# Patient Record
Sex: FEMALE | Race: WHITE | NOT HISPANIC OR LATINO | Employment: UNEMPLOYED | URBAN - NONMETROPOLITAN AREA
[De-identification: names, ages, dates, MRNs, and addresses within clinical notes are randomized per-mention and may not be internally consistent; named-entity substitution may affect disease eponyms.]

---

## 2017-01-01 PROBLEM — J34.1 MUCOUS RETENTION CYST OF MAXILLARY SINUS: Status: ACTIVE | Noted: 2017-01-01

## 2017-01-01 PROBLEM — G62.9 NEUROPATHY: Status: ACTIVE | Noted: 2017-01-01

## 2017-01-06 ENCOUNTER — OFFICE VISIT (OUTPATIENT)
Dept: OBSTETRICS AND GYNECOLOGY | Facility: CLINIC | Age: 27
End: 2017-01-06

## 2017-01-06 VITALS
HEART RATE: 78 BPM | DIASTOLIC BLOOD PRESSURE: 72 MMHG | HEIGHT: 67 IN | WEIGHT: 231.8 LBS | SYSTOLIC BLOOD PRESSURE: 114 MMHG | RESPIRATION RATE: 18 BRPM | BODY MASS INDEX: 36.38 KG/M2

## 2017-01-06 DIAGNOSIS — L68.0 FEMALE HIRSUTISM: Primary | ICD-10-CM

## 2017-01-06 DIAGNOSIS — N64.4 MASTALGIA: ICD-10-CM

## 2017-01-06 PROCEDURE — 99214 OFFICE O/P EST MOD 30 MIN: CPT | Performed by: NURSE PRACTITIONER

## 2017-01-06 RX ORDER — ERGOCALCIFEROL 1.25 MG/1
CAPSULE ORAL
Refills: 3 | COMMUNITY
Start: 2016-12-20 | End: 2018-04-25

## 2017-01-06 RX ORDER — DESOGESTREL AND ETHINYL ESTRADIOL 0.15-0.03
1 KIT ORAL DAILY
Qty: 28 TABLET | Refills: 12 | Status: SHIPPED | OUTPATIENT
Start: 2017-01-06 | End: 2017-06-30 | Stop reason: ALTCHOICE

## 2017-01-06 RX ORDER — SPIRONOLACTONE 100 MG/1
100 TABLET, FILM COATED ORAL DAILY
Qty: 30 TABLET | Refills: 5 | Status: SHIPPED | OUTPATIENT
Start: 2017-01-06 | End: 2017-08-24 | Stop reason: SDUPTHER

## 2017-01-06 RX ORDER — AMITRIPTYLINE HYDROCHLORIDE 10 MG/1
TABLET, FILM COATED ORAL
Refills: 1 | COMMUNITY
Start: 2016-12-20 | End: 2018-04-25

## 2017-01-06 RX ORDER — ETODOLAC 400 MG/1
TABLET, FILM COATED ORAL
Refills: 1 | COMMUNITY
Start: 2016-12-16 | End: 2018-04-25

## 2017-01-06 RX ORDER — TIZANIDINE 4 MG/1
TABLET ORAL
Refills: 1 | COMMUNITY
Start: 2016-12-16 | End: 2018-04-25

## 2017-01-06 RX ORDER — MULTIVIT WITH MINERALS/LUTEIN
1000 TABLET ORAL DAILY
Qty: 30 CAPSULE | Refills: 11 | Status: SHIPPED | OUTPATIENT
Start: 2017-01-06 | End: 2018-04-25

## 2017-01-06 NOTE — MR AVS SNAPSHOT
Costa Watson   1/6/2017 2:00 PM   Office Visit    Dept Phone:  [phone]   Encounter #:  [id number]    Provider:  RAHUL Boothe   Department:  [facility]                Your Full Care Plan              Today's Medication Changes          These changes are accurate as of: 1/6/17  3:09 PM.  If you have any questions, ask your nurse or doctor.               New Medication(s)Ordered:     desogestrel-ethinyl estradiol 0.15-30 MG-MCG per tablet   Commonly known as:  APRI   Take 1 tablet by mouth Daily.   Started by:  RAHUL Boothe       vitamin E 1000 UNIT capsule   Take 1 capsule by mouth Daily.   Started by:  RAHUL Boothe         Medication(s)that have changed:     spironolactone 100 MG tablet   Commonly known as:  ALDACTONE   Take 1 tablet by mouth Daily.   What changed:    - medication strength  - how much to take   Changed by:  RAHUL Boothe         Stop taking medication(s)listed here:     norgestimate-ethinyl estradiol 0.25-35 MG-MCG per tablet   Commonly known as:  SPRINTEC 28   Stopped by:  RAHUL Boothe                Where to Get Your Medications      These medications were sent to [facility] - [facility]  - [phone]  - [phone] [address] [place] [address]     Phone:  [phone]     desogestrel-ethinyl estradiol 0.15-30 MG-MCG per tablet    spironolactone 100 MG tablet    vitamin E 1000 UNIT capsule                  Your Updated Medication List          This list is accurate as of: 1/6/17  3:09 PM.  Always use your most recent med list.                amitriptyline 10 MG tablet   Commonly known as:  ELAVIL       citalopram 40 MG tablet   Commonly known as:  CELEXA   Take 1 tablet by mouth daily.       desogestrel-ethinyl estradiol 0.15-30 MG-MCG per tablet   Commonly known as:  APRI      Take 1 tablet by mouth Daily.       etodolac 400 MG tablet   Commonly known as:  LODINE       simvastatin 20 MG tablet   Commonly known as:  ZOCOR   Take 1 tablet by mouth every night.       spironolactone 100 MG tablet   Commonly known as:  ALDACTONE   Take 1 tablet by mouth Daily.       tiZANidine 4 MG tablet   Commonly known as:  ZANAFLEX       vitamin D 43517 UNITS capsule capsule   Commonly known as:  ERGOCALCIFEROL       vitamin E 1000 UNIT capsule   Take 1 capsule by mouth Daily.               Instructions     None    Patient Instructions History      Upcoming Appointments     Visit Type Date Time Department    OFFICE VISIT 1/6/2017  2:00 PM [facility]    FOLLOW UP 4/19/2017  2:30 PM [facility]      [facility] Sign[facility]     Our records indicate that you have an active [facility] [employer] account.    You can view your After Visit Summary by going to [employer] and logging in with your [employer] username and password.  If you don't have a [employer] username and password but a parent or guardian has access to your record, the parent or guardian should login with their own [employer] username and password and access your record to view the After Visit Summary.    If you have questions, you can email [email or web address] or call [phone] to talk to our [employer] staff.  Remember, [employer] is NOT to be used for urgent needs.  For medical emergencies, dial 911.               Other Info from Your Visit           Your Appointments     Apr 19, 2017  2:30 PM CDT   Follow Up with RAHUL Boothe   [facility] (--)    [address] [place] [address] Dr Ambar CHICAS [id number] [phone]           Arrive 15 minutes prior to appointment.              Allergies     No Known Allergies      Reason for Visit     Breast Problem painful lumps in both breast    excessive hair growth           Vital Signs     Blood Pressure Pulse Respirations Height Weight  "Last Menstrual Period    114/72 (BP Location: Right arm, Patient Position: Sitting, Cuff Size: Adult) 78 18 67\" (170.2 cm) 231 lb 12.8 oz (105 kg) 12/20/2016 (Approximate)    Breastfeeding? Body Mass Index Smoking Status             No 36.31 kg/m2 Current Every Day Smoker           "

## 2017-01-06 NOTE — PROGRESS NOTES
Davis Watson is a 26 y.o. female.     History of Present Illness   Pt presents with complaints of continued excessive hair growth. Pt has PCOS and hirsutism. I started pt on OCP and spironolactone 50mg 2 months ago. Pt states since then, she has noticed increasing hair around the right nipple. We discussed that this process is not 100% resolution and can take 6 months to 1 yr.     Pt also complains of bilateral generalized breast tenderness and sharp pains since starting OCP. States her breasts are very dense. Her partner states she has felt a lump in the outer quadrant of the right breast a few days ago. Pt has hx of caffeine abuse but is down from a 24pk of mountain dew to 1 bottle daily. Pt also has fibromyalgia. Paternal aunt with breast cancer.     The following portions of the patient's history were reviewed and updated as appropriate: allergies, current medications, past family history, past medical history, past social history, past surgical history and problem list.    Review of Systems   Constitutional: Negative.    Respiratory: Negative.    Cardiovascular: Negative.    Genitourinary: Positive for pelvic pain (increased cramps since OCP use). Negative for menstrual problem.   Skin:        Hirsutism, see hpi   Hematological: Negative for adenopathy.       Objective   Physical Exam   Constitutional: She is oriented to person, place, and time. She appears well-developed and well-nourished.   Cardiovascular: Normal rate, regular rhythm and normal heart sounds.    Pulmonary/Chest: Effort normal and breath sounds normal. Right breast exhibits tenderness. Right breast exhibits no inverted nipple, no mass, no nipple discharge and no skin change. Left breast exhibits tenderness. Left breast exhibits no inverted nipple, no mass, no nipple discharge and no skin change. Breast asymmetry: left breast mildly smaller than right.   Generalized very dense, mild fibrocystic changes bilaterally. Area of concern  in right outer quadrant is only noted as deep dense tissue, no palpable mass. Patch of course dark hair above right areola, fewer hairs on left areola.    Neurological: She is alert and oriented to person, place, and time.   Psychiatric: She has a normal mood and affect. Her behavior is normal.   Vitals reviewed.      Assessment/Plan   Costa was seen today for breast problem and excessive hair growth.    Diagnoses and all orders for this visit:    Female hirsutism    Mastalgia    Other orders  -     desogestrel-ethinyl estradiol (APRI) 0.15-30 MG-MCG per tablet; Take 1 tablet by mouth Daily.  -     spironolactone (ALDACTONE) 100 MG tablet; Take 1 tablet by mouth Daily.  -     vitamin E 1000 UNIT capsule; Take 1 capsule by mouth Daily.     Change progesterone and decrease estrogen content to. Increase Spironolactone to 100mg daily. Encouraged cutting out all caffeine, stop smoking, wear good fitting bra (despite pt stating it hurts more in a bra), Vit E and warm compresses with NSAID use sparingly. If this continues and is bothersome enough, stop OCP. If masses present and linger, RTC for ultrasound. RTC 6 months for hirsutism follow up.

## 2017-02-06 RX ORDER — SIMVASTATIN 20 MG
TABLET ORAL
Qty: 30 TABLET | Refills: 3 | Status: SHIPPED | OUTPATIENT
Start: 2017-02-06 | End: 2018-04-25

## 2017-03-31 PROBLEM — F41.1 GENERALIZED ANXIETY DISORDER: Status: ACTIVE | Noted: 2017-03-31

## 2017-03-31 PROBLEM — F32.A DEPRESSION: Status: ACTIVE | Noted: 2017-03-31

## 2017-05-24 ENCOUNTER — OFFICE VISIT (OUTPATIENT)
Dept: OBSTETRICS AND GYNECOLOGY | Facility: CLINIC | Age: 27
End: 2017-05-24

## 2017-05-24 VITALS
BODY MASS INDEX: 36.32 KG/M2 | HEIGHT: 67 IN | SYSTOLIC BLOOD PRESSURE: 110 MMHG | WEIGHT: 231.4 LBS | HEART RATE: 88 BPM | DIASTOLIC BLOOD PRESSURE: 78 MMHG | RESPIRATION RATE: 18 BRPM

## 2017-05-24 DIAGNOSIS — N76.0 BV (BACTERIAL VAGINOSIS): Primary | ICD-10-CM

## 2017-05-24 DIAGNOSIS — N92.1 BREAKTHROUGH BLEEDING ON BIRTH CONTROL PILLS: ICD-10-CM

## 2017-05-24 DIAGNOSIS — B96.89 BV (BACTERIAL VAGINOSIS): Primary | ICD-10-CM

## 2017-05-24 DIAGNOSIS — B37.31 CANDIDA VAGINITIS: ICD-10-CM

## 2017-05-24 PROCEDURE — 87210 SMEAR WET MOUNT SALINE/INK: CPT | Performed by: NURSE PRACTITIONER

## 2017-05-24 PROCEDURE — 99213 OFFICE O/P EST LOW 20 MIN: CPT | Performed by: NURSE PRACTITIONER

## 2017-05-24 RX ORDER — METRONIDAZOLE 500 MG/1
500 TABLET ORAL 2 TIMES DAILY
Qty: 14 TABLET | Refills: 0 | Status: SHIPPED | OUTPATIENT
Start: 2017-05-24 | End: 2017-05-31

## 2017-05-24 RX ORDER — FLUCONAZOLE 150 MG/1
150 TABLET ORAL ONCE
Qty: 3 TABLET | Refills: 0 | Status: SHIPPED | OUTPATIENT
Start: 2017-05-24 | End: 2017-05-24

## 2017-06-30 ENCOUNTER — OFFICE VISIT (OUTPATIENT)
Dept: OBSTETRICS AND GYNECOLOGY | Facility: CLINIC | Age: 27
End: 2017-06-30

## 2017-06-30 VITALS
DIASTOLIC BLOOD PRESSURE: 76 MMHG | HEART RATE: 80 BPM | BODY MASS INDEX: 36.32 KG/M2 | RESPIRATION RATE: 16 BRPM | SYSTOLIC BLOOD PRESSURE: 112 MMHG | WEIGHT: 231.4 LBS | HEIGHT: 67 IN

## 2017-06-30 DIAGNOSIS — Z30.017 NEXPLANON INSERTION: Primary | ICD-10-CM

## 2017-06-30 PROCEDURE — 11981 INSERTION DRUG DLVR IMPLANT: CPT | Performed by: NURSE PRACTITIONER

## 2017-06-30 RX ORDER — CELECOXIB 200 MG/1
CAPSULE ORAL
Refills: 1 | COMMUNITY
Start: 2017-06-13 | End: 2018-04-25

## 2017-06-30 NOTE — PROGRESS NOTES
Nexplanon Insertion    Patient's last menstrual period was 06/29/2017 (exact date).    Date of procedure:  6/30/2017    Risks and benefits discussed? yes  All questions answered? yes  Consents given by the patient  Written consent obtained? yes    Local anesthesia used:  yes - 2 cc's of  Meds; anesthesia local: 2% lidocaine    Procedure documentation:    The upper left arm (non-dominant) was marked at the intended site of insertion.  Betadine was used to cleanse the skin.  Local anesthesia was injected.  The Nexplanon was placed subdermally without difficulty.  The device was able to be palpated in the arm by both myself and Costa.  Steri-strips were then placed across the site of insertion and the arm was wrapped.    She tolerated the procedure well.  There were no complications.  EBL was minimal.    Post procedure instructions: Remove the wrapping in 24 hours and the steri-strips in 5 days.    Follow up needed: PRN    Diagnosis:  Nexplanon Insertion    This note was electronically signed.    Melissa Sousa, APRN  6/30/2017

## 2017-08-24 RX ORDER — SPIRONOLACTONE 100 MG/1
100 TABLET, FILM COATED ORAL DAILY
Qty: 30 TABLET | Refills: 5 | Status: SHIPPED | OUTPATIENT
Start: 2017-08-24 | End: 2018-04-25

## 2017-08-24 RX ORDER — SPIRONOLACTONE 100 MG/1
TABLET, FILM COATED ORAL
Qty: 30 TABLET | Refills: 3 | OUTPATIENT
Start: 2017-08-24

## 2017-10-23 ENCOUNTER — OFFICE VISIT (OUTPATIENT)
Dept: OBSTETRICS AND GYNECOLOGY | Facility: CLINIC | Age: 27
End: 2017-10-23

## 2017-10-23 VITALS
HEIGHT: 67 IN | WEIGHT: 226.6 LBS | SYSTOLIC BLOOD PRESSURE: 114 MMHG | BODY MASS INDEX: 35.56 KG/M2 | DIASTOLIC BLOOD PRESSURE: 86 MMHG

## 2017-10-23 DIAGNOSIS — Z87.42 HISTORY OF PCOS: ICD-10-CM

## 2017-10-23 DIAGNOSIS — N92.6 IRREGULAR MENSES: ICD-10-CM

## 2017-10-23 DIAGNOSIS — Z97.5 NEXPLANON IN PLACE: Primary | ICD-10-CM

## 2017-10-23 PROCEDURE — 99406 BEHAV CHNG SMOKING 3-10 MIN: CPT | Performed by: NURSE PRACTITIONER

## 2017-10-23 PROCEDURE — 99203 OFFICE O/P NEW LOW 30 MIN: CPT | Performed by: NURSE PRACTITIONER

## 2017-10-23 RX ORDER — HYDROCORTISONE ACETATE 25 MG/1
SUPPOSITORY RECTAL
Refills: 1 | COMMUNITY
Start: 2017-09-22 | End: 2018-04-25

## 2017-10-23 RX ORDER — NITROFURANTOIN 25; 75 MG/1; MG/1
CAPSULE ORAL
Refills: 0 | COMMUNITY
Start: 2017-09-21 | End: 2018-04-25

## 2017-10-23 RX ORDER — NICOTINE 21 MG/24HR
PATCH, TRANSDERMAL 24 HOURS TRANSDERMAL
Refills: 0 | COMMUNITY
Start: 2017-09-27 | End: 2018-04-25

## 2017-10-23 RX ORDER — ONDANSETRON 4 MG/1
TABLET, ORALLY DISINTEGRATING ORAL
Refills: 1 | COMMUNITY
Start: 2017-09-22 | End: 2018-04-25

## 2017-10-23 RX ORDER — LOPERAMIDE HYDROCHLORIDE 2 MG/1
CAPSULE ORAL
Refills: 1 | COMMUNITY
Start: 2017-09-21 | End: 2018-04-25

## 2017-10-23 RX ORDER — MULTIVIT-MIN/FOLIC/VIT K/LYCOP 400-20-370
TABLET ORAL
Refills: 0 | COMMUNITY
Start: 2017-09-27 | End: 2018-04-25

## 2017-10-23 RX ORDER — METRONIDAZOLE 500 MG/1
TABLET ORAL
Refills: 0 | COMMUNITY
Start: 2017-09-22 | End: 2018-04-25

## 2017-10-23 NOTE — PROGRESS NOTES
"Subjective     Chief Complaint   Patient presents with   • Menstrual Problem       Costa Watson is a 27 y.o.  whose LMP is Patient's last menstrual period was 10/13/2017 (approximate).. She is new to the practice. She presents with complaints of abnormal bleeding with the Nexplanon. She got the Nexplanon placed on 17. LNMP 10/1/17 which last for approx 2 weeks. Reports she bled almost the entire month of September.  Reports her menses have been very irregular with the Nexplanon. She got the Nexplanon for menses control and she states her cycles are very irregular since getting the Nexplanon.  She is concerned because the Nexplanon is not regulating her cycles. State she was told by her previous OB/GYN that she has PCOS. She is questioning the diagnosis because she never had a pelvic US, she just completed labs. Recently had labs collected in early 10/17.  She was prescribed spironolactone to help with hair growth but is not taking. Her last pap was approx 2 year ago. She also complains of very painful menses. Has never had a pelvic US. She recently moved her from [facility] to take care of her mother.      HPI    HPI    The following portions of the patient's history were reviewed and updated as appropriate:vital signs, allergies, current medications, past medical history, past social history, past surgical history and problem list      Review of Systems     Review of Systems   Constitutional: Negative.    Respiratory: Negative.    Cardiovascular: Negative.    Gastrointestinal: Negative.    Endocrine: Negative.    Genitourinary: Positive for menstrual problem (irregular menses ) and pelvic pain (with cycles ).   Musculoskeletal: Negative.    Skin: Negative.    Neurological: Negative.    Psychiatric/Behavioral: Negative.        Objective      /86  Ht 67\" (170.2 cm)  Wt 226 lb 9.6 oz (103 kg)  LMP 10/13/2017 (Approximate)  Breastfeeding? No  BMI 35.49 kg/m2    Physical Exam    Physical " Exam   Constitutional: She is oriented to person, place, and time. She appears well-nourished.   Pulmonary/Chest: Effort normal.   Abdominal: Soft.   Musculoskeletal: Normal range of motion.   Neurological: She is alert and oriented to person, place, and time.   Skin: Skin is warm and dry.   Psychiatric: She has a normal mood and affect. Her behavior is normal.   Vitals reviewed.      Lab Review   Labs: PCOS panel form previous OB. Elevated Testosterone, otherwise normal labs. Insulin is near the high end of normal. No urine sample today d/t pt is unable to void.      Imaging   No data reviewed    Assessment  There are no diagnoses linked to this encounter.    Additional Assessment:   1) Irregular menses with Nexplanon  2) PCOS     Plan       1. Irregular menses- Reassured that menses will be abnormal with Nexplanon. Pt desires to keep Nexplanon at this time.     2. PCOS- Labs rev'd. Plan to check TVUS.     3. Scheduled for: STD Labs - N/A , Ultrasound of the -  PELVIC , Mammography - N/A , Bone Density Test - NO , Additional Labs - None     4. Pap:  approx 2 years     5. Contraception: Nexplanon     6. STD:  Enc condom use.     7. Smoking status: smoker. Enc smoking cessation. Pt states she is under a lot of stress.     8. Annual Exam scheduled for: Needs to schedule     I advised the patient of the risks in continuing to use tobacco, and I provided this patient with smoking cessation educational materials.  I also discussed how to quit smoking and the patient has expressed the willingness to quit.      During this visit, I spent 3-10 mintues counseling the patient regarding smoking cessation.         RTO for AE and TVUS for eval of PCOS.     Payton Mueller, APRN  10/23/2017

## 2018-04-25 ENCOUNTER — PROCEDURE VISIT (OUTPATIENT)
Dept: OBSTETRICS AND GYNECOLOGY | Facility: CLINIC | Age: 28
End: 2018-04-25

## 2018-04-25 VITALS
SYSTOLIC BLOOD PRESSURE: 124 MMHG | BODY MASS INDEX: 37.98 KG/M2 | HEIGHT: 67 IN | DIASTOLIC BLOOD PRESSURE: 80 MMHG | WEIGHT: 242 LBS

## 2018-04-25 DIAGNOSIS — Z30.46 NEXPLANON REMOVAL: Primary | ICD-10-CM

## 2018-04-25 PROCEDURE — 11982 REMOVE DRUG IMPLANT DEVICE: CPT | Performed by: NURSE PRACTITIONER

## 2018-04-25 RX ORDER — DICLOFENAC SODIUM 75 MG/1
TABLET, DELAYED RELEASE ORAL
Refills: 0 | COMMUNITY
Start: 2018-04-06 | End: 2018-11-17

## 2018-04-25 NOTE — PROGRESS NOTES
Procedure: Nexplanon removal    Pre Dx: 1) Nexplanon removal, seeking pregnancy   Post Dx: 1) Nexplanon removal     The risks, benefits, and alternatives to remove the device have been discussed with the patient at length. All of her questions are answered. She is aware of the need for alternative means of contraception if desired. Verbal informed consent is obtained.    Able to easily palpate the device in the nondominant Left arm. Additional imaging was not required. The device feels freely mobile and easily accessible. She was placed in the examining table in a supine position with her arm elevated at her side. The skin over this site is prepped with Betadine. 2-3 mL of 1% lidocaine with epinephrine was injected.  Downward pressure was applied on the end of the implant nearest the axilla, and a 2-3 mm incision was made in a longitudinal direction of the arm at the tip of the implant closest to the elbow. The implant was then pushed gently toward the incision until the tip was visible. The fibrous capsule was opened with blunt dissection using a hemostat. The implant was grasp with a hemostat and was easily removed intact. It measured a  full 4 cm in length.    Tolerated well  No apparent complications    The skin was cleansed and dried. A Steri-Strip was applied. The arm was gently wrapped with Kerlex gauze. The patient had normal strength and sensation in her left extremity. There was no significant bleeding. There were no complications. The patient was advised about proper care of the dressings. She was advised to call or return for complications such as fever, signs of infection, increased pain, or any other concerns.    Warning signs, limitations and expectations reviewed. ERX sent for prenatal vitamins.   Pt is a vapor, enc smoking cessation.     Payton Mueller, RAHUL  4/25/2018  11:34 AM

## 2019-01-10 ENCOUNTER — OFFICE VISIT (OUTPATIENT)
Dept: OBSTETRICS AND GYNECOLOGY | Facility: CLINIC | Age: 29
End: 2019-01-10

## 2019-01-10 VITALS
HEIGHT: 67 IN | DIASTOLIC BLOOD PRESSURE: 80 MMHG | WEIGHT: 227 LBS | SYSTOLIC BLOOD PRESSURE: 110 MMHG | BODY MASS INDEX: 35.63 KG/M2

## 2019-01-10 DIAGNOSIS — N64.4 MASTODYNIA: ICD-10-CM

## 2019-01-10 DIAGNOSIS — F17.200 TOBACCO DEPENDENCE: ICD-10-CM

## 2019-01-10 DIAGNOSIS — Z87.42 HISTORY OF PCOS: ICD-10-CM

## 2019-01-10 DIAGNOSIS — Z20.2 POSSIBLE EXPOSURE TO STD: ICD-10-CM

## 2019-01-10 DIAGNOSIS — F32.89 OTHER DEPRESSION: ICD-10-CM

## 2019-01-10 DIAGNOSIS — Z01.419 WELL WOMAN EXAM WITH ROUTINE GYNECOLOGICAL EXAM: Primary | ICD-10-CM

## 2019-01-10 PROCEDURE — 99406 BEHAV CHNG SMOKING 3-10 MIN: CPT | Performed by: OBSTETRICS & GYNECOLOGY

## 2019-01-10 PROCEDURE — 99385 PREV VISIT NEW AGE 18-39: CPT | Performed by: OBSTETRICS & GYNECOLOGY

## 2019-01-10 PROCEDURE — 99213 OFFICE O/P EST LOW 20 MIN: CPT | Performed by: OBSTETRICS & GYNECOLOGY

## 2019-01-10 NOTE — PROGRESS NOTES
GYN Annual Exam     CC- Here for annual exam.     Costa Watson is a 28 y.o. female established pt of practice who is new to me who presents for annual well woman exam. Periods are irregular, lasting 4 days. She tends to skip months. She was diagnosed with PCOS at age 26 but has had so many health problems that she has not kept up with her PCOS. She has a H/o histoplasmosis and says she had an MI in September related to this diagnosis. She has not had any cardiology followup recently and says she plans on moving soon so does not want a referral.  She also has a h/o sepsis related to C diff. She had a Nexplanon that was removed this spring.     OB History      Para Term  AB Living    0 0 0 0 0 0    SAB TAB Ectopic Molar Multiple Live Births    0 0 0   0          Obstetric Comments    No plans          Menarche: 13  Current contraception: none  History of abnormal Pap smear: no  History of abnormal mammogram: no  Family history of uterine, colon or ovarian cancer: no  Family history of breast cancer: yes - p aunt ? age at dx  H/o STDs: none  Gardasil: 2/3  H/O PCOS    Health Maintenance   Topic Date Due   • ANNUAL PHYSICAL  1995   • HPV VACCINES (1 - Female 3-dose series) 2003   • HEPATITIS A VACCINE ADULT (1 of 2) 2010   • PNEUMOCOCCAL VACCINE (19-64 MEDIUM RISK) (1 of 1 - PPSV23) 2011   • TDAP/TD VACCINES (1 - Tdap) 2011   • PAP SMEAR  2016   • INFLUENZA VACCINE  2018       Past Medical History:   Diagnosis Date   • Acid reflux    • Anxiety    • C. difficile colitis    • Colitis    • Cyst, sinus nasal    • Depression    • Fibromyalgia    • Histoplasmosis    • Myocardial infarction (CMS/HCC) 2018   • PCOS (polycystic ovarian syndrome)    • Vaginitis        Past Surgical History:   Procedure Laterality Date   • CARDIAC CATHETERIZATION     • MEDIASTINOSCOPY           Current Outpatient Medications:   •  itraconazole (SPORANOX) 100 MG capsule, Take 200 mg by  "mouth 2 (Two) Times a Day., Disp: , Rfl: 2  •  sertraline (ZOLOFT) 100 MG tablet, Take 100 mg by mouth Daily., Disp: , Rfl:   •  vitamin E (vitamin E) 400 UNIT capsule, Take 1 capsule by mouth Daily., Disp: 30 capsule, Rfl: 3    Allergies   Allergen Reactions   • Latex Hives       Social History     Tobacco Use   • Smoking status: Current Every Day Smoker     Packs/day: 0.50     Years: 9.00     Pack years: 4.50     Types: Cigarettes   • Smokeless tobacco: Never Used   Substance Use Topics   • Alcohol use: No   • Drug use: No       Family History   Problem Relation Age of Onset   • Osteoporosis Maternal Grandmother    • Diabetes Father    • Arthritis Father    • Alzheimer's disease Mother    • Breast cancer Paternal Aunt    • Ovarian cancer Neg Hx    • Colon cancer Neg Hx        Review of Systems   Constitutional: Negative for appetite change, fatigue, fever and unexpected weight change.   Eyes: Negative for photophobia and visual disturbance.   Respiratory: Negative for cough, chest tightness and shortness of breath.    Cardiovascular: Negative for chest pain and palpitations.   Gastrointestinal: Negative for abdominal distention, abdominal pain, constipation, diarrhea and nausea.   Endocrine: Negative for cold intolerance and heat intolerance.   Genitourinary: Negative for dyspareunia, dysuria, pelvic pain and vaginal discharge.   Musculoskeletal: Positive for myalgias (Breast tenderness).   Skin: Negative for color change and rash.        + abnormal hair growth   Allergic/Immunologic: Negative for environmental allergies and food allergies.   Neurological: Negative for headaches.   Hematological: Negative for adenopathy. Does not bruise/bleed easily.   Psychiatric/Behavioral: Negative for dysphoric mood. The patient is not nervous/anxious.    All other systems reviewed and are negative.      /80   Ht 170.2 cm (67\")   Wt 103 kg (227 lb)   LMP 12/20/2018 (Exact Date)   Breastfeeding? No   BMI 35.55 kg/m² "     Physical Exam   Constitutional: She is oriented to person, place, and time. She appears well-developed and well-nourished.   HENT:   Head: Normocephalic and atraumatic.   Eyes: Conjunctivae are normal. No scleral icterus.   Neck: Neck supple. No thyromegaly present.   Cardiovascular: Normal rate, regular rhythm and normal heart sounds.   Pulmonary/Chest: Effort normal and breath sounds normal. Right breast exhibits tenderness. Right breast exhibits no inverted nipple, no mass, no nipple discharge and no skin change. Left breast exhibits tenderness. Left breast exhibits no inverted nipple, no mass, no nipple discharge and no skin change.   Fibrocystic breast changes, no dominant masses   Abdominal: Soft. Bowel sounds are normal. She exhibits no distension and no mass. There is no tenderness. There is no rebound and no guarding. No hernia.   Genitourinary: Uterus normal. Pelvic exam was performed with patient supine. There is no rash, tenderness or lesion on the right labia. There is no rash, tenderness or lesion on the left labia. Cervix exhibits no motion tenderness, no discharge and no friability. Right adnexum displays no mass, no tenderness and no fullness. Left adnexum displays no mass, no tenderness and no fullness. No erythema, tenderness or bleeding in the vagina. No foreign body in the vagina. No signs of injury around the vagina. No vaginal discharge found.       Musculoskeletal: Normal range of motion.   Neurological: She is alert and oriented to person, place, and time.   Skin: Skin is warm and dry.   Psychiatric: She has a normal mood and affect. Her behavior is normal. Judgment and thought content normal.   Nursing note and vitals reviewed.         Assessment/Plan    1) GYN HM: check pap/C/G/T   SBE demonstrated and encouraged.  2) STD screening: accepts2 Condoms encouraged.  3) Contraception: none at present. Pt needs to avoid E2 containing methods due to her h/o MI. Enc her to consider an IUD.  Discussed with patient at length risk, benefits and alternatives to all contraceptive options, including oral contraceptive pills (both combination and progesterone only), vaginal rings, patches, Dep Provera, condoms, diaphragm, cervical caps, as well as long active but reversible forms such as Nexplanon and all IUD’s.  Differences in birth control and cycle control between methods were outlined along with correct usage for each method.  After discussion, the patient is not ready to make a decision yet.   4) Family Planning: no plans at present, encourage folic acid daily  5) Diet and Exercise discussed  6) Smoking Status: I advised Costa of the risks of continuing to use tobacco, and I provided her with tobacco cessation educational materials . During this visit, I spent 4 minutes counseling the patient regarding tobacco cessation.  7) Depression- refill Zoloft 100 mg QD  8) H/O PCOS- schedule TVUS, fasting insulin/glucose,free testosterone and HgBA1c. F/u 2 week afterwards to discuss results and discuss treatment.   9) Mastodynia- enc Vit E 400 units daily and decrease caffeiene by 75%.  Follow up prn or 1 year       Costa was seen today for gynecologic exam.    Diagnoses and all orders for this visit:    Well woman exam with routine gynecological exam  -     POC Urinalysis Dipstick  -     POC Pregnancy, Urine  -     Pap IG, Ct-Ng TV Rfx HPV ASCU  -     Hepatitis B Surface Antigen  -     Hepatitis C Antibody  -     HIV-1 / O / 2 Ag / Antibody 4th Generation  -     HSV 1 & 2 - Specific Antibody, IgG  -     RPR    Possible exposure to STD  -     Hepatitis B Surface Antigen  -     Hepatitis C Antibody  -     HIV-1 / O / 2 Ag / Antibody 4th Generation  -     HSV 1 & 2 - Specific Antibody, IgG  -     RPR    History of PCOS    Other depression    Tobacco dependence    Mastodynia    Other orders  -     vitamin E (vitamin E) 400 UNIT capsule; Take 1 capsule by mouth Daily.          Kristin Powell,  MD  1/10/19  11:40 AM

## 2019-01-11 LAB
HBV SURFACE AG SERPL QL IA: NEGATIVE
HCV AB S/CO SERPL IA: 0.2 S/CO RATIO (ref 0–0.9)
HIV 1+2 AB+HIV1 P24 AG SERPL QL IA: NON REACTIVE
HSV1 IGG SER IA-ACNC: <0.91 INDEX (ref 0–0.9)
HSV2 IGG SER IA-ACNC: <0.91 INDEX (ref 0–0.9)
RPR SER QL: NORMAL

## 2019-01-13 PROBLEM — Z97.5 NEXPLANON IN PLACE: Status: RESOLVED | Noted: 2017-10-23 | Resolved: 2019-01-13

## 2019-01-13 PROBLEM — Z30.46 NEXPLANON REMOVAL: Status: RESOLVED | Noted: 2018-04-25 | Resolved: 2019-01-13

## 2019-01-13 RX ORDER — VITAMIN E 268 MG
400 CAPSULE ORAL DAILY
Qty: 30 CAPSULE | Refills: 3 | Status: SHIPPED | OUTPATIENT
Start: 2019-01-13 | End: 2019-07-16

## 2019-01-14 LAB
C TRACH RRNA CVX QL NAA+PROBE: NEGATIVE
CONV .: NORMAL
CYTOLOGIST CVX/VAG CYTO: NORMAL
CYTOLOGY CVX/VAG DOC THIN PREP: NORMAL
DX ICD CODE: NORMAL
HIV 1 & 2 AB SER-IMP: NORMAL
N GONORRHOEA RRNA CVX QL NAA+PROBE: NEGATIVE
OTHER STN SPEC: NORMAL
PATH REPORT.FINAL DX SPEC: NORMAL
STAT OF ADQ CVX/VAG CYTO-IMP: NORMAL
T VAGINALIS RRNA SPEC QL NAA+PROBE: NEGATIVE

## 2019-01-15 ENCOUNTER — TELEPHONE (OUTPATIENT)
Dept: OBSTETRICS AND GYNECOLOGY | Facility: CLINIC | Age: 29
End: 2019-01-15

## 2019-01-15 RX ORDER — SERTRALINE HYDROCHLORIDE 100 MG/1
100 TABLET, FILM COATED ORAL DAILY
Qty: 30 TABLET | Refills: 11 | Status: SHIPPED | OUTPATIENT
Start: 2019-01-15 | End: 2019-07-16

## 2019-01-15 RX ORDER — SERTRALINE HYDROCHLORIDE 100 MG/1
100 TABLET, FILM COATED ORAL DAILY
Qty: 30 TABLET | Refills: 11 | Status: SHIPPED | OUTPATIENT
Start: 2019-01-15

## 2019-01-15 RX ORDER — METRONIDAZOLE 500 MG/1
500 TABLET ORAL 2 TIMES DAILY
Qty: 14 TABLET | Refills: 0 | Status: SHIPPED | OUTPATIENT
Start: 2019-01-15 | End: 2019-01-22

## 2019-06-05 ENCOUNTER — HOSPITAL ENCOUNTER (EMERGENCY)
Facility: HOSPITAL | Age: 29
Discharge: HOME | End: 2019-06-05
Attending: EMERGENCY MEDICINE | Admitting: EMERGENCY MEDICINE

## 2019-06-05 ENCOUNTER — APPOINTMENT (OUTPATIENT)
Dept: GENERAL RADIOLOGY | Facility: HOSPITAL | Age: 29
End: 2019-06-05

## 2019-06-05 VITALS
WEIGHT: 225 LBS | TEMPERATURE: 98 F | SYSTOLIC BLOOD PRESSURE: 113 MMHG | HEART RATE: 73 BPM | BODY MASS INDEX: 33.33 KG/M2 | HEIGHT: 69 IN | OXYGEN SATURATION: 98 % | RESPIRATION RATE: 14 BRPM | DIASTOLIC BLOOD PRESSURE: 83 MMHG

## 2019-06-05 DIAGNOSIS — F17.200 SMOKING: ICD-10-CM

## 2019-06-05 DIAGNOSIS — R07.89 ATYPICAL CHEST PAIN: Primary | ICD-10-CM

## 2019-06-05 LAB
ALBUMIN SERPL-MCNC: 3.5 G/DL (ref 3.5–5.2)
ALBUMIN/GLOB SERPL: 1.5 G/DL
ALP SERPL-CCNC: 70 U/L (ref 39–117)
ALT SERPL W P-5'-P-CCNC: 16 U/L (ref 1–33)
ANION GAP SERPL CALCULATED.3IONS-SCNC: 13.1 MMOL/L
AST SERPL-CCNC: 13 U/L (ref 1–32)
BASOPHILS # BLD AUTO: 0.05 10*3/MM3 (ref 0–0.2)
BASOPHILS NFR BLD AUTO: 0.7 % (ref 0–1.5)
BILIRUB SERPL-MCNC: 0.2 MG/DL (ref 0.2–1.2)
BUN BLD-MCNC: 12 MG/DL (ref 6–20)
BUN/CREAT SERPL: 14 (ref 7–25)
CALCIUM SPEC-SCNC: 8.7 MG/DL (ref 8.6–10.5)
CHLORIDE SERPL-SCNC: 105 MMOL/L (ref 98–107)
CO2 SERPL-SCNC: 17.9 MMOL/L (ref 22–29)
CREAT BLD-MCNC: 0.86 MG/DL (ref 0.57–1)
DEPRECATED RDW RBC AUTO: 41.5 FL (ref 37–54)
EOSINOPHIL # BLD AUTO: 0.37 10*3/MM3 (ref 0–0.4)
EOSINOPHIL NFR BLD AUTO: 5 % (ref 0.3–6.2)
ERYTHROCYTE [DISTWIDTH] IN BLOOD BY AUTOMATED COUNT: 12.3 % (ref 12.3–15.4)
GFR SERPL CREATININE-BSD FRML MDRD: 80 ML/MIN/1.73
GLOBULIN UR ELPH-MCNC: 2.4 GM/DL
GLUCOSE BLD-MCNC: 101 MG/DL (ref 65–99)
HCT VFR BLD AUTO: 43.7 % (ref 34–46.6)
HGB BLD-MCNC: 14.8 G/DL (ref 12–15.9)
IMM GRANULOCYTES # BLD AUTO: 0.02 10*3/MM3 (ref 0–0.05)
IMM GRANULOCYTES NFR BLD AUTO: 0.3 % (ref 0–0.5)
LYMPHOCYTES # BLD AUTO: 2.05 10*3/MM3 (ref 0.7–3.1)
LYMPHOCYTES NFR BLD AUTO: 27.9 % (ref 19.6–45.3)
MCH RBC QN AUTO: 31 PG (ref 26.6–33)
MCHC RBC AUTO-ENTMCNC: 33.9 G/DL (ref 31.5–35.7)
MCV RBC AUTO: 91.6 FL (ref 79–97)
MONOCYTES # BLD AUTO: 0.51 10*3/MM3 (ref 0.1–0.9)
MONOCYTES NFR BLD AUTO: 6.9 % (ref 5–12)
NEUTROPHILS # BLD AUTO: 4.34 10*3/MM3 (ref 1.7–7)
NEUTROPHILS NFR BLD AUTO: 59.2 % (ref 42.7–76)
NRBC BLD AUTO-RTO: 0 /100 WBC (ref 0–0.2)
PLATELET # BLD AUTO: 192 10*3/MM3 (ref 140–450)
PMV BLD AUTO: 11.4 FL (ref 6–12)
POTASSIUM BLD-SCNC: 4.1 MMOL/L (ref 3.5–5.2)
PROT SERPL-MCNC: 5.9 G/DL (ref 6–8.5)
RBC # BLD AUTO: 4.77 10*6/MM3 (ref 3.77–5.28)
SODIUM BLD-SCNC: 136 MMOL/L (ref 136–145)
TROPONIN T SERPL-MCNC: <0.01 NG/ML (ref 0–0.03)
WBC NRBC COR # BLD: 7.34 10*3/MM3 (ref 3.4–10.8)

## 2019-06-05 PROCEDURE — 93005 ELECTROCARDIOGRAM TRACING: CPT | Performed by: EMERGENCY MEDICINE

## 2019-06-05 PROCEDURE — 99284 EMERGENCY DEPT VISIT MOD MDM: CPT | Performed by: EMERGENCY MEDICINE

## 2019-06-05 PROCEDURE — 85025 COMPLETE CBC W/AUTO DIFF WBC: CPT | Performed by: EMERGENCY MEDICINE

## 2019-06-05 PROCEDURE — 84484 ASSAY OF TROPONIN QUANT: CPT | Performed by: EMERGENCY MEDICINE

## 2019-06-05 PROCEDURE — 71045 X-RAY EXAM CHEST 1 VIEW: CPT

## 2019-06-05 PROCEDURE — 93010 ELECTROCARDIOGRAM REPORT: CPT | Performed by: INTERNAL MEDICINE

## 2019-06-05 PROCEDURE — 80053 COMPREHEN METABOLIC PANEL: CPT | Performed by: EMERGENCY MEDICINE

## 2019-06-05 PROCEDURE — 99284 EMERGENCY DEPT VISIT MOD MDM: CPT

## 2019-06-05 RX ORDER — ASPIRIN 325 MG
325 TABLET ORAL ONCE
Status: COMPLETED | OUTPATIENT
Start: 2019-06-05 | End: 2019-06-05

## 2019-06-05 RX ADMIN — ASPIRIN 325 MG: 325 TABLET, COATED ORAL at 08:46

## 2019-06-05 NOTE — ED PROVIDER NOTES
Subjective     Chest Pain   Pain location:  L chest and R chest  Pain quality: dull    Pain radiates to:  Does not radiate  Pain severity:  Moderate  Onset quality:  Sudden  Timing:  Constant  Progression:  Waxing and waning  Chronicity:  New  Context comment:  Spont onset this am  Worsened by:  Nothing  Ineffective treatments:  None tried  Associated symptoms: shortness of breath    Associated symptoms: no abdominal pain, no cough, no diaphoresis and no fever        Review of Systems   Constitutional: Negative for diaphoresis and fever.   Respiratory: Positive for shortness of breath. Negative for cough.    Cardiovascular: Positive for chest pain.   Gastrointestinal: Negative for abdominal pain.   All other systems reviewed and are negative.      Past Medical History:   Diagnosis Date   • Acid reflux    • Anxiety    • C. difficile colitis    • Colitis    • Cyst, sinus nasal    • Depression    • Fibromyalgia    • Fibromyalgia, primary    • Histoplasmosis    • Hx of gastroesophageal reflux (GERD)    • Myocardial infarction (CMS/HCC) 09/09/2018   • Ovarian cyst    • PCOS (polycystic ovarian syndrome)    • Vaginitis        Allergies   Allergen Reactions   • Latex Hives       Past Surgical History:   Procedure Laterality Date   • CARDIAC CATHETERIZATION     • MEDIASTINOSCOPY         Family History   Problem Relation Age of Onset   • Osteoporosis Maternal Grandmother    • Diabetes Father    • Arthritis Father    • Alzheimer's disease Mother    • Breast cancer Paternal Aunt    • Prostate cancer Paternal Uncle    • Ovarian cancer Neg Hx    • Colon cancer Neg Hx        Social History     Socioeconomic History   • Marital status: Single     Spouse name: Not on file   • Number of children: Not on file   • Years of education: Not on file   • Highest education level: Not on file   Tobacco Use   • Smoking status: Current Every Day Smoker     Packs/day: 0.50     Years: 10.00     Pack years: 5.00     Types: Cigarettes   •  Smokeless tobacco: Never Used   Substance and Sexual Activity   • Alcohol use: No   • Drug use: No   • Sexual activity: Yes     Partners: Female, Male     Birth control/protection: None           Objective   Physical Exam   Constitutional: She is oriented to person, place, and time. She appears well-developed and well-nourished.  Non-toxic appearance. She does not appear ill. No distress.   HENT:   Head: Normocephalic and atraumatic.   Eyes: EOM are normal. Pupils are equal, round, and reactive to light.   Neck: Normal range of motion. Neck supple.   Cardiovascular: Normal rate, regular rhythm, intact distal pulses and normal pulses. Exam reveals no gallop.   No murmur heard.  Pulmonary/Chest: Effort normal and breath sounds normal. No accessory muscle usage. No tachypnea. No respiratory distress.   Abdominal: Soft. Bowel sounds are normal. She exhibits no distension and no mass. There is no tenderness. There is no guarding.   Musculoskeletal:        Right lower leg: She exhibits no edema.        Left lower leg: She exhibits no edema.   Lymphadenopathy:     She has no cervical adenopathy.   Neurological: She is alert and oriented to person, place, and time.   Skin: Skin is warm and dry. Capillary refill takes less than 2 seconds. No rash noted. She is not diaphoretic.   Psychiatric: She has a normal mood and affect. Her behavior is normal.   Nursing note and vitals reviewed.      Procedures           ED Course  ED Course as of Jun 05 0947 Wed Jun 05, 2019   0831 Ekg by me nsr, qrs nml, no st elev  [BC]      ED Course User Index  [BC] David Hatch MD                  MDM  Reeval, appears well, vss, smiling and pleasant, asking for work note, ok with plan to f/u pmd, return for worse    Final diagnoses:   Atypical chest pain   Smoking            David Hatch MD  06/05/19 0924

## 2019-07-16 ENCOUNTER — HOSPITAL ENCOUNTER (EMERGENCY)
Facility: HOSPITAL | Age: 29
Discharge: HOME | End: 2019-07-16
Attending: EMERGENCY MEDICINE | Admitting: EMERGENCY MEDICINE

## 2019-07-16 ENCOUNTER — APPOINTMENT (OUTPATIENT)
Dept: CT IMAGING | Facility: HOSPITAL | Age: 29
End: 2019-07-16

## 2019-07-16 VITALS
HEART RATE: 83 BPM | DIASTOLIC BLOOD PRESSURE: 53 MMHG | WEIGHT: 231.38 LBS | HEIGHT: 69 IN | OXYGEN SATURATION: 97 % | TEMPERATURE: 98.6 F | BODY MASS INDEX: 34.27 KG/M2 | SYSTOLIC BLOOD PRESSURE: 114 MMHG | RESPIRATION RATE: 15 BRPM

## 2019-07-16 DIAGNOSIS — G43.809 MIGRAINE VARIANT: Primary | ICD-10-CM

## 2019-07-16 DIAGNOSIS — R44.8 PARESTHESIA OF TONGUE: ICD-10-CM

## 2019-07-16 LAB
ANION GAP SERPL CALCULATED.3IONS-SCNC: 11.2 MMOL/L (ref 5–15)
BACTERIA UR QL AUTO: ABNORMAL /HPF
BASOPHILS # BLD AUTO: 0.06 10*3/MM3 (ref 0–0.2)
BASOPHILS NFR BLD AUTO: 0.8 % (ref 0–1.5)
BILIRUB UR QL STRIP: NEGATIVE
BUN BLD-MCNC: 7 MG/DL (ref 6–20)
BUN/CREAT SERPL: 8 (ref 7–25)
CALCIUM SPEC-SCNC: 9 MG/DL (ref 8.6–10.5)
CHLORIDE SERPL-SCNC: 106 MMOL/L (ref 98–107)
CLARITY UR: CLEAR
CO2 SERPL-SCNC: 22.8 MMOL/L (ref 22–29)
COLOR UR: YELLOW
CREAT BLD-MCNC: 0.87 MG/DL (ref 0.57–1)
DEPRECATED RDW RBC AUTO: 41.7 FL (ref 37–54)
EOSINOPHIL # BLD AUTO: 0.39 10*3/MM3 (ref 0–0.4)
EOSINOPHIL NFR BLD AUTO: 4.9 % (ref 0.3–6.2)
ERYTHROCYTE [DISTWIDTH] IN BLOOD BY AUTOMATED COUNT: 12.1 % (ref 12.3–15.4)
GFR SERPL CREATININE-BSD FRML MDRD: 79 ML/MIN/1.73
GLUCOSE BLD-MCNC: 130 MG/DL (ref 65–99)
GLUCOSE UR STRIP-MCNC: NEGATIVE MG/DL
HCG SERPL QL: NEGATIVE
HCT VFR BLD AUTO: 44.9 % (ref 34–46.6)
HGB BLD-MCNC: 14.9 G/DL (ref 12–15.9)
HGB UR QL STRIP.AUTO: ABNORMAL
HYALINE CASTS UR QL AUTO: ABNORMAL /LPF
IMM GRANULOCYTES # BLD AUTO: 0.01 10*3/MM3 (ref 0–0.05)
IMM GRANULOCYTES NFR BLD AUTO: 0.1 % (ref 0–0.5)
KETONES UR QL STRIP: NEGATIVE
LEUKOCYTE ESTERASE UR QL STRIP.AUTO: NEGATIVE
LYMPHOCYTES # BLD AUTO: 2.84 10*3/MM3 (ref 0.7–3.1)
LYMPHOCYTES NFR BLD AUTO: 35.6 % (ref 19.6–45.3)
MCH RBC QN AUTO: 30.3 PG (ref 26.6–33)
MCHC RBC AUTO-ENTMCNC: 33.2 G/DL (ref 31.5–35.7)
MCV RBC AUTO: 91.3 FL (ref 79–97)
MONOCYTES # BLD AUTO: 0.52 10*3/MM3 (ref 0.1–0.9)
MONOCYTES NFR BLD AUTO: 6.5 % (ref 5–12)
NEUTROPHILS # BLD AUTO: 4.16 10*3/MM3 (ref 1.7–7)
NEUTROPHILS NFR BLD AUTO: 52.1 % (ref 42.7–76)
NITRITE UR QL STRIP: NEGATIVE
PH UR STRIP.AUTO: 5.5 [PH] (ref 4.5–8)
PLATELET # BLD AUTO: 246 10*3/MM3 (ref 140–450)
PMV BLD AUTO: 11.3 FL (ref 6–12)
POTASSIUM BLD-SCNC: 3.8 MMOL/L (ref 3.5–5.2)
PROT UR QL STRIP: NEGATIVE
RBC # BLD AUTO: 4.92 10*6/MM3 (ref 3.77–5.28)
RBC # UR: ABNORMAL /HPF
REF LAB TEST METHOD: ABNORMAL
SODIUM BLD-SCNC: 140 MMOL/L (ref 136–145)
SP GR UR STRIP: 1.02 (ref 1–1.03)
SQUAMOUS #/AREA URNS HPF: ABNORMAL /HPF
UROBILINOGEN UR QL STRIP: ABNORMAL
WBC NRBC COR # BLD: 7.98 10*3/MM3 (ref 3.4–10.8)
WBC UR QL AUTO: ABNORMAL /HPF

## 2019-07-16 PROCEDURE — 93010 ELECTROCARDIOGRAM REPORT: CPT | Performed by: INTERNAL MEDICINE

## 2019-07-16 PROCEDURE — 81001 URINALYSIS AUTO W/SCOPE: CPT | Performed by: EMERGENCY MEDICINE

## 2019-07-16 PROCEDURE — 99284 EMERGENCY DEPT VISIT MOD MDM: CPT

## 2019-07-16 PROCEDURE — 80048 BASIC METABOLIC PNL TOTAL CA: CPT | Performed by: EMERGENCY MEDICINE

## 2019-07-16 PROCEDURE — 99284 EMERGENCY DEPT VISIT MOD MDM: CPT | Performed by: EMERGENCY MEDICINE

## 2019-07-16 PROCEDURE — 70450 CT HEAD/BRAIN W/O DYE: CPT

## 2019-07-16 PROCEDURE — 93005 ELECTROCARDIOGRAM TRACING: CPT | Performed by: EMERGENCY MEDICINE

## 2019-07-16 PROCEDURE — 84703 CHORIONIC GONADOTROPIN ASSAY: CPT | Performed by: EMERGENCY MEDICINE

## 2019-07-16 PROCEDURE — 85025 COMPLETE CBC W/AUTO DIFF WBC: CPT | Performed by: EMERGENCY MEDICINE

## 2019-07-16 RX ORDER — HYDROXYZINE HYDROCHLORIDE 25 MG/1
25 TABLET, FILM COATED ORAL 3 TIMES DAILY PRN
COMMUNITY

## 2019-07-16 RX ORDER — ASPIRIN 81 MG/1
324 TABLET, CHEWABLE ORAL ONCE
Status: COMPLETED | OUTPATIENT
Start: 2019-07-16 | End: 2019-07-16

## 2019-07-16 RX ORDER — AMITRIPTYLINE HYDROCHLORIDE 25 MG/1
25 TABLET, FILM COATED ORAL NIGHTLY PRN
Qty: 6 TABLET | Refills: 0 | Status: SHIPPED | OUTPATIENT
Start: 2019-07-16

## 2019-07-16 RX ORDER — SODIUM CHLORIDE 0.9 % (FLUSH) 0.9 %
10 SYRINGE (ML) INJECTION AS NEEDED
Status: DISCONTINUED | OUTPATIENT
Start: 2019-07-16 | End: 2019-07-16 | Stop reason: HOSPADM

## 2019-07-16 RX ADMIN — SODIUM CHLORIDE 1000 ML: 9 INJECTION, SOLUTION INTRAVENOUS at 12:21

## 2019-07-16 RX ADMIN — ASPIRIN 81 MG 324 MG: 81 TABLET ORAL at 12:22

## 2019-08-27 ENCOUNTER — OFFICE VISIT (OUTPATIENT)
Dept: CARDIOLOGY | Facility: CLINIC | Age: 29
End: 2019-08-27

## 2019-08-27 VITALS
HEIGHT: 69 IN | WEIGHT: 233 LBS | DIASTOLIC BLOOD PRESSURE: 86 MMHG | BODY MASS INDEX: 34.51 KG/M2 | HEART RATE: 98 BPM | SYSTOLIC BLOOD PRESSURE: 124 MMHG

## 2019-08-27 DIAGNOSIS — R07.89 OTHER CHEST PAIN: Primary | ICD-10-CM

## 2019-08-27 PROCEDURE — 93000 ELECTROCARDIOGRAM COMPLETE: CPT | Performed by: INTERNAL MEDICINE

## 2019-08-27 PROCEDURE — 99204 OFFICE O/P NEW MOD 45 MIN: CPT | Performed by: INTERNAL MEDICINE

## 2019-08-27 NOTE — PROGRESS NOTES
Subjective:     Encounter Date:08/27/2019      Patient ID: Costa Watson is a 29 y.o. female.    Chief Complaint: cardiac clearance  History of Present Illness    This patient presents to the office today for initial evaluation consultation.  She comes in for assessment of cardiac risk prior to undergoing occupational therapy.    This is her first visit here at [facility], [place].  She was admitted to [facility] in October 2018.  She presented there for chest discomfort.  EKG was normal but troponin was elevated.  She was ultimately diagnosed with a type II myocardial injury.  She underwent cardiac catheterization on 9/19/2018 which showed normal coronary arteries and no plaque.  No narrowing or stenosis was seen.  She then underwent an echocardiogram that demonstrated normal biventricular size and function and normal valvular structure and function.  Chamber dimensions were normal.  She had a cardiac MRI to evaluate for possible pericarditis or myocarditis and the cardiac MRI was normal.  No late gadolinium enhancement was seen.  CT of her chest demonstrated right hilar mass and adenopathy.  She was seen by thoracic surgery and underwent biopsy.  She tells me that she was ultimately diagnosed with histo although I do not have those records.    Since being discharged she is had fatigue and dyspnea with activity but no other complaints.  Rare palpitations, no tachycardia.  No lower extremity edema.  No orthopnea or PND.  She has had generalized fatigue.    The following portions of the patient's history were reviewed and updated as appropriate: allergies, current medications, past family history, past medical history, past social history, past surgical history and problem list.    Past Medical History:   Diagnosis Date   • Acid reflux    • Anxiety    • C. difficile colitis    • Chest pain    • Colitis    • Cyst, sinus nasal    • Depression    • Fibromyalgia    • Fibromyalgia, primary    • Hilar mass    • Histoplasmosis    • Hx  of gastroesophageal reflux (GERD)    • Myocardial infarction (CMS/HCC) 09/09/2018   • Myocarditis (CMS/formerly [facility])    • NSTEMI (non-ST elevated myocardial infarction) (CMS/formerly [facility])    • Ovarian cyst    • Palpitations    • PCOS (polycystic ovarian syndrome)    • Vaginitis        Past Surgical History:   Procedure Laterality Date   • CARDIAC CATHETERIZATION     • MEDIASTINOSCOPY         Social History     Socioeconomic History   • Marital status: Single     Spouse name: Not on file   • Number of children: Not on file   • Years of education: Not on file   • Highest education level: Not on file   Tobacco Use   • Smoking status: Current Every Day Smoker     Packs/day: 0.50     Years: 10.00     Pack years: 5.00     Types: Cigarettes   • Smokeless tobacco: Never Used   Substance and Sexual Activity   • Alcohol use: No   • Drug use: No   • Sexual activity: Yes     Partners: Female, Male     Birth control/protection: None       Review of Systems   Constitution: Negative for chills, decreased appetite, fever and night sweats.   HENT: Negative for ear discharge, ear pain, hearing loss, nosebleeds and sore throat.    Eyes: Negative for blurred vision, double vision and pain.   Cardiovascular: Negative for cyanosis.   Respiratory: Negative for hemoptysis and sputum production.    Endocrine: Negative for cold intolerance and heat intolerance.   Hematologic/Lymphatic: Negative for adenopathy.   Skin: Negative for dry skin, itching, nail changes, rash and suspicious lesions.   Musculoskeletal: Negative for arthritis, gout, muscle cramps, muscle weakness, myalgias and neck pain.   Gastrointestinal: Negative for anorexia, bowel incontinence, constipation, diarrhea, dysphagia, hematemesis and jaundice.   Genitourinary: Negative for bladder incontinence, dysuria, flank pain, frequency, hematuria and nocturia.   Neurological: Negative for focal weakness, numbness, paresthesias and seizures.   Psychiatric/Behavioral: Negative for altered mental  "status, hallucinations, hypervigilance, suicidal ideas and thoughts of violence.   Allergic/Immunologic: Negative for persistent infections.         ECG 12 Lead  Date/Time: 8/27/2019 2:37 PM  Performed by: Bowen Chew III, MD  Authorized by: Bowen Chew III, MD   Comparison: compared with previous ECG   Similar to previous ECG  Rhythm: sinus rhythm  Rate: normal  Conduction: conduction normal  ST Segments: ST segments normal  T Waves: T waves normal  QRS axis: normal  Other: no other findings    Clinical impression: normal ECG               Objective:     Vitals:    08/27/19 1117   BP: 124/86   Pulse: 98   Weight: 106 kg (233 lb)   Height: 175.3 cm (69\")         Physical Exam   Constitutional: She is oriented to person, place, and time. She appears well-developed and well-nourished. No distress.   HENT:   Head: Normocephalic and atraumatic.   Nose: Nose normal.   Mouth/Throat: Oropharynx is clear and moist.   Eyes: Conjunctivae and EOM are normal. Pupils are equal, round, and reactive to light. Right eye exhibits no discharge. Left eye exhibits no discharge.   Neck: Normal range of motion. Neck supple. No tracheal deviation present. No thyromegaly present.   Cardiovascular: Normal rate, regular rhythm, S1 normal, S2 normal, normal heart sounds and normal pulses. Exam reveals no S3.   Pulmonary/Chest: Effort normal and breath sounds normal. No stridor. No respiratory distress. She exhibits no tenderness.   Abdominal: Soft. Bowel sounds are normal. She exhibits no distension and no mass. There is no tenderness. There is no rebound and no guarding.   Musculoskeletal: Normal range of motion. She exhibits no tenderness or deformity.   Lymphadenopathy:     She has no cervical adenopathy.   Neurological: She is alert and oriented to person, place, and time. She has normal reflexes.   Skin: Skin is warm and dry. No rash noted. She is not diaphoretic. No erythema.   Psychiatric: She has a normal mood and affect. " Thought content normal.       Lab Review:             Performed        Assessment:          Diagnosis Plan   1. Other chest pain  ECG 12 Lead          Plan:       1.  Patient is at low cardiac risk for occupational therapy  2.  Patient had a conference cardiac evaluation performed at [facility] in September 2018 and no cardiac pathology was seen.  Troponin elevation was felt to be type II cardiac injury secondary to the acute infection.  Thank you very much for allowing us to participate in the care of this pleasant patient.  Please don't hesitate to call if I can be of assistance in any way.      Current Outpatient Medications:   •  amitriptyline (ELAVIL) 25 MG tablet, Take 1 tablet by mouth At Night As Needed (headache)., Disp: 6 tablet, Rfl: 0  •  hydrOXYzine (ATARAX) 25 MG tablet, Take 25 mg by mouth 3 (Three) Times a Day As Needed for Itching., Disp: , Rfl:   •  omeprazole (priLOSEC) 20 MG capsule, Take 20 mg by mouth Daily., Disp: , Rfl:   •  sertraline (ZOLOFT) 100 MG tablet, Take 1 tablet by mouth Daily., Disp: 30 tablet, Rfl: 11

## 2019-08-29 ENCOUNTER — OFFICE VISIT (OUTPATIENT)
Dept: OBSTETRICS AND GYNECOLOGY | Facility: CLINIC | Age: 29
End: 2019-08-29

## 2019-08-29 ENCOUNTER — PROCEDURE VISIT (OUTPATIENT)
Dept: OBSTETRICS AND GYNECOLOGY | Facility: CLINIC | Age: 29
End: 2019-08-29

## 2019-08-29 VITALS
DIASTOLIC BLOOD PRESSURE: 70 MMHG | BODY MASS INDEX: 34.36 KG/M2 | HEIGHT: 69 IN | SYSTOLIC BLOOD PRESSURE: 112 MMHG | WEIGHT: 232 LBS

## 2019-08-29 DIAGNOSIS — Z87.42 HISTORY OF PCOS: Primary | ICD-10-CM

## 2019-08-29 DIAGNOSIS — N93.9 ABNORMAL UTERINE BLEEDING (AUB): ICD-10-CM

## 2019-08-29 DIAGNOSIS — R10.2 PELVIC PAIN: Primary | ICD-10-CM

## 2019-08-29 DIAGNOSIS — Z87.42 HISTORY OF PCOS: ICD-10-CM

## 2019-08-29 PROCEDURE — 76830 TRANSVAGINAL US NON-OB: CPT | Performed by: NURSE PRACTITIONER

## 2019-08-29 PROCEDURE — 99214 OFFICE O/P EST MOD 30 MIN: CPT | Performed by: NURSE PRACTITIONER

## 2019-08-29 RX ORDER — ACETAMINOPHEN AND CODEINE PHOSPHATE 120; 12 MG/5ML; MG/5ML
1 SOLUTION ORAL DAILY
Qty: 28 TABLET | Refills: 12 | Status: SHIPPED | OUTPATIENT
Start: 2019-08-29 | End: 2020-08-28

## 2019-08-29 RX ORDER — SPIRONOLACTONE 50 MG/1
50 TABLET, FILM COATED ORAL DAILY
Qty: 30 TABLET | Refills: 2 | Status: SHIPPED | OUTPATIENT
Start: 2019-08-29 | End: 2019-12-08 | Stop reason: SDUPTHER

## 2019-09-02 LAB
A VAGINAE DNA VAG QL NAA+PROBE: NORMAL SCORE
BVAB2 DNA VAG QL NAA+PROBE: NORMAL SCORE
C ALBICANS DNA VAG QL NAA+PROBE: NEGATIVE
C GLABRATA DNA VAG QL NAA+PROBE: NEGATIVE
C TRACH RRNA SPEC QL NAA+PROBE: NEGATIVE
LABORATORY COMMENT REPORT: ABNORMAL
M GENITALIUM DNA SPEC QL NAA+PROBE: NEGATIVE
M HOMINIS DNA SPEC QL NAA+PROBE: POSITIVE
MEGA1 DNA VAG QL NAA+PROBE: NORMAL SCORE
N GONORRHOEA RRNA SPEC QL NAA+PROBE: NEGATIVE
T VAGINALIS RRNA SPEC QL NAA+PROBE: NEGATIVE
UREAPLASMA DNA SPEC QL NAA+PROBE: POSITIVE

## 2019-09-02 RX ORDER — DOXYCYCLINE 100 MG/1
100 CAPSULE ORAL 2 TIMES DAILY
Qty: 14 CAPSULE | Refills: 0 | Status: SHIPPED | OUTPATIENT
Start: 2019-09-02 | End: 2019-09-09

## 2019-09-03 ENCOUNTER — TELEPHONE (OUTPATIENT)
Dept: CARDIOLOGY | Facility: CLINIC | Age: 29
End: 2019-09-03

## 2019-09-03 NOTE — TELEPHONE ENCOUNTER
Pt called to request that a letter to be sent to her  be emailed to [email or web address], stating that she has no restrictions for her physical.  Pt's last ov was on 08/27/19.       It pt states that she does not know how to get the number, but only contact through email.        I have sent a secure email to request a fax number to fax Dr. Chew office note which indicates pt is low risk.

## 2019-10-09 ENCOUNTER — TRANSCRIBE ORDERS (OUTPATIENT)
Dept: PAIN MEDICINE | Facility: HOSPITAL | Age: 29
End: 2019-10-09

## 2019-10-09 DIAGNOSIS — M79.7 FIBROMYALGIA MUSCLE PAIN: Primary | ICD-10-CM

## 2019-12-10 RX ORDER — SPIRONOLACTONE 50 MG/1
TABLET, FILM COATED ORAL
Qty: 30 TABLET | Refills: 1 | Status: SHIPPED | OUTPATIENT
Start: 2019-12-10

## 2023-10-18 ENCOUNTER — OFFICE VISIT (OUTPATIENT)
Dept: FAMILY MEDICINE CLINIC | Facility: CLINIC | Age: 33
End: 2023-10-18
Payer: MEDICAID

## 2023-10-18 VITALS
BODY MASS INDEX: 36.73 KG/M2 | OXYGEN SATURATION: 97 % | HEART RATE: 108 BPM | DIASTOLIC BLOOD PRESSURE: 90 MMHG | RESPIRATION RATE: 18 BRPM | SYSTOLIC BLOOD PRESSURE: 130 MMHG | HEIGHT: 69 IN | WEIGHT: 248 LBS

## 2023-10-18 DIAGNOSIS — E34.9 HORMONE IMBALANCE: ICD-10-CM

## 2023-10-18 DIAGNOSIS — F64.0 GENDER DYSPHORIA IN ADULT: Primary | ICD-10-CM

## 2023-10-18 DIAGNOSIS — R00.0 TACHYCARDIA: ICD-10-CM

## 2023-10-18 PROBLEM — J34.1 MUCOUS RETENTION CYST OF MAXILLARY SINUS: Status: RESOLVED | Noted: 2017-01-01 | Resolved: 2023-10-18

## 2023-10-18 PROCEDURE — 1159F MED LIST DOCD IN RCRD: CPT | Performed by: FAMILY MEDICINE

## 2023-10-18 PROCEDURE — 99203 OFFICE O/P NEW LOW 30 MIN: CPT | Performed by: FAMILY MEDICINE

## 2023-10-18 PROCEDURE — 1160F RVW MEDS BY RX/DR IN RCRD: CPT | Performed by: FAMILY MEDICINE

## 2023-10-18 RX ORDER — ARIPIPRAZOLE 20 MG/1
TABLET ORAL
COMMUNITY
Start: 2023-09-14

## 2023-10-18 RX ORDER — ATOMOXETINE 40 MG/1
40 CAPSULE ORAL EVERY MORNING
COMMUNITY
Start: 2023-07-10

## 2023-10-18 RX ORDER — TESTOSTERONE CYPIONATE 200 MG/ML
INJECTION, SOLUTION INTRAMUSCULAR
Qty: 10 ML | Refills: 1 | Status: SHIPPED | OUTPATIENT
Start: 2023-10-18

## 2023-10-18 RX ORDER — TESTOSTERONE CYPIONATE 100 MG/ML
INJECTION, SOLUTION INTRAMUSCULAR
COMMUNITY
Start: 2023-06-30 | End: 2023-10-18

## 2023-10-18 NOTE — PROGRESS NOTES
"Chief Complaint  Establish Care, Gender Dysphoria in Adult, and Rapid Heart Rate    Subjective    History of Present Illness {CC  Problem List  Visit  Diagnosis   Encounters  Notes  Medications  Labs  Result Review Imaging  Media :23}     Costa Watson presents to [facility] for Establish Care, Gender Dysphoria in Adult, and Rapid Heart Rate.  History of Present Illness     Here today to establish care and to discuss gender affirming hormone therapy.    Started testosterone in May of this year. Had to move from [place] to [place] rather abruptly within the past couple months. Wanted to establish care to ensure ongoing access to his testosterone. Has been doing subcutaneous injections once weekly. Reports no problems with self-administration, no injection site reactions. Is overall happy with the current state of his transition. Had labs done recently that were overall reassuring.    Has noticed some tachycardia since starting testosterone. Interestingly started atomoxetine around the same time. Continues to follow-up with a provider from [place] via video visit. Has an upcoming visit. Has not tried any other medications for ADHD. Has not tried any vacations from atomoxetine to see if it contributes to the tachycardia.    Objective     Vital Signs:   /90   Pulse 108   Resp 18   Ht 175.3 cm (69\")   Wt 112 kg (248 lb)   SpO2 97%   BMI 36.62 kg/m²   Physical Exam  Vitals and nursing note reviewed.   Constitutional:       General: He is not in acute distress.     Appearance: Normal appearance. He is not ill-appearing.   Cardiovascular:      Rate and Rhythm: Normal rate and regular rhythm.      Pulses: Normal pulses.      Heart sounds: Normal heart sounds. No murmur heard.  Pulmonary:      Effort: Pulmonary effort is normal. No respiratory distress.      Breath sounds: Normal breath sounds. No rales.   Neurological:      Mental Status: He is alert and " oriented to person, place, and time. Mental status is at baseline.   Psychiatric:         Mood and Affect: Mood normal.         Behavior: Behavior normal.          Result Review  Data Reviewed:{ Labs  Result Review  Imaging  Med Tab  Media :23}                   Assessment and Plan {CC Problem List  Visit Diagnosis  ROS  Review (Popup)  Health Maintenance  Quality  BestPractice  Medications  SmartSets  SnapShot Encounters  Media :23}   Diagnoses and all orders for this visit:    1. Gender dysphoria in adult (Primary)  -     Testosterone Cypionate (DEPOTESTOTERONE CYPIONATE) 200 MG/ML injection; Inject 0.3 ml (60 mg) subcutaneously once weekly.  Dispense: 10 mL; Refill: 1    2. Hormone imbalance  -     Testosterone Cypionate (DEPOTESTOTERONE CYPIONATE) 200 MG/ML injection; Inject 0.3 ml (60 mg) subcutaneously once weekly.  Dispense: 10 mL; Refill: 1    3. Tachycardia    Orders as above. Continue testosterone as prescribed. We will plan on labs in about 3 months.    Discussed potential etiologies for the tachycardia. For more likely to blame this on atomoxetine and testosterone. Suggested he try to go a few days without the atomoxetine by way of testing or therapy. He will keep me updated with his progress.    Plan on follow-up as below. Encouraged communication via [employer] in the meantime.    Patient was given instructions and counseling regarding his condition or for health maintenance advice. Please see specific information pulled into the AVS (placed there by myself) if appropriate.    Return in about 3 months (around 1/18/2024), or if symptoms worsen or fail to improve, for f/u gender-affirming hormone therapy.      ALEX Alvarez MD

## 2023-10-18 NOTE — PATIENT INSTRUCTIONS
"Check pulse, count for 6 seconds, multiply by 10.    Transmasc Resources    [facility]    [facility] Transgender Support Group - Facebook group and Discord     World Professional Association for Transgender Health, Standards of Care  https://www.wpath.org/publications/soc - pages 37 and 40 especially     [facility], Transgender Health - http://transhealth.[email or web address]/guidelines      [facility] [place] - [email or web address]-[email or web address]    [facility] - https://[email or web address]/care/medical/transgender-health/     Parents, Families, and Friends of Lesbians and [place] - https://www.pflag.org/ourtranslovedones  - good document for friends/family who need to learn the basics     [facility] for Transgender [place] - https://transequality.org/documents  - helps with name change, gender marker change, etc, is state specific     * * * * * *    Insurance will typically cover hormones, but if they don't, or if you need to pay out of pocket, use the following site.    www.Promodity     Can get injection supplies online - [employer]. Recommended supplies as below:     1 ml, Luer-Daryn Syringes      20G x 1” hypodermic needles for drawing up      27 G 1/2\" hypodermic needles for injecting       [facility] Trans Health Injection Guide -   https://[employer].org/wp-content/uploads/MG-6_TransHealth_InjectionGuide.pdf     Injection supplies:  Syringes   Needles   Alcohol swabs  Cotton balls/band-aids  Sharps container       "

## 2023-10-25 DIAGNOSIS — R56.9 SEIZURE-LIKE ACTIVITY: Primary | ICD-10-CM

## 2023-11-09 ENCOUNTER — HOSPITAL ENCOUNTER (OUTPATIENT)
Dept: SLEEP MEDICINE | Facility: HOSPITAL | Age: 33
Discharge: HOME | End: 2023-11-09
Payer: MEDICAID

## 2023-11-09 DIAGNOSIS — R56.9 SEIZURE-LIKE ACTIVITY: ICD-10-CM

## 2023-11-09 PROCEDURE — 95812 EEG 41-60 MINUTES: CPT

## 2024-01-18 ENCOUNTER — OFFICE VISIT (OUTPATIENT)
Dept: FAMILY MEDICINE CLINIC | Facility: CLINIC | Age: 34
End: 2024-01-18
Payer: COMMERCIAL

## 2024-01-18 VITALS
HEIGHT: 69 IN | WEIGHT: 255 LBS | DIASTOLIC BLOOD PRESSURE: 78 MMHG | BODY MASS INDEX: 37.77 KG/M2 | OXYGEN SATURATION: 95 % | SYSTOLIC BLOOD PRESSURE: 120 MMHG | HEART RATE: 97 BPM | RESPIRATION RATE: 18 BRPM

## 2024-01-18 DIAGNOSIS — E34.9 HORMONE IMBALANCE: ICD-10-CM

## 2024-01-18 DIAGNOSIS — F64.0 GENDER DYSPHORIA IN ADULT: ICD-10-CM

## 2024-01-18 DIAGNOSIS — Z51.81 THERAPEUTIC DRUG MONITORING: ICD-10-CM

## 2024-01-18 DIAGNOSIS — Z00.00 ROUTINE HEALTH MAINTENANCE: Primary | ICD-10-CM

## 2024-01-18 DIAGNOSIS — R53.82 CHRONIC FATIGUE: ICD-10-CM

## 2024-01-18 DIAGNOSIS — Z23 NEED FOR VACCINATION: ICD-10-CM

## 2024-01-18 PROBLEM — E66.812 CLASS 2 OBESITY DUE TO EXCESS CALORIES WITHOUT SERIOUS COMORBIDITY WITH BODY MASS INDEX (BMI) OF 37.0 TO 37.9 IN ADULT: Status: ACTIVE | Noted: 2024-01-18

## 2024-01-18 PROBLEM — E66.09 CLASS 2 OBESITY DUE TO EXCESS CALORIES WITHOUT SERIOUS COMORBIDITY WITH BODY MASS INDEX (BMI) OF 37.0 TO 37.9 IN ADULT: Status: ACTIVE | Noted: 2024-01-18

## 2024-01-18 RX ORDER — ATOMOXETINE 25 MG/1
25 CAPSULE ORAL DAILY
COMMUNITY

## 2024-01-18 NOTE — PROGRESS NOTES
"Chief Complaint  Annual Exam    Subjective    History of Present Illness    Costa Watson presents to [facility] for Annual Exam.  History of Present Illness     Here today for an annual exam and to discuss preventive health maintenance. Would also like to follow-up on gender affirming hormone therapy.    Doing fairly well overall. No urgent questions or concerns at this time. Continues on aripiprazole and atomoxetine for management of anxiety, depression, and attention deficit. Sees a psychiatric provider for these prescriptions. Was told to get some blood work checked to monitor tolerance.    Continues on testosterone as prescribed. No problems with self administration, no injection site reactions. Due for check of hormone levels today. Generally happy with the state of his transition.    Has some questions about fatigue and insomnia. Also has questions about risk of diabetes and obesity. Admits that his diet is suboptimal and he does not exercise regularly. Labs have been negative for diabetes in the past however weight is up since last visit. Plans on working on this in the near future but admits that he generally lacks motivation.    Due for pneumonia and tetanus vaccines today. Also needs flu and COVID vaccines. Believes that he may be overdue for a Pap smear.    Has decent family and social support.     Objective     Vital Signs:   /78   Pulse 97   Resp 18   Ht 175.3 cm (69\")   Wt 116 kg (255 lb)   SpO2 95%   BMI 37.66 kg/m²   Physical Exam  Vitals and nursing note reviewed.   Constitutional:       General: He is not in acute distress.     Appearance: Normal appearance. He is not ill-appearing.   Cardiovascular:      Rate and Rhythm: Normal rate and regular rhythm.      Pulses: Normal pulses.      Heart sounds: Normal heart sounds. No murmur heard.  Pulmonary:      Effort: Pulmonary effort is normal. No respiratory distress.      Breath sounds: Normal breath sounds. " Can refill meds before then, which ones does she need? No rales.   Neurological:      Mental Status: He is alert and oriented to person, place, and time. Mental status is at baseline.   Psychiatric:         Mood and Affect: Mood normal.         Behavior: Behavior normal.          Result Review  Data Reviewed:                  Assessment and Plan   Diagnoses and all orders for this visit:    1. Routine health maintenance (Primary)    2. Gender dysphoria in adult  -     Testosterone  -     CBC (No Diff)  -     Lipid Panel With LDL / HDL Ratio  -     Comprehensive Metabolic Panel    3. Hormone imbalance  -     Testosterone  -     CBC (No Diff)  -     Lipid Panel With LDL / HDL Ratio  -     Comprehensive Metabolic Panel    4. Chronic fatigue  -     Vitamin B12  -     Vitamin D,25-Hydroxy  -     TSH Rfx On Abnormal To Free T4    5. Therapeutic drug monitoring  -     Vitamin B12  -     Vitamin D,25-Hydroxy  -     Testosterone  -     CBC (No Diff)  -     Lipid Panel With LDL / HDL Ratio  -     Comprehensive Metabolic Panel    6. Need for vaccination  -     Pneumococcal Conjugate Vaccine 20-Valent (PCV20)  -     Tdap Vaccine Greater Than or Equal To 6yo IM    Orders as above. I will contact him with results as available. Discussed possible etiologies of fatigue. Will check a few vitamin levels as above. Suspect that fatigue would improve with diet and exercise. Discussed various ways to improve this going forward.    Vaccines as discussed.    Class 2 Severe Obesity (BMI >=35 and <=39.9). Obesity-related health conditions include the following: none. Obesity is worsening. BMI is is above average; BMI management plan is completed. We discussed portion control and increasing exercise.    Encouraged to continue working on healthy lifestyle habits. Recommended follow up as below. Encouraged communication via [employer] in the meantime.     Patient was given instructions and counseling regarding his condition or for health maintenance advice. Please see specific information pulled into  the AVS (placed there by myself) if appropriate.    Return in about 6 months (around 7/18/2024), or if symptoms worsen or fail to improve, for f/u gender-affirming hormone therapy.    ALEX Alvarez MD    Prevention counseling performed as below: Mindfulness for stress management.

## 2024-01-18 NOTE — PATIENT INSTRUCTIONS
Mindfulness apps: Headspace, Smiling Mind, [place]!    Mindfulness-Based Stress Reduction  Mindfulness-based stress reduction (MBSR) is a program that helps people learn to practice mindfulness. Mindfulness is the practice of intentionally paying attention to the present moment. It can be learned and practiced through techniques such as education, breathing exercises, meditation, and yoga. MBSR includes several mindfulness techniques in one program.  MBSR works best when you understand the treatment, are willing to try new things, and can commit to spending time practicing what you learn. MBSR training may include learning about:  How your emotions, thoughts, and reactions affect your body.  New ways to respond to things that cause negative thoughts to start (triggers).  How to notice your thoughts and let go of them.  Practicing awareness of everyday things that you normally do without thinking.  The techniques and goals of different types of meditation.  What are the benefits of MBSR?  MBSR can have many benefits, which include helping you to:  Develop self-awareness. This refers to knowing and understanding yourself.  Learn skills and attitudes that help you to participate in your own health care.  Learn new ways to care for yourself.  Be more accepting about how things are, and let things go.  Be less judgmental and approach things with an open mind.  Be patient with yourself and trust yourself more.  MBSR has also been shown to:  Reduce negative emotions, such as depression and anxiety.  Improve memory and focus.  Change how you sense and approach pain.  Boost your body's ability to fight infections.  Help you connect better with other people.  Improve your sense of well-being.  Follow these instructions at home:    Find a local in-person or online MBSR program.  Set aside some time regularly for mindfulness practice.  Find a mindfulness practice that works best for you. This may include one or more of the  following:  Meditation. Meditation involves focusing your mind on a certain thought or activity.  Breathing awareness exercises. These help you to stay present by focusing on your breath.  Body scan. For this practice, you lie down and pay attention to each part of your body from head to toe. You can identify tension and soreness and intentionally relax parts of your body.  Yoga. Yoga involves stretching and breathing, and it can improve your ability to move and be flexible. It can also provide an experience of testing your body's limits, which can help you release stress.  Mindful eating. This way of eating involves focusing on the taste, texture, color, and smell of each bite of food. Because this slows down eating and helps you feel full sooner, it can be an important part of a weight-loss plan.  Find a podcast or recording that provides guidance for breathing awareness, body scan, or meditation exercises. You can listen to these any time when you have a free moment to rest without distractions.  Follow your treatment plan as told by your health care provider. This may include taking regular medicines and making changes to your diet or lifestyle as recommended.  How to practice mindfulness  To do a basic awareness exercise:  Find a comfortable place to sit.  Pay attention to the present moment. Observe your thoughts, feelings, and surroundings just as they are.  Avoid placing judgment on yourself, your feelings, or your surroundings. Make note of any judgment that comes up, and let it go.  Your mind may wander, and that is okay. Make note of when your thoughts drift, and return your attention to the present moment.  To do basic mindfulness meditation:  Find a comfortable place to sit. This may include a stable chair or a firm floor cushion.  Sit upright with your back straight. Let your arms fall next to your side with your hands resting on your legs.  If sitting in a chair, rest your feet flat on the floor.  If  sitting on a cushion, cross your legs in front of you.  Keep your head in a neutral position with your chin dropped slightly. Relax your jaw and rest the tip of your tongue on the roof of your mouth. Drop your gaze to the floor. You can close your eyes if you like.  Breathe normally and pay attention to your breath. Feel the air moving in and out of your nose. Feel your belly expanding and relaxing with each breath.  Your mind may wander, and that is okay. Make note of when your thoughts drift, and return your attention to your breath.  Avoid placing judgment on yourself, your feelings, or your surroundings. Make note of any judgment or feelings that come up, let them go, and bring your attention back to your breath.  When you are ready, lift your gaze or open your eyes. Pay attention to how your body feels after the meditation.  Where to find more information  You can find more information about MBSR from:  Your health care provider.  Community-based meditation centers or programs.  Programs offered near you.  Summary  Mindfulness-based stress reduction (MBSR) is a program that teaches you how to intentionally pay attention to the present moment. It is used with other treatments to help you cope better with daily stress, emotions, and pain.  MBSR focuses on developing self-awareness, which allows you to respond to life stress without judgment or negative emotions.  MBSR programs may involve learning different mindfulness practices, such as breathing exercises, meditation, yoga, body scan, or mindful eating. Find a mindfulness practice that works best for you, and set aside time for it on a regular basis.  This information is not intended to replace advice given to you by your health care provider. Make sure you discuss any questions you have with your health care provider.  Document Released: 04/26/2018 Document Revised: 11/30/2018 Document Reviewed: 04/26/2018  Elsevier Patient Education © 2020 Elsevier Inc.

## 2024-01-19 LAB
25(OH)D3+25(OH)D2 SERPL-MCNC: 19 NG/ML (ref 30–100)
ALBUMIN SERPL-MCNC: 4.6 G/DL (ref 3.9–4.9)
ALBUMIN/GLOB SERPL: 1.7 {RATIO} (ref 1.2–2.2)
ALP SERPL-CCNC: 95 IU/L (ref 44–121)
ALT SERPL-CCNC: 32 IU/L (ref 0–32)
AST SERPL-CCNC: 19 IU/L (ref 0–40)
BILIRUB SERPL-MCNC: 0.3 MG/DL (ref 0–1.2)
BUN SERPL-MCNC: 11 MG/DL (ref 6–20)
BUN/CREAT SERPL: 10 (ref 9–23)
CALCIUM SERPL-MCNC: 9.9 MG/DL (ref 8.7–10.2)
CHLORIDE SERPL-SCNC: 99 MMOL/L (ref 96–106)
CHOLEST SERPL-MCNC: 251 MG/DL (ref 100–199)
CO2 SERPL-SCNC: 25 MMOL/L (ref 20–29)
CREAT SERPL-MCNC: 1.14 MG/DL (ref 0.57–1)
EGFRCR SERPLBLD CKD-EPI 2021: 66 ML/MIN/1.73
ERYTHROCYTE [DISTWIDTH] IN BLOOD BY AUTOMATED COUNT: 13.7 % (ref 11.7–15.4)
GLOBULIN SER CALC-MCNC: 2.7 G/DL (ref 1.5–4.5)
GLUCOSE SERPL-MCNC: 82 MG/DL (ref 70–99)
HCT VFR BLD AUTO: 53.6 % (ref 34–46.6)
HDLC SERPL-MCNC: 38 MG/DL
HGB BLD-MCNC: 17.9 G/DL (ref 11.1–15.9)
LDLC SERPL CALC-MCNC: 170 MG/DL (ref 0–99)
LDLC/HDLC SERPL: 4.5 RATIO (ref 0–3.2)
MCH RBC QN AUTO: 29.2 PG (ref 26.6–33)
MCHC RBC AUTO-ENTMCNC: 33.4 G/DL (ref 31.5–35.7)
MCV RBC AUTO: 87 FL (ref 79–97)
PLATELET # BLD AUTO: 257 X10E3/UL (ref 150–450)
POTASSIUM SERPL-SCNC: 4.6 MMOL/L (ref 3.5–5.2)
PROT SERPL-MCNC: 7.3 G/DL (ref 6–8.5)
RBC # BLD AUTO: 6.13 X10E6/UL (ref 3.77–5.28)
SODIUM SERPL-SCNC: 139 MMOL/L (ref 134–144)
TESTOST SERPL-MCNC: 364 NG/DL (ref 8–60)
TRIGL SERPL-MCNC: 231 MG/DL (ref 0–149)
TSH SERPL DL<=0.005 MIU/L-ACNC: 3.11 UIU/ML (ref 0.45–4.5)
VIT B12 SERPL-MCNC: 381 PG/ML (ref 232–1245)
VLDLC SERPL CALC-MCNC: 43 MG/DL (ref 5–40)
WBC # BLD AUTO: 9.5 X10E3/UL (ref 3.4–10.8)

## 2024-02-07 RX ORDER — AZELASTINE HCL 205.5 UG/1
2 SPRAY NASAL DAILY
Qty: 11 ML | Refills: 2 | Status: SHIPPED | OUTPATIENT
Start: 2024-02-07

## 2024-03-04 ENCOUNTER — TELEMEDICINE (OUTPATIENT)
Dept: FAMILY MEDICINE CLINIC | Facility: TELEHEALTH | Age: 34
End: 2024-03-04
Payer: COMMERCIAL

## 2024-03-04 DIAGNOSIS — J30.2 SEASONAL ALLERGIC RHINITIS, UNSPECIFIED TRIGGER: Primary | ICD-10-CM

## 2024-03-04 RX ORDER — SERTRALINE HYDROCHLORIDE 100 MG/1
200 TABLET, FILM COATED ORAL DAILY
COMMUNITY
Start: 2024-02-09

## 2024-03-04 RX ORDER — BROMPHENIRAMINE MALEATE, PSEUDOEPHEDRINE HYDROCHLORIDE, AND DEXTROMETHORPHAN HYDROBROMIDE 2; 30; 10 MG/5ML; MG/5ML; MG/5ML
5 SYRUP ORAL 3 TIMES DAILY PRN
Qty: 150 ML | Refills: 0 | Status: SHIPPED | OUTPATIENT
Start: 2024-03-04

## 2024-03-04 RX ORDER — ATOMOXETINE 40 MG/1
40 CAPSULE ORAL DAILY
COMMUNITY
Start: 2024-02-09

## 2024-03-04 RX ORDER — BUSPIRONE HYDROCHLORIDE 7.5 MG/1
7.5 TABLET ORAL 2 TIMES DAILY
COMMUNITY
Start: 2024-02-09

## 2024-03-04 NOTE — PROGRESS NOTES
CHIEF COMPLAINT  Chief Complaint   Patient presents with    Cough         HPI  Costa Watson is a 33 y.o. adult  presents with complaint of cough with white mucus that tastes bad and sneezing. The cough hurts his chest.   He thinks it maybe allergies.     Review of Systems   Constitutional:  Negative for chills, diaphoresis, fatigue and fever.   HENT:  Positive for postnasal drip, rhinorrhea and sneezing. Negative for congestion.    Respiratory:  Positive for cough and chest tightness. Negative for shortness of breath and wheezing.    Cardiovascular:  Positive for chest pain (burns with coughing).   Musculoskeletal:  Negative for myalgias.   Neurological:  Negative for headaches.       Past Medical History:   Diagnosis Date    Acid reflux     Anxiety     C. difficile colitis     Chest pain     Colitis     Cyst, sinus nasal     Depression     Fibromyalgia     Fibromyalgia, primary     Hilar mass     Histoplasmosis     Hx of gastroesophageal reflux (GERD)     Myocardial infarction 09/09/2018    Myocarditis     NSTEMI (non-ST elevated myocardial infarction)     Ovarian cyst     Palpitations     PCOS (polycystic ovarian syndrome)     Vaginitis        Family History   Problem Relation Age of Onset    Osteoporosis Maternal Grandmother     Heart attack Maternal Grandmother         unknown    Heart disease Maternal Grandmother         unknown    Diabetes Father     Arthritis Father     Alzheimer's disease Mother     Stroke Mother     Breast cancer Paternal Aunt     Prostate cancer Paternal Uncle     Heart attack Maternal Grandfather         unknown    Heart disease Maternal Grandfather         unknown    Ovarian cancer Neg Hx     Colon cancer Neg Hx        Social History     Socioeconomic History    Marital status: Single   Tobacco Use    Smoking status: Every Day     Current packs/day: 0.50     Average packs/day: 0.5 packs/day for 10.0 years (5.0 ttl pk-yrs)     Types: Cigarettes     Passive exposure: Never    Smokeless  tobacco: Never    Tobacco comments:     He is aware of consequences of smoking.    Vaping Use    Vaping status: Never Used   Substance and Sexual Activity    Alcohol use: No    Drug use: No    Sexual activity: Yes     Partners: Female, Male     Birth control/protection: None           PHYSICAL EXAM  Physical Exam   Constitutional: He is oriented to person, place, and time. He appears well-developed and well-nourished. He does not have a sickly appearance. He does not appear ill. No distress.   HENT:   Head: Normocephalic and atraumatic.   Nose: Congestion present.   Eyes: EOM are normal. Right eye exhibits edema.       Neck: Neck normal appearance.  Pulmonary/Chest: Effort normal.  No respiratory distress.  Neurological: He is alert and oriented to person, place, and time.   Skin: Skin is dry.   Psychiatric: He has a normal mood and affect.           Diagnoses and all orders for this visit:    1. Seasonal allergic rhinitis, unspecified trigger (Primary)    Other orders  -     brompheniramine-pseudoephedrine-DM 30-2-10 MG/5ML syrup; Take 5 mL by mouth 3 (Three) Times a Day As Needed for Allergies, Cough or Congestion.  Dispense: 150 mL; Refill: 0    Vs early cold symptoms.     The use of a video visit has been reviewed with the patient and verbal informed consent has been obtained. Myself and Costa Watson participated in this visit. The patient is located in [address] [place] [address]. I am located in [place], [place]. [employer] and [employer] were utilized.       Note Disclaimer: At [facility], we believe that sharing information builds trust and better   relationships. You are receiving this note because you recently visited [facility]. It is possible you   will see health information before a provider has talked with you about it. This kind of information can   be easy to misunderstand. To help you fully understand what it means for your health, we urge you to   discuss this note with your  provider.    Leeann Rivera, APRN  03/04/2024  10:46 EST

## 2024-03-04 NOTE — PATIENT INSTRUCTIONS
Drink plenty of water  Over the counter pain relievers okay   If symptoms do not improve in 3-5 days follow up with your primary care provider or urgent care  If symptoms worsen follow up with urgent care or the emergency room         Allergic Rhinitis, Adult  Allergic rhinitis is a reaction to allergens. Allergens are things that can cause an allergic reaction. This condition affects the lining inside the nose (mucous membrane).  There are two types of allergic rhinitis:  Seasonal. This type is also called hay fever. It happens only during some times of the year.  Perennial. This type can happen at any time of the year.  This condition cannot be spread from person to person (is not contagious). It can be mild, bad, or very bad. It can develop at any age and may be outgrown.  What are the causes?  Pollen from grasses, trees, and weeds. Other causes can be:  Dust mites.  Smoke.  Mold.  Car fumes.  The pee (urine), spit, or dander of pets. Dander is dead skin cells from a pet.  What increases the risk?  You are more likely to develop this condition if:  You have allergies in your family.  You have problems like allergies in your family. You may have:  Swelling of parts of your eyes and eyelids.  Asthma. This affects how you breathe.  Long-term redness and swelling on your skin.  Food allergies.  What are the signs or symptoms?  The main symptom of this condition is a runny or stuffy nose (nasal congestion). Other symptoms may include:  Sneezing or coughing.  Itching and tearing of your eyes.  Mucus that drips down the back of your throat (postnasal drip). This may cause a sore throat.  Trouble sleeping.  Feeling tired.  Headache.  How is this treated?  There is no cure for this condition. You should avoid things that you are allergic to. Treatment can help to relieve symptoms. This may include:  Medicines that block allergy symptoms, such as anti-inflammatories or antihistamines. These may be given as a shot, nasal  spray, or pill.  Avoiding things you are allergic to.  Medicines that give you some of what you are allergic to over time. This is called immunotherapy. It is done if other treatments do not help. You may get:  Shots.  Medicine under your tongue.  Stronger medicines, if other treatments do not help.  Follow these instructions at home:  Avoiding allergens  Find out what things you are allergic to and avoid them. To do this, try these things:  If you get allergies any time of year:  Replace carpet with wood, tile, or vinyl aimee. Carpet can trap pet dander and dust.  Do not smoke. Do not allow smoking in your home.  Change your heating and air conditioning filters at least once a month.  If you get allergies only some times of the year:  Keep windows closed when you can.  Plan things to do outside when pollen counts are lowest. Check pollen counts before you plan things to do outside.  When you come indoors, change your clothes and shower before you sit on furniture or bedding.  If you are allergic to a pet:  Keep the pet out of your bedroom.  Vacuum, sweep, and dust often.  General instructions  Take over-the-counter and prescription medicines only as told by your doctor.  Drink enough fluid to keep your pee pale yellow.  Where to find more information  American Academy of Allergy, Asthma & Immunology: aaaai.org  Contact a doctor if:  You have a fever.  You get a cough that does not go away.  You make high-pitched whistling sounds when you breathe, most often when you breathe out (wheeze).  Your symptoms slow you down.  Your symptoms stop you from doing your normal things each day.  Get help right away if:  You are short of breath.  This symptom may be an emergency. Get help right away. Call 911.  Do not wait to see if the symptom will go away.  Do not drive yourself to the hospital.  This information is not intended to replace advice given to you by your health care provider. Make sure you discuss any questions  you have with your health care provider.  Document Revised: 08/28/2023 Document Reviewed: 08/28/2023  Elsevier Patient Education © 2023 Elsevier Inc.

## 2024-03-07 ENCOUNTER — OFFICE VISIT (OUTPATIENT)
Dept: FAMILY MEDICINE CLINIC | Facility: CLINIC | Age: 34
End: 2024-03-07
Payer: COMMERCIAL

## 2024-03-07 VITALS
HEART RATE: 120 BPM | OXYGEN SATURATION: 96 % | DIASTOLIC BLOOD PRESSURE: 88 MMHG | BODY MASS INDEX: 37.77 KG/M2 | WEIGHT: 255 LBS | SYSTOLIC BLOOD PRESSURE: 124 MMHG | RESPIRATION RATE: 18 BRPM | HEIGHT: 69 IN

## 2024-03-07 DIAGNOSIS — R19.7 DIARRHEA OF PRESUMED INFECTIOUS ORIGIN: ICD-10-CM

## 2024-03-07 DIAGNOSIS — R05.1 ACUTE COUGH: Primary | ICD-10-CM

## 2024-03-07 DIAGNOSIS — R11.0 NAUSEA: ICD-10-CM

## 2024-03-07 LAB
EXPIRATION DATE: NORMAL
FLUAV AG UPPER RESP QL IA.RAPID: NOT DETECTED
FLUBV AG UPPER RESP QL IA.RAPID: NOT DETECTED
INTERNAL CONTROL: NORMAL
Lab: NORMAL
SARS-COV-2 AG UPPER RESP QL IA.RAPID: NOT DETECTED

## 2024-03-07 PROCEDURE — 1159F MED LIST DOCD IN RCRD: CPT | Performed by: FAMILY MEDICINE

## 2024-03-07 PROCEDURE — 87428 SARSCOV & INF VIR A&B AG IA: CPT | Performed by: FAMILY MEDICINE

## 2024-03-07 PROCEDURE — 99214 OFFICE O/P EST MOD 30 MIN: CPT | Performed by: FAMILY MEDICINE

## 2024-03-07 PROCEDURE — 1160F RVW MEDS BY RX/DR IN RCRD: CPT | Performed by: FAMILY MEDICINE

## 2024-03-07 RX ORDER — ONDANSETRON 4 MG/1
4 TABLET, ORALLY DISINTEGRATING ORAL EVERY 8 HOURS PRN
Qty: 30 TABLET | Refills: 0 | Status: SHIPPED | OUTPATIENT
Start: 2024-03-07

## 2024-03-07 NOTE — PROGRESS NOTES
"Chief Complaint  Cough (X4 days ), Shortness of Breath, Fatigue, and Diarrhea    Subjective    History of Present Illness    Costa Watson presents to [facility] for Cough (X4 days ), Shortness of Breath, Fatigue, and Diarrhea.  History of Present Illness     Here today with symptoms as above. Has been feeling ill for about 4 days. Everyone in the household has had similar symptoms. Costa seems to be the last 1 suffering. Main symptom at this point is ongoing diarrhea and fatigue. Trying to stay well-hydrated, not drinking much in the way of calorie containing fluids.    Also has some cough and mild shortness of breath. The symptoms have been slowly improving over the last few days. No further fevers or chills. Has some slight concern for flu or COVID.    Objective     Vital Signs:   /88   Pulse 120   Resp 18   Ht 175.3 cm (69\")   Wt 116 kg (255 lb)   SpO2 96%   BMI 37.66 kg/m²   Physical Exam  Vitals and nursing note reviewed.   Constitutional:       General: He is not in acute distress.     Appearance: Normal appearance. He is not ill-appearing.   HENT:      Nose: Mucosal edema present.      Mouth/Throat:      Mouth: Mucous membranes are moist.      Pharynx: Posterior oropharyngeal erythema present. No oropharyngeal exudate.   Cardiovascular:      Rate and Rhythm: Normal rate and regular rhythm.      Pulses: Normal pulses.      Heart sounds: Normal heart sounds. No murmur heard.  Pulmonary:      Effort: Pulmonary effort is normal. No respiratory distress.      Breath sounds: Normal breath sounds. No rales.   Lymphadenopathy:      Cervical: No cervical adenopathy.   Neurological:      Mental Status: He is alert and oriented to person, place, and time. Mental status is at baseline.   Psychiatric:         Mood and Affect: Mood normal.         Behavior: Behavior normal.                        Assessment and Plan   Diagnoses and all orders for this visit:    1. Acute cough " (Primary)  -     POCT SARS-CoV-2 + Flu Antigen GARCIA    2. Diarrhea of presumed infectious origin  -     ondansetron ODT (ZOFRAN-ODT) 4 MG disintegrating tablet; Place 1 tablet on the tongue Every 8 (Eight) Hours As Needed for Nausea or Vomiting.  Dispense: 30 tablet; Refill: 0    3. Nausea  -     ondansetron ODT (ZOFRAN-ODT) 4 MG disintegrating tablet; Place 1 tablet on the tongue Every 8 (Eight) Hours As Needed for Nausea or Vomiting.  Dispense: 30 tablet; Refill: 0    Point-of-care testing negative for flu and COVID. Will treat ongoing nausea with ondansetron as above. Might help with diarrhea as well. Encouraged hydration and rest in the meantime.    Recommended follow up as below. Encouraged communication via MyChart in the meantime.     Patient was given instructions and counseling regarding his condition or for health maintenance advice. Please see specific information pulled into the AVS (placed there by myself) if appropriate.    Return if symptoms worsen or fail to improve.    ALEX Alvarez MD

## 2024-03-29 DIAGNOSIS — F64.0 GENDER DYSPHORIA IN ADULT: ICD-10-CM

## 2024-03-29 DIAGNOSIS — E34.9 HORMONE IMBALANCE: ICD-10-CM

## 2024-03-29 RX ORDER — TESTOSTERONE CYPIONATE 200 MG/ML
INJECTION, SOLUTION INTRAMUSCULAR
Qty: 4 ML | Refills: 2 | Status: SHIPPED | OUTPATIENT
Start: 2024-03-29

## 2024-04-10 DIAGNOSIS — E34.9 HORMONE IMBALANCE: ICD-10-CM

## 2024-04-10 DIAGNOSIS — F64.0 GENDER DYSPHORIA IN ADULT: Primary | ICD-10-CM

## 2024-04-17 ENCOUNTER — OFFICE VISIT (OUTPATIENT)
Dept: OBSTETRICS AND GYNECOLOGY | Age: 34
End: 2024-04-17
Payer: COMMERCIAL

## 2024-04-17 VITALS
BODY MASS INDEX: 38.51 KG/M2 | WEIGHT: 260 LBS | DIASTOLIC BLOOD PRESSURE: 72 MMHG | SYSTOLIC BLOOD PRESSURE: 128 MMHG | HEIGHT: 69 IN

## 2024-04-17 DIAGNOSIS — R10.2 PELVIC PAIN: ICD-10-CM

## 2024-04-17 DIAGNOSIS — Z12.4 SCREENING FOR CERVICAL CANCER: ICD-10-CM

## 2024-04-17 DIAGNOSIS — Z11.3 SCREEN FOR STD (SEXUALLY TRANSMITTED DISEASE): ICD-10-CM

## 2024-04-17 DIAGNOSIS — N94.10 FEMALE DYSPAREUNIA: ICD-10-CM

## 2024-04-17 DIAGNOSIS — Z01.419 WELL WOMAN EXAM WITH ROUTINE GYNECOLOGICAL EXAM: Primary | ICD-10-CM

## 2024-04-17 DIAGNOSIS — F64.0 GENDER DYSPHORIA IN ADULT: ICD-10-CM

## 2024-04-17 DIAGNOSIS — Z76.89 ENCOUNTER TO ESTABLISH CARE: ICD-10-CM

## 2024-04-17 DIAGNOSIS — N95.2 VAGINAL ATROPHY: ICD-10-CM

## 2024-04-17 PROBLEM — Z87.42 HISTORY OF PCOS: Status: RESOLVED | Noted: 2017-10-23 | Resolved: 2024-04-17

## 2024-04-17 PROBLEM — G62.9 NEUROPATHY: Status: RESOLVED | Noted: 2017-01-01 | Resolved: 2024-04-17

## 2024-04-17 PROBLEM — F41.1 GENERALIZED ANXIETY DISORDER: Status: RESOLVED | Noted: 2017-03-31 | Resolved: 2024-04-17

## 2024-04-17 PROBLEM — F32.A DEPRESSION: Status: RESOLVED | Noted: 2017-03-31 | Resolved: 2024-04-17

## 2024-04-17 PROBLEM — N92.6 IRREGULAR MENSES: Status: RESOLVED | Noted: 2017-10-23 | Resolved: 2024-04-17

## 2024-04-17 RX ORDER — ESTRADIOL 10 UG/1
INSERT VAGINAL
Qty: 24 TABLET | Refills: 3 | Status: SHIPPED | OUTPATIENT
Start: 2024-04-18

## 2024-04-17 RX ORDER — OLANZAPINE AND SAMIDORPHAN L-MALATE 10; 10 MG/1; MG/1
TABLET, FILM COATED ORAL
COMMUNITY

## 2024-04-17 NOTE — PROGRESS NOTES
"[facility]   Obstetrics and Gynecology   Routine Annual Visit    2024    Patient: Costa Watson          MR#:[id number]    History of Present Illness    Chief Complaint   Patient presents with    Annual Exam    Establish Care     NGYN - Gender dysphoria, Last pap 01/10/2019 neg, Hysterectomy consult, Wants STD screening, Hx PCOS       33 y.o. adult  who presents for annual exam.  On testosterone since 2023 and amenorrheic.  He reports bilateral stabbing pain intermittently without clear trigger.  He also reports a h/o PCOS with abnormal menses prior to starting testosterone.  He was recommended a hysterectomy for AUB when younger but he was undecided about shoulder at that time so did not pursue.  Bleeding has improved since starting testosterone but he still has pain.    He also notes vaginal dryness and pain with intercourse.  He also has postcoital bleeding and feels a tearing sensation with penetration.    -Identifies as male, prefers he/him   -Started testosterone May 2023.  Previously managed by provider in [place] but has recently transition to Dr. Alvarez.  -Denies h/o abnormal paps, last one around   -Very certain he is not interested in childbearing  -H/o histoplasmosis 2018, reports \"mild MI\" at that time, heart cath and cardiac MRI normal, hilar mass noted and biopsied to confirm dx, echo with normal EF, managed by  Cardiologist, treated in [place], no further management recommended  -H/o anxiety and depression with schizoaffective disorder, managed by Zoloft/Buspar/Lyvalvi, sees psych and therapist regularly  -smokes tobacco, 1 ppd    Here with his girlfriend.    Obstetric History:  OB History          0    Para   0    Term   0       0    AB   0    Living   0         SAB   0    IAB   0    Ectopic   0    Molar        Multiple   0    Live Births              Obstetric Comments   No plans              Menstrual History:     No LMP recorded (lmp " unknown). (Menstrual status: Other).       Sexual History:   Sexually active with female partner, declines contraception  Desires STD screening today      Social History:   Working on acquiring disability    ________________________________________  Patient Active Problem List   Diagnosis    Tobacco dependence    Gender dysphoria in adult    Hormone imbalance    Chronic fatigue    Class 2 obesity due to excess calories without serious comorbidity with body mass index (BMI) of 37.0 to 37.9 in adult     Past Medical History:   Diagnosis Date    Acid reflux     ADHD (attention deficit hyperactivity disorder)     Allergic     Pollen/mold/cats    Anxiety     Arthritis     C. difficile colitis     Cyst, sinus nasal     Depression     Diverticulosis     Fibromyalgia     Hilar mass     histoplasmosis    Histoplasmosis     Hx of gastroesophageal reflux (GERD)     Low back pain     NSTEMI (non-ST elevated myocardial infarction) 09/09/2018    related to histoplasmosis    Obesity     Palpitations     PCOS (polycystic ovarian syndrome)     Schizoaffective disorder      Past Surgical History:   Procedure Laterality Date    CARDIAC CATHETERIZATION      CHOLECYSTECTOMY      laparoscopic    MEDIASTINOSCOPY       Social History     Tobacco Use   Smoking Status Every Day    Current packs/day: 1.00    Average packs/day: 1 pack/day for 10.0 years (10.0 ttl pk-yrs)    Types: Cigarettes    Passive exposure: Never   Smokeless Tobacco Never   Tobacco Comments    He is aware of consequences of smoking.      Family History   Problem Relation Age of Onset    Diabetes Father     Arthritis Father     Alzheimer's disease Mother     Stroke Mother     Osteoporosis Maternal Grandmother     Heart attack Maternal Grandmother         unknown    Heart disease Maternal Grandmother         unknown    Heart attack Maternal Grandfather         unknown    Heart disease Maternal Grandfather         unknown    Breast cancer Paternal Aunt     Prostate cancer  "Paternal Uncle     Ovarian cancer Neg Hx     Colon cancer Neg Hx     Uterine cancer Neg Hx      Prior to Admission medications    Medication Sig Start Date End Date Taking? Authorizing Provider   atomoxetine (STRATTERA) 40 MG capsule Take 1 capsule by mouth Daily. 2/9/24  Yes Greta Trivedi MD   busPIRone (BUSPAR) 7.5 MG tablet Take 1 tablet by mouth 2 (Two) Times a Day. 2/9/24  Yes Greta Trivedi MD   Lybalvi 10-10 MG tablet    Yes Greta Trivedi MD   ondansetron ODT (ZOFRAN-ODT) 4 MG disintegrating tablet Place 1 tablet on the tongue Every 8 (Eight) Hours As Needed for Nausea or Vomiting. 3/7/24  Yes Kwadwo Alvarez MD   sertraline (ZOLOFT) 100 MG tablet Take 2 tablets by mouth Daily. 2/9/24  Yes Greta Trivedi MD   Testosterone Cypionate (DEPOTESTOTERONE CYPIONATE) 200 MG/ML injection INJECT 0.3 MILLILITER SUBCUTANEOUSLY ONCE WEEKLY 3/29/24  Yes Kwadwo Alvarez MD   ARIPiprazole (ABILIFY) 20 MG tablet  9/14/23 4/17/24  Greta Trivedi MD   azelastine (ASTEPRO) 0.15 % solution nasal spray 2 sprays into the nostril(s) as directed by provider Daily. 2/7/24 4/17/24  Kwadwo Alvarez MD   brompheniramine-pseudoephedrine-DM 30-2-10 MG/5ML syrup Take 5 mL by mouth 3 (Three) Times a Day As Needed for Allergies, Cough or Congestion. 3/4/24 4/17/24  Leeann Rivera APRN     ________________________________________    Current contraception: none  History of abnormal Pap smear: no  Family history of uterine or ovarian cancer: no  Family History of colon cancer/colon polyps: no  History of abnormal mammogram: no    The following portions of the patient's history were reviewed and updated as appropriate: allergies, current medications, past family history, past medical history, past social history, past surgical history, and problem list.    Review of Systems   All other systems reviewed and are negative.           Objective     /72   Ht 175.3 cm (69\")  " " Wt 118 kg (260 lb)   LMP  (LMP Unknown) Comment: Testosterone use  Breastfeeding No   BMI 38.40 kg/m²    BP Readings from Last 3 Encounters:   04/17/24 128/72   03/07/24 124/88   01/18/24 120/78      Wt Readings from Last 3 Encounters:   04/17/24 118 kg (260 lb)   03/07/24 116 kg (255 lb)   01/18/24 116 kg (255 lb)        BMI: Estimated body mass index is 38.4 kg/m² as calculated from the following:    Height as of this encounter: 175.3 cm (69\").    Weight as of this encounter: 118 kg (260 lb).    Physical Exam  Vitals and nursing note reviewed.   Constitutional:       General: He is not in acute distress.     Appearance: Normal appearance.   HENT:      Head: Normocephalic and atraumatic.   Eyes:      Extraocular Movements: Extraocular movements intact.   Cardiovascular:      Rate and Rhythm: Normal rate and regular rhythm.      Pulses: Normal pulses.      Heart sounds: No murmur heard.  Pulmonary:      Effort: Pulmonary effort is normal. No respiratory distress.      Breath sounds: Normal breath sounds.   Chest:   Breasts:     Right: Normal. No mass, nipple discharge, skin change or tenderness.      Left: Normal. No mass, nipple discharge, skin change or tenderness.   Abdominal:      General: There is no distension.      Palpations: Abdomen is soft. There is no mass.      Tenderness: There is no abdominal tenderness.   Genitourinary:     General: Normal vulva.      Labia:         Right: No rash or lesion.         Left: No rash or lesion.       Urethra: No prolapse, urethral swelling or urethral lesion.      Vagina: Tenderness (due to atrophy) present.      Cervix: Normal.      Uterus: Normal. Tender.       Adnexa:         Right: Tenderness present. No mass or fullness.          Left: Tenderness present. No mass or fullness.        Comments: Bladder: no masses or tenderness  Perineum/Anus: no masses, lesions, or skin changes    Significant vaginal atrophy with narrowing of canal, clitorimegaly  Musculoskeletal: "         General: No swelling. Normal range of motion.      Cervical back: Normal range of motion.   Lymphadenopathy:      Upper Body:      Right upper body: No axillary adenopathy.      Left upper body: No axillary adenopathy.   Skin:     General: Skin is warm and dry.   Neurological:      General: No focal deficit present.      Mental Status: He is alert and oriented to person, place, and time.   Psychiatric:         Mood and Affect: Mood normal.         Behavior: Behavior normal.         As part of wellness and prevention, the following topics were discussed with the patient:  Encouraged self breast exam  Physical activity and regular exercised encouraged.   Injury prevention discussed.  Healthy weight discussed.  Nutrition discussed.  Smoking cessation discussed.  Substance abuse/misuse discussed.  Sexual behavior/safe practices discussed.   Sexual transmitted disease prevention   Contraception discussed.   Mental health discussed.       Patient is a smoker.      Assessment:  Diagnoses and all orders for this visit:    1. Well woman exam with routine gynecological exam (Primary)  -     IGP,CtNgTv,Apt HPV,rfx 16 / 18,45    2. Encounter to establish care    3. Screening for cervical cancer  -     IGP,CtNgTv,Apt HPV,rfx 16 / 18,45    4. Screen for STD (sexually transmitted disease)  -     IGP,CtNgTv,Apt HPV,rfx 16 / 18,45  -     HIV-1 / O / 2 Ag / Antibody  -     RPR, Rfx Qn RPR / Confirm TP    5. Pelvic pain    6. Gender dysphoria in adult  -     IGP,CtNgTv,Apt HPV,rfx 16 / 18,45    7. Female dyspareunia    8. Vaginal atrophy    Other orders  -     estradiol (Vagifem) 10 MCG tablet vaginal tablet; Insert 1 tablet into vagina nightly for 2 weeks and then twice weekly  Dispense: 24 tablet; Refill: 3      - Breast exam normal  - Pap today  - STD screen today  - Discussed patient's persistent pelvic pain despite medical therapy.  Patient has become amenorrheic since starting testosterone but pain has persisted.  He is  interested in definitive management with hysterectomy.  Discussed recommendation for pelvic ultrasound to identify possible causes of pain.  Will then discuss treatment options.  - Vaginal atrophy noted on exam.  Discussed treatment with vaginal estrogen.  Patient is amenable.  Rx Vagifem provided.  Discussed inserting 1 tablet at bedtime nightly for 2 weeks and then twice weekly.  Schedule ultrasound in 4 to 6 weeks to allow time for atrophy to improve.    Plan:  Return for Next f/u with gyn Us and vaginal atrophy.      Isi Zuniga MD  4/17/2024 14:13 EDT

## 2024-04-18 LAB
HIV 1+2 AB+HIV1 P24 AG SERPL QL IA: NON REACTIVE
RPR SER QL: NON REACTIVE

## 2024-04-21 LAB
C TRACH RRNA CVX QL NAA+PROBE: NEGATIVE
CYTOLOGIST CVX/VAG CYTO: NORMAL
CYTOLOGY CVX/VAG DOC CYTO: NORMAL
CYTOLOGY CVX/VAG DOC THIN PREP: NORMAL
DX ICD CODE: NORMAL
HPV GENOTYPE REFLEX: NORMAL
HPV I/H RISK 4 DNA CVX QL PROBE+SIG AMP: NEGATIVE
Lab: NORMAL
N GONORRHOEA RRNA CVX QL NAA+PROBE: NEGATIVE
OTHER STN SPEC: NORMAL
STAT OF ADQ CVX/VAG CYTO-IMP: NORMAL
T VAGINALIS RRNA SPEC QL NAA+PROBE: NEGATIVE

## 2024-04-25 ENCOUNTER — OFFICE VISIT (OUTPATIENT)
Dept: FAMILY MEDICINE CLINIC | Facility: CLINIC | Age: 34
End: 2024-04-25
Payer: COMMERCIAL

## 2024-04-25 VITALS
SYSTOLIC BLOOD PRESSURE: 120 MMHG | OXYGEN SATURATION: 97 % | HEART RATE: 101 BPM | RESPIRATION RATE: 18 BRPM | DIASTOLIC BLOOD PRESSURE: 90 MMHG | WEIGHT: 262.1 LBS | BODY MASS INDEX: 38.82 KG/M2 | HEIGHT: 69 IN

## 2024-04-25 DIAGNOSIS — F64.0 GENDER DYSPHORIA IN ADULT: Primary | ICD-10-CM

## 2024-04-25 DIAGNOSIS — M65.4 RADIAL STYLOID TENOSYNOVITIS OF LEFT HAND: ICD-10-CM

## 2024-04-25 DIAGNOSIS — R63.1 POLYDIPSIA: ICD-10-CM

## 2024-04-25 DIAGNOSIS — E34.9 HORMONE IMBALANCE: ICD-10-CM

## 2024-04-25 PROCEDURE — 1160F RVW MEDS BY RX/DR IN RCRD: CPT | Performed by: FAMILY MEDICINE

## 2024-04-25 PROCEDURE — 1159F MED LIST DOCD IN RCRD: CPT | Performed by: FAMILY MEDICINE

## 2024-04-25 PROCEDURE — 99214 OFFICE O/P EST MOD 30 MIN: CPT | Performed by: FAMILY MEDICINE

## 2024-04-25 NOTE — PROGRESS NOTES
"Chief Complaint  lump on stomach    Subjective    History of Present Illness    Costa Watson presents to [facility] for lump on stomach.  History of Present Illness     Here with a number of issues today. Main concern was a lump in his stomach thought potentially to be secondary to a testosterone injection. Seems to have resolved already. He is interested in switching from testosterone cypionate to Aveed if possible. Is having a hard time with weekly injections.    Worried that he slept on his left wrist wrong. Has been quite painful over the left thumb and left radial wrist. No other inciting event.    Lastly has had some increased polydipsia of late. Worried about the potential for diabetes. Previous lab testing has been negative though would like to repeat this. Has also been fatigued and has been complaining to his therapist. Therapist recommended testing for diabetes.    Objective     Vital Signs:   /90   Pulse 101   Resp 18   Ht 175.3 cm (69\")   Wt 119 kg (262 lb 1.6 oz)   SpO2 97%   BMI 38.71 kg/m²   Physical Exam  Vitals and nursing note reviewed.   Constitutional:       General: He is not in acute distress.     Appearance: Normal appearance. He is not ill-appearing.   Cardiovascular:      Rate and Rhythm: Normal rate and regular rhythm.      Pulses: Normal pulses.      Heart sounds: Normal heart sounds. No murmur heard.  Pulmonary:      Effort: Pulmonary effort is normal. No respiratory distress.      Breath sounds: Normal breath sounds. No rales.   Musculoskeletal:      Left wrist: Tenderness present.      Left hand: Tenderness present.      Comments: Positive Finkelstein on the left.   Neurological:      Mental Status: He is alert and oriented to person, place, and time. Mental status is at baseline.   Psychiatric:         Mood and Affect: Mood normal.         Behavior: Behavior normal.              Assessment and Plan   Diagnoses and all orders for this " visit:    1. Gender dysphoria in adult (Primary)  -     Testosterone Undecanoate 750 MG/3ML solution; Inject 750 mg into the appropriate muscle as directed by prescriber Every 3 (Three) Months.  Dispense: 3 mL; Refill: 1    2. Hormone imbalance  -     Testosterone Undecanoate 750 MG/3ML solution; Inject 750 mg into the appropriate muscle as directed by prescriber Every 3 (Three) Months.  Dispense: 3 mL; Refill: 1    3. Radial styloid tenosynovitis of left hand    4. Polydipsia  -     Comprehensive Metabolic Panel  -     Hemoglobin A1c    Orders as above. Happy to see if we can get Aveed covered. Anticipate the need for prior authorization. He will keep me updated with his progress.    Labs as above. I will contact him with orders as available.    Gave him an Ace wrap for his left wrist. Appears to be a tenosynovitis. Anticipate resolution with time. Encouraged rest and topical NSAIDs.    Recommended follow up as below. Encouraged communication via MyChart in the meantime.     Patient was given instructions and counseling regarding his condition or for health maintenance advice. Please see specific information pulled into the AVS (placed there by myself) if appropriate.    Return in about 3 months (around 7/25/2024), or if symptoms worsen or fail to improve, for f/u gender-affirming hormone therapy.    ALEX Alvarez MD

## 2024-04-26 ENCOUNTER — TELEMEDICINE (OUTPATIENT)
Dept: FAMILY MEDICINE CLINIC | Facility: TELEHEALTH | Age: 34
End: 2024-04-26
Payer: COMMERCIAL

## 2024-04-26 DIAGNOSIS — L30.9 ECZEMA, UNSPECIFIED TYPE: Primary | ICD-10-CM

## 2024-04-26 LAB
ALBUMIN SERPL-MCNC: 4.2 G/DL (ref 3.5–5.2)
ALBUMIN/GLOB SERPL: 1.8 G/DL
ALP SERPL-CCNC: 86 U/L (ref 39–117)
ALT SERPL-CCNC: 33 U/L (ref 1–33)
AST SERPL-CCNC: 22 U/L (ref 1–32)
BILIRUB SERPL-MCNC: 0.2 MG/DL (ref 0–1.2)
BUN SERPL-MCNC: 12 MG/DL (ref 6–20)
BUN/CREAT SERPL: 11.5 (ref 7–25)
CALCIUM SERPL-MCNC: 9.4 MG/DL (ref 8.6–10.5)
CHLORIDE SERPL-SCNC: 101 MMOL/L (ref 98–107)
CO2 SERPL-SCNC: 25.6 MMOL/L (ref 22–29)
CREAT SERPL-MCNC: 1.04 MG/DL (ref 0.57–1)
EGFRCR SERPLBLD CKD-EPI 2021: 73.8 ML/MIN/1.73
GLOBULIN SER CALC-MCNC: 2.4 GM/DL
GLUCOSE SERPL-MCNC: 114 MG/DL (ref 65–99)
HBA1C MFR BLD: 5.8 % (ref 4.8–5.6)
POTASSIUM SERPL-SCNC: 4.1 MMOL/L (ref 3.5–5.2)
PROT SERPL-MCNC: 6.6 G/DL (ref 6–8.5)
SODIUM SERPL-SCNC: 137 MMOL/L (ref 136–145)

## 2024-04-26 RX ORDER — TRIAMCINOLONE ACETONIDE 1 MG/G
1 OINTMENT TOPICAL 2 TIMES DAILY
Qty: 56 G | Refills: 0 | Status: SHIPPED | OUTPATIENT
Start: 2024-04-26 | End: 2024-05-24

## 2024-04-26 RX ORDER — METHYLPREDNISOLONE 4 MG/1
TABLET ORAL
Qty: 1 EACH | Refills: 0 | Status: SHIPPED | OUTPATIENT
Start: 2024-04-26

## 2024-04-26 NOTE — PATIENT INSTRUCTIONS
Eczema  Eczema refers to a group of skin conditions that cause skin to become rough and inflamed. Each type of eczema has different triggers, symptoms, and treatments. Eczema of any type is usually itchy. Symptoms range from mild to severe.  Eczema is not spread from person to person (is not contagious). It can appear on different parts of the body at different times. One person's eczema may look different from another person's eczema.  What are the causes?  The exact cause of this condition is not known. However, exposure to certain environmental factors, irritants, and allergens can make the condition worse.  What are the signs or symptoms?    Symptoms of this condition depend on the type of eczema you have. The types include:  Contact dermatitis. There are two kinds:  Irritant contact dermatitis. This happens when something irritates the skin and causes a rash.  Allergic contact dermatitis. This happens when your skin comes in contact with something you are allergic to (allergens). This can include poison ivy, chemicals, or medicines that were applied to your skin.  Atopic dermatitis. This is a long-term (chronic) skin disease that keeps coming back (recurring). It is the most common type of eczema. Usual symptoms are a red rash and itchy, dry, scaly skin. It usually starts showing signs in infancy and can last through adulthood.  Dyshidrotic eczema. This is a form of eczema on the hands and feet. It shows up as very itchy, fluid-filled blisters. It can affect people of any age but is more common before age 40.  Hand eczema. This causes very itchy areas of skin on the palms and sides of the hands and fingers. This type of eczema is common in industrial jobs where you may be exposed to different types of irritants.  Lichen simplex chronicus. This type of eczema occurs when a person constantly scratches one area of the body. Repeated scratching of the area leads to thickened skin (lichenification). This condition  can accompany other types of eczema. It is more common in adults but may also be seen in children.  Nummular eczema. This is a common type of eczema that most often affects the lower legs and the backs of the hands. It typically causes an itchy, red, circular, crusty lesion (plaque). Scratching may become a habit and can cause bleeding. Nummular eczema occurs most often in middle-aged or older people.  Seborrheic dermatitis. This is a common skin disease that mainly affects the scalp. It may also affect other oily areas of the body, such as the face, sides of the nose, eyebrows, ears, eyelids, and chest. It is marked by small scaling and redness of the skin (erythema). This can affect people of all ages. In infants, this condition is called cradle cap.  Stasis dermatitis. This is a common skin disease that can cause itching, scaling, and hyperpigmentation, usually on the legs and feet. It occurs most often in people who have a condition that prevents blood from being pumped through the veins in the legs (chronic venous insufficiency). Stasis dermatitis is a chronic condition that needs long-term management.  How is this diagnosed?  This condition may be diagnosed based on:  A physical exam of your skin.  Your medical history.  Skin patch tests. These tests involve using patches that contain possible allergens and placing them on your back. Your health care provider will check in a few days to see if an allergic reaction occurred.  How is this treated?  Treatment for eczema is based on the type of eczema you have. You may be given hydrocortisone steroid medicine or antihistamines. These can relieve itching quickly and help reduce inflammation. These may be prescribed or purchased over the counter, depending on the strength that is needed.  Follow these instructions at home:  Take or apply over-the-counter and prescription medicines only as told by your health care provider.  Use creams or ointments to moisturize your  skin. Do not use lotions.  Learn what triggers or irritates your symptoms so you can avoid these things.  Treat symptom flare-ups quickly.  Do not scratch your skin. This can make your rash worse.  Keep all follow-up visits. This is important.  Where to find more information  American Academy of Dermatology: aad.org  National Eczema Association: nationaleczema.org  The Society for Pediatric Dermatology: pedsderm.net  Contact a health care provider if:  You have severe itching, even with treatment.  You scratch your skin regularly until it bleeds.  Your rash looks different than usual.  Your skin is painful, swollen, or more red than usual.  You have a fever.  Summary  Eczema refers to a group of skin conditions that cause skin to become rough and inflamed. Each type has different triggers.  Eczema of any type causes itching that may range from mild to severe.  Treatment varies based on the type of eczema you have. Hydrocortisone steroid medicine or antihistamines can help with itching and inflammation.  Protecting your skin is the best way to prevent eczema. Use creams or ointments to moisturize your skin. Avoid triggers and irritants. Treat flare-ups quickly.  This information is not intended to replace advice given to you by your health care provider. Make sure you discuss any questions you have with your health care provider.  Document Revised: 09/24/2021 Document Reviewed: 09/27/2021  Elsevier Patient Education © 2024 [employer]

## 2024-04-26 NOTE — PROGRESS NOTES
Chief Complaint   Patient presents with    Rash       Video Visit Reason:   Free Text Description: Face irritation and flaking/ Rashes  Subjective   Costa Watson is a 33 y.o. adult.     History of Present Illness  Rash on face that has been diagnosed as eczema in the past but is worse currently. He has tried different things but nothing seems to help. Currently the rash is around the mouth and he has some facial hair that is involved with some of the areas around the mouth, chin and between the eye area. He says that it is burning and very uncomfortable.  Rash  This is a recurrent problem. The current episode started more than 1 month ago. The problem has been worse since onset. The affected locations include the face. The rash is characterized by peeling, redness, itchiness and scaling. Pertinent negatives include no fever.       The following portions of the patient's history were reviewed and updated as appropriate: allergies, current medications, past medical history, and problem list.      Past Medical History:   Diagnosis Date    Acid reflux     ADHD (attention deficit hyperactivity disorder)     Allergic     Pollen/mold/cats    Anxiety     Arthritis     C. difficile colitis     Cyst, sinus nasal     Depression     Diverticulosis     Fibromyalgia     Hilar mass     histoplasmosis    Histoplasmosis     Hx of gastroesophageal reflux (GERD)     Hyperlipidemia     Low back pain     NSTEMI (non-ST elevated myocardial infarction) 09/09/2018    related to histoplasmosis    Obesity     Palpitations     PCOS (polycystic ovarian syndrome)     Schizoaffective disorder      Social History     Socioeconomic History    Marital status: Single   Tobacco Use    Smoking status: Every Day     Current packs/day: 1.00     Average packs/day: 1 pack/day for 20.0 years (20.0 ttl pk-yrs)     Types: Cigarettes     Passive exposure: Never    Smokeless tobacco: Never    Tobacco comments:     He is aware of consequences of smoking.     Vaping Use    Vaping status: Never Used   Substance and Sexual Activity    Alcohol use: No    Drug use: No    Sexual activity: Yes     Partners: Female     Birth control/protection: None     medication documentation: reviewed and updated with patient and   Current Outpatient Medications:     busPIRone (BUSPAR) 7.5 MG tablet, Take 1 tablet by mouth 2 (Two) Times a Day., Disp: , Rfl:     estradiol (Vagifem) 10 MCG tablet vaginal tablet, Insert 1 tablet into vagina nightly for 2 weeks and then twice weekly, Disp: 24 tablet, Rfl: 3    Lybalvi 10-10 MG tablet, , Disp: , Rfl:     Testosterone Cypionate (DEPOTESTOTERONE CYPIONATE) 200 MG/ML injection, INJECT 0.3 MILLILITER SUBCUTANEOUSLY ONCE WEEKLY, Disp: 4 mL, Rfl: 2    atomoxetine (STRATTERA) 40 MG capsule, Take 1 capsule by mouth Daily., Disp: , Rfl:     methylPREDNISolone (MEDROL) 4 MG dose pack, Take as directed on package instructions., Disp: 1 each, Rfl: 0    ondansetron ODT (ZOFRAN-ODT) 4 MG disintegrating tablet, Place 1 tablet on the tongue Every 8 (Eight) Hours As Needed for Nausea or Vomiting., Disp: 30 tablet, Rfl: 0    sertraline (ZOLOFT) 100 MG tablet, Take 2 tablets by mouth Daily., Disp: , Rfl:     Testosterone Undecanoate 750 MG/3ML solution, Inject 750 mg into the appropriate muscle as directed by prescriber Every 3 (Three) Months., Disp: 3 mL, Rfl: 1    triamcinolone (KENALOG) 0.1 % ointment, Apply 1 Application topically to the appropriate area as directed 2 (Two) Times a Day for 28 days., Disp: 56 g, Rfl: 0  Review of Systems   Constitutional:  Negative for chills and fever.   Skin:  Positive for rash.       Objective   Physical Exam  Constitutional:       General: He is not in acute distress.     Appearance: He is not ill-appearing.   Skin:     Findings: Rash (eczema on face diffusing, but around the mouth and on the chin) present.   Neurological:      Mental Status: He is alert.         Assessment & Plan   Diagnoses and all orders for this  visit:    1. Eczema, unspecified type (Primary)  -     methylPREDNISolone (MEDROL) 4 MG dose pack; Take as directed on package instructions.  Dispense: 1 each; Refill: 0  -     triamcinolone (KENALOG) 0.1 % ointment; Apply 1 Application topically to the appropriate area as directed 2 (Two) Times a Day for 28 days.  Dispense: 56 g; Refill: 0       Encouraged PCP and dermatology appointment.             Follow Up:  If your symptoms are not resolving by the completion of your treatment or are worsening, see your primary care provider for follow up. If you don't have a primary care provider, you may go to any Urgent Care for re-evaluation. If you develop any life threatening symptoms, go to the nearest Emergency Department immediately or call EMS.               The use of  Video Visit was utilized during this visit, using both [employer] and [employer]. The use of a video visit has been reviewed with the patient and verbal informed consent has been obtained. No technical difficulties occurred during the visit.    is located at [address] [place] [address]  Provider is located at [place], [place]

## 2024-04-29 ENCOUNTER — TELEPHONE (OUTPATIENT)
Dept: FAMILY MEDICINE CLINIC | Facility: CLINIC | Age: 34
End: 2024-04-29
Payer: COMMERCIAL

## 2024-04-29 RX ORDER — IBUPROFEN 600 MG/1
600 TABLET ORAL EVERY 8 HOURS PRN
Qty: 90 TABLET | Refills: 1 | Status: SHIPPED | OUTPATIENT
Start: 2024-04-29

## 2024-04-29 NOTE — TELEPHONE ENCOUNTER
PHARMACY CALLED WITH QUESTIONS ABOUT   AVEED 750 MG  WANTS TO KNOW IF PATIENT NEEDS THE INITIAL DOSE       PLEASE CALL AND ADVISE [phone]  [facility] - ARIELA Smith - Marely Smith - [phone]  - [phone]  [phone]

## 2024-04-29 NOTE — TELEPHONE ENCOUNTER
Pharmacy needed verbal OK for initial dose (every 4 weeks), as maintenance dose was sent to specialty pharmacy. Dr. Alvraez provided verbal order over the phone for pharmacy.     PA started with pharmacist via phone. Approval takes roughly 1-2 days for approval, per pharmacist.     PAs done via CoverMyMeds returned status that patient does not need PA for medication.

## 2024-05-01 LAB
ALBUMIN SERPL-MCNC: 4.2 G/DL (ref 3.5–5.2)
ALBUMIN/GLOB SERPL: 1.8 G/DL
ALP SERPL-CCNC: 86 U/L (ref 39–117)
ALT SERPL-CCNC: 33 U/L (ref 1–33)
AST SERPL-CCNC: 22 U/L (ref 1–32)
BILIRUB SERPL-MCNC: 0.2 MG/DL (ref 0–1.2)
BUN SERPL-MCNC: 12 MG/DL (ref 6–20)
BUN/CREAT SERPL: 11.1 (ref 7–25)
CALCIUM SERPL-MCNC: 9.4 MG/DL (ref 8.6–10.5)
CHLORIDE SERPL-SCNC: 101 MMOL/L (ref 98–107)
CO2 SERPL-SCNC: 25.6 MMOL/L (ref 22–29)
CREAT SERPL-MCNC: 1.08 MG/DL (ref 0.57–1)
EGFRCR SERPLBLD CKD-EPI 2021: 70.6 ML/MIN/1.73
GLOBULIN SER CALC-MCNC: 2.4 GM/DL
GLUCOSE SERPL-MCNC: 114 MG/DL (ref 65–99)
POTASSIUM SERPL-SCNC: 4.1 MMOL/L (ref 3.5–5.2)
PROT SERPL-MCNC: 6.6 G/DL (ref 6–8.5)
SODIUM SERPL-SCNC: 137 MMOL/L (ref 136–145)

## 2024-05-02 ENCOUNTER — TELEPHONE (OUTPATIENT)
Dept: FAMILY MEDICINE CLINIC | Facility: CLINIC | Age: 34
End: 2024-05-02
Payer: COMMERCIAL

## 2024-05-06 ENCOUNTER — CLINICAL SUPPORT (OUTPATIENT)
Dept: FAMILY MEDICINE CLINIC | Facility: CLINIC | Age: 34
End: 2024-05-06
Payer: COMMERCIAL

## 2024-05-06 DIAGNOSIS — E34.9 HORMONE IMBALANCE: ICD-10-CM

## 2024-05-06 DIAGNOSIS — F64.0 GENDER DYSPHORIA IN ADULT: Primary | ICD-10-CM

## 2024-05-06 PROCEDURE — 96372 THER/PROPH/DIAG INJ SC/IM: CPT | Performed by: FAMILY MEDICINE

## 2024-05-09 DIAGNOSIS — R40.0 UNCONTROLLED DAYTIME SOMNOLENCE: Primary | ICD-10-CM

## 2024-06-03 ENCOUNTER — CLINICAL SUPPORT (OUTPATIENT)
Dept: FAMILY MEDICINE CLINIC | Facility: CLINIC | Age: 34
End: 2024-06-03
Payer: COMMERCIAL

## 2024-06-03 DIAGNOSIS — E34.9 HORMONE IMBALANCE: ICD-10-CM

## 2024-06-03 DIAGNOSIS — F64.0 GENDER DYSPHORIA IN ADULT: Primary | ICD-10-CM

## 2024-06-03 PROCEDURE — 96372 THER/PROPH/DIAG INJ SC/IM: CPT | Performed by: FAMILY MEDICINE

## 2024-06-03 RX ORDER — TRIAMCINOLONE ACETONIDE 1 MG/G
1 OINTMENT TOPICAL 2 TIMES DAILY
Qty: 30 G | Refills: 1 | Status: SHIPPED | OUTPATIENT
Start: 2024-06-03

## 2024-06-03 NOTE — PROGRESS NOTES
Patient presented to office today for Aveed injection. Patient gets injection today, four weeks after his initial dose, as per medication administration instructions. Patient understands next injection will be done 10-12 weeks after today and for that timeframe throughout. Administered medication in left ventrogluteal, patient tolerated well. Documented post-injection due to provider needing to sign controlled substance order (testosterone).

## 2024-06-26 ENCOUNTER — TELEMEDICINE (OUTPATIENT)
Dept: FAMILY MEDICINE CLINIC | Facility: TELEHEALTH | Age: 34
End: 2024-06-26
Payer: COMMERCIAL

## 2024-06-26 VITALS — BODY MASS INDEX: 39.71 KG/M2 | WEIGHT: 262 LBS | HEIGHT: 68 IN

## 2024-06-26 DIAGNOSIS — L08.9 SKIN INFECTION: ICD-10-CM

## 2024-06-26 DIAGNOSIS — J40 BRONCHITIS: Primary | ICD-10-CM

## 2024-06-26 PROCEDURE — 99213 OFFICE O/P EST LOW 20 MIN: CPT | Performed by: NURSE PRACTITIONER

## 2024-06-26 RX ORDER — PREDNISONE 20 MG/1
TABLET ORAL
Qty: 13 TABLET | Refills: 0 | Status: SHIPPED | OUTPATIENT
Start: 2024-06-26

## 2024-06-26 RX ORDER — BROMPHENIRAMINE MALEATE, PSEUDOEPHEDRINE HYDROCHLORIDE, AND DEXTROMETHORPHAN HYDROBROMIDE 2; 30; 10 MG/5ML; MG/5ML; MG/5ML
5 SYRUP ORAL 4 TIMES DAILY PRN
Qty: 118 ML | Refills: 0 | Status: SHIPPED | OUTPATIENT
Start: 2024-06-26

## 2024-06-26 NOTE — PROGRESS NOTES
You have chosen to receive care through a telehealth visit.  Do you consent to use a video/audio connection for your medical care today? Yes     MIKEL Watson is a 33 y.o. adult  presents with complaint of cough, skin problem. He reports a painful cough and a belly button infection with bleeding. The cough started two days ago and is painful. It hurts his throat and his chest. It is productive at times and he is coughing up thick brown. Additional symtpoms that he is having with this are noted in the ROS portion of this visit. The skin problem is his bleeding belly button. It is deep and he has had both, staph and yeast in it in the past. He is cleaning it with alcohol and Q tip.     Review of Systems   Constitutional:  Negative for fever.   HENT:  Positive for congestion and postnasal drip.    Respiratory:  Positive for cough, chest tightness (at times) and wheezing. Negative for shortness of breath.         Productive at times, thick, brown   Skin:         Belly deep, bleeding, red, sore to touch       Past Medical History:   Diagnosis Date    Acid reflux     ADHD (attention deficit hyperactivity disorder)     Allergic     Pollen/mold/cats    Anxiety     Arthritis     C. difficile colitis     Cyst, sinus nasal     Depression     Diverticulosis     Fibromyalgia     Hilar mass     histoplasmosis    Histoplasmosis     Hx of gastroesophageal reflux (GERD)     Hyperlipidemia     Low back pain     NSTEMI (non-ST elevated myocardial infarction) 09/09/2018    related to histoplasmosis    Obesity     Palpitations     PCOS (polycystic ovarian syndrome)     Schizoaffective disorder        Family History   Problem Relation Age of Onset    Diabetes Father     Arthritis Father     Alzheimer's disease Mother     Stroke Mother     Osteoporosis Maternal Grandmother     Heart attack Maternal Grandmother         unknown    Heart disease Maternal Grandmother         unknown    Heart attack Maternal Grandfather         unknown     "Heart disease Maternal Grandfather         unknown    Breast cancer Paternal Aunt     Prostate cancer Paternal Uncle     Ovarian cancer Neg Hx     Colon cancer Neg Hx     Uterine cancer Neg Hx        Social History     Socioeconomic History    Marital status: Single   Tobacco Use    Smoking status: Every Day     Current packs/day: 1.00     Average packs/day: 1 pack/day for 20.0 years (20.0 ttl pk-yrs)     Types: Cigarettes     Passive exposure: Never    Smokeless tobacco: Never    Tobacco comments:     He is aware of consequences of smoking.    Vaping Use    Vaping status: Former   Substance and Sexual Activity    Alcohol use: No    Drug use: No    Sexual activity: Yes     Partners: Female     Birth control/protection: None       Costa Watson  reports that he has been smoking cigarettes. He has a 20 pack-year smoking history. He has never been exposed to tobacco smoke. He has never used smokeless tobacco. I have educated him on the risk of diseases from using tobacco products such as cancer, COPD, and heart disease.     I advised him to quit and he is not willing to quit.    I spent  1  minutes counseling the patient.     Ht 172.7 cm (68\")   Wt 119 kg (262 lb)   Breastfeeding No   BMI 39.84 kg/m²     PHYSICAL EXAM  Physical Exam   Constitutional: He is oriented to person, place, and time. He appears well-developed.   HENT:   Head: Normocephalic and atraumatic.   Nose: Nose normal.   Eyes: Lids are normal. Right eye exhibits no discharge. Left eye exhibits no discharge. Right conjunctiva is not injected. Left conjunctiva is not injected.   Pulmonary/Chest:  No respiratory distress.  Neurological: He is alert and oriented to person, place, and time. No cranial nerve deficit.   Skin:   Umbilicus erythematous, deep   Psychiatric: He has a normal mood and affect. His speech is normal and behavior is normal. Judgment and thought content normal.       Results for orders placed or performed in visit on 04/25/24 "   Comprehensive Metabolic Panel    Specimen: Blood   Result Value Ref Range    Glucose 114 (H) 65 - 99 mg/dL    BUN 12 6 - 20 mg/dL    Creatinine 1.08 (H) 0.57 - 1.00 mg/dL    EGFR Result 70.6 >60.0 mL/min/1.73    BUN/Creatinine Ratio 11.1 7.0 - 25.0    Sodium 137 136 - 145 mmol/L    Potassium 4.1 3.5 - 5.2 mmol/L    Chloride 101 98 - 107 mmol/L    Total CO2 25.6 22.0 - 29.0 mmol/L    Calcium 9.4 8.6 - 10.5 mg/dL    Total Protein 6.6 6.0 - 8.5 g/dL    Albumin 4.2 3.5 - 5.2 g/dL    Globulin 2.4 gm/dL    A/G Ratio 1.8 g/dL    Total Bilirubin 0.2 0.0 - 1.2 mg/dL    Alkaline Phosphatase 86 39 - 117 U/L    AST (SGOT) 22 1 - 32 U/L    ALT (SGPT) 33 1 - 33 U/L   Hemoglobin A1c    Specimen: Blood   Result Value Ref Range    Hemoglobin A1C 5.80 (H) 4.80 - 5.60 %       Diagnoses and all orders for this visit:    1. Bronchitis (Primary)    2. Skin infection    Other orders  -     predniSONE (DELTASONE) 20 MG tablet; Prednisone 20mg tabs, 3 for 2 days, 2 for 2 days, 1 for 2 days, 1/2 for 2 days take with food or milk  Dispense: 13 tablet; Refill: 0  -     brompheniramine-pseudoephedrine-DM 30-2-10 MG/5ML syrup; Take 5 mL by mouth 4 (Four) Times a Day As Needed for Cough.  Dispense: 118 mL; Refill: 0  -     mupirocin (BACTROBAN) 2 % ointment; Apply 1 Application topically to the appropriate area as directed 2 (Two) Times a Day for 7 days.  Dispense: 14 g; Refill: 0    Take prednisone with food as early in the day as possible  Do not take prednisone with nsaids such as ibuprofen, aleve, or aspirin  May take tylenol for pain or fever  Bromfed as needed for cough, congestion, allergies  Wash umbilicus with mild soap and water and apply mupirocin ointment Twice dialy    FOLLOW-UP  If symptoms worsen or persist follow up with PCP, Virtual Care or Urgent Care    Patient verbalizes understanding of medication dosage, comfort measures, instructions for treatment and follow-up.    Erlinda Lucas, RAHUL  06/26/2024  09:43 EDT    The use of  a video visit has been reviewed with the patient and verbal informed consent has been obtained. Myself and Costa Watson participated in this visit. The patient is located in [address] [place] [address].    I am located in [place], [place]. [employer] and [employer] were utilized. I spent 26 minutes in the patient's chart for this visit.

## 2024-06-30 ENCOUNTER — TELEMEDICINE (OUTPATIENT)
Dept: FAMILY MEDICINE CLINIC | Facility: TELEHEALTH | Age: 34
End: 2024-06-30
Payer: COMMERCIAL

## 2024-06-30 DIAGNOSIS — J22 LOWER RESPIRATORY INFECTION (E.G., BRONCHITIS, PNEUMONIA, PNEUMONITIS, PULMONITIS): Primary | ICD-10-CM

## 2024-06-30 RX ORDER — BENZONATATE 100 MG/1
100 CAPSULE ORAL 3 TIMES DAILY PRN
Qty: 21 CAPSULE | Refills: 0 | Status: SHIPPED | OUTPATIENT
Start: 2024-06-30 | End: 2024-07-07

## 2024-06-30 RX ORDER — AZITHROMYCIN 250 MG/1
TABLET, FILM COATED ORAL
Qty: 6 TABLET | Refills: 0 | Status: SHIPPED | OUTPATIENT
Start: 2024-06-30 | End: 2024-06-30 | Stop reason: SDUPTHER

## 2024-06-30 RX ORDER — BENZONATATE 100 MG/1
100 CAPSULE ORAL 3 TIMES DAILY PRN
Qty: 21 CAPSULE | Refills: 0 | Status: SHIPPED | OUTPATIENT
Start: 2024-06-30 | End: 2024-06-30 | Stop reason: SDUPTHER

## 2024-06-30 RX ORDER — AZITHROMYCIN 250 MG/1
TABLET, FILM COATED ORAL
Qty: 6 TABLET | Refills: 0 | Status: SHIPPED | OUTPATIENT
Start: 2024-06-30

## 2024-06-30 NOTE — PROGRESS NOTES
You have chosen to receive care through a telehealth visit.  Do you consent to use a video/audio connection for your medical care today? Yes     CHIEF COMPLAINT  Chief Complaint   Patient presents with    Cough         HPI  Costa Watsno is a 33 y.o. adult  presents with complaint of congested, cough. Reports symptoms for 6 days. Reports his ears are popping. Reports coughing up brown and yellow stuff. Reports right side of chest is crackling before he coughs and clear it. Reports no fever or chills. No nausea or vomiting. Mild SOA. No chest pain. Reports his allergies have been acting up for a month. Reports took steroid and cough med that didn't help.     Review of Systems   Constitutional:  Negative for chills, fatigue and fever.   HENT:  Positive for congestion, ear pain and sinus pressure. Negative for ear discharge, sinus pain and sore throat.    Respiratory:  Positive for cough and shortness of breath (mild). Negative for chest tightness and wheezing.    Cardiovascular:  Negative for chest pain.   Gastrointestinal:  Negative for abdominal pain, diarrhea, nausea and vomiting.   Musculoskeletal:  Negative for back pain and myalgias.   Neurological:  Negative for dizziness and headaches.   Psychiatric/Behavioral: Negative.         Past Medical History:   Diagnosis Date    Acid reflux     ADHD (attention deficit hyperactivity disorder)     Allergic     Pollen/mold/cats    Anxiety     Arthritis     C. difficile colitis     Cyst, sinus nasal     Depression     Diverticulosis     Fibromyalgia     Hilar mass     histoplasmosis    Histoplasmosis     Hx of gastroesophageal reflux (GERD)     Hyperlipidemia     Low back pain     NSTEMI (non-ST elevated myocardial infarction) 09/09/2018    related to histoplasmosis    Obesity     Palpitations     PCOS (polycystic ovarian syndrome)     Schizoaffective disorder        Family History   Problem Relation Age of Onset    Diabetes Father     Arthritis Father     Alzheimer's  disease Mother     Stroke Mother     Osteoporosis Maternal Grandmother     Heart attack Maternal Grandmother         unknown    Heart disease Maternal Grandmother         unknown    Heart attack Maternal Grandfather         unknown    Heart disease Maternal Grandfather         unknown    Breast cancer Paternal Aunt     Prostate cancer Paternal Uncle     Ovarian cancer Neg Hx     Colon cancer Neg Hx     Uterine cancer Neg Hx        Social History     Socioeconomic History    Marital status: Single   Tobacco Use    Smoking status: Every Day     Current packs/day: 1.00     Average packs/day: 1 pack/day for 20.0 years (20.0 ttl pk-yrs)     Types: Cigarettes     Passive exposure: Never    Smokeless tobacco: Never    Tobacco comments:     He is aware of consequences of smoking.    Vaping Use    Vaping status: Former   Substance and Sexual Activity    Alcohol use: No    Drug use: No    Sexual activity: Yes     Partners: Female     Birth control/protection: None       Costa Watson  reports that he has been smoking cigarettes. He has a 20 pack-year smoking history. He has never been exposed to tobacco smoke. He has never used smokeless tobacco. I have educated him on the risk of diseases from using tobacco products such as cancer, COPD, and heart disease.     I advised him to quit and he is not willing to quit.    I spent  1  minutes counseling the patient.              Breastfeeding No     PHYSICAL EXAM  Physical Exam   Constitutional: He is oriented to person, place, and time. He appears well-developed and well-nourished. No distress.   HENT:   Head: Normocephalic and atraumatic.   Right Ear: Hearing normal.   Left Ear: Hearing normal.   Nose: Congestion present. Right sinus exhibits maxillary sinus tenderness. Left sinus exhibits maxillary sinus tenderness.   Mouth/Throat: Mouth/Lips are normal.  Patient directed exam  Mild sinus pressure    Eyes: Conjunctivae and lids are normal.   Pulmonary/Chest: Effort normal.  No  respiratory distress.  Abdominal: There is no abdominal tenderness.   Neurological: He is alert and oriented to person, place, and time.   Psychiatric: He has a normal mood and affect. His speech is normal and behavior is normal.       Results for orders placed or performed in visit on 04/25/24   Comprehensive Metabolic Panel    Specimen: Blood   Result Value Ref Range    Glucose 114 (H) 65 - 99 mg/dL    BUN 12 6 - 20 mg/dL    Creatinine 1.08 (H) 0.57 - 1.00 mg/dL    EGFR Result 70.6 >60.0 mL/min/1.73    BUN/Creatinine Ratio 11.1 7.0 - 25.0    Sodium 137 136 - 145 mmol/L    Potassium 4.1 3.5 - 5.2 mmol/L    Chloride 101 98 - 107 mmol/L    Total CO2 25.6 22.0 - 29.0 mmol/L    Calcium 9.4 8.6 - 10.5 mg/dL    Total Protein 6.6 6.0 - 8.5 g/dL    Albumin 4.2 3.5 - 5.2 g/dL    Globulin 2.4 gm/dL    A/G Ratio 1.8 g/dL    Total Bilirubin 0.2 0.0 - 1.2 mg/dL    Alkaline Phosphatase 86 39 - 117 U/L    AST (SGOT) 22 1 - 32 U/L    ALT (SGPT) 33 1 - 33 U/L   Hemoglobin A1c    Specimen: Blood   Result Value Ref Range    Hemoglobin A1C 5.80 (H) 4.80 - 5.60 %       Diagnoses and all orders for this visit:    1. Lower respiratory infection (e.g., bronchitis, pneumonia, pneumonitis, pulmonitis) (Primary)    Other orders  -     azithromycin (Zithromax Z-Anselmo) 250 MG tablet; Take 2 tablets by mouth on day 1, then 1 tablet daily on days 2-5  Dispense: 6 tablet; Refill: 0  -     benzonatate (Tessalon Perles) 100 MG capsule; Take 1 capsule by mouth 3 (Three) Times a Day As Needed for Cough for up to 7 days.  Dispense: 21 capsule; Refill: 0    Rest  Fluids  Flonase OTC   PCP if symptoms persist   ER for any worsening symptoms such as high fever, chest pain or SOA       FOLLOW-UP  As discussed during visit with PCP/[facility] if no improvement or Urgent Care/Emergency Department if worsening of symptoms    Patient verbalizes understanding of medication dosage, comfort measures, instructions for treatment and follow-up.    Laura Aguilar  RAHUL Roach  06/30/2024  19:22 EDT    The use of a video visit has been reviewed with the patient and verbal informed consent has been obtained. Myself and Costa Watson participated in this visit. The patient is located in [address] [place] [address].    I am located in [place], [place]. My[employer] and [employer] were utilized. I spent 5 minutes in the patient's chart for this visit.      Note Disclaimer: At [facility], we believe that sharing information builds trust and better   relationships. You are receiving this note because you recently visited [facility]. It is possible you   will see health information before a provider has talked with you about it. This kind of information can   be easy to misunderstand. To help you fully understand what it means for your health, we urge you to   discuss this note with your provider.

## 2024-07-10 ENCOUNTER — OFFICE VISIT (OUTPATIENT)
Dept: FAMILY MEDICINE CLINIC | Facility: CLINIC | Age: 34
End: 2024-07-10
Payer: COMMERCIAL

## 2024-07-10 VITALS
SYSTOLIC BLOOD PRESSURE: 116 MMHG | HEART RATE: 91 BPM | WEIGHT: 252.9 LBS | BODY MASS INDEX: 38.33 KG/M2 | DIASTOLIC BLOOD PRESSURE: 76 MMHG | OXYGEN SATURATION: 93 % | HEIGHT: 68 IN | RESPIRATION RATE: 20 BRPM

## 2024-07-10 DIAGNOSIS — J45.21 MILD INTERMITTENT ASTHMA WITH ACUTE EXACERBATION: Primary | ICD-10-CM

## 2024-07-10 DIAGNOSIS — E34.9 HORMONE IMBALANCE: ICD-10-CM

## 2024-07-10 DIAGNOSIS — J30.1 SEASONAL ALLERGIC RHINITIS DUE TO POLLEN: ICD-10-CM

## 2024-07-10 DIAGNOSIS — Z51.81 THERAPEUTIC DRUG MONITORING: ICD-10-CM

## 2024-07-10 DIAGNOSIS — F64.0 GENDER DYSPHORIA IN ADULT: ICD-10-CM

## 2024-07-10 PROCEDURE — 1160F RVW MEDS BY RX/DR IN RCRD: CPT | Performed by: FAMILY MEDICINE

## 2024-07-10 PROCEDURE — 99214 OFFICE O/P EST MOD 30 MIN: CPT | Performed by: FAMILY MEDICINE

## 2024-07-10 PROCEDURE — 1159F MED LIST DOCD IN RCRD: CPT | Performed by: FAMILY MEDICINE

## 2024-07-10 PROCEDURE — 1125F AMNT PAIN NOTED PAIN PRSNT: CPT | Performed by: FAMILY MEDICINE

## 2024-07-10 RX ORDER — ALBUTEROL SULFATE 90 UG/1
2 AEROSOL, METERED RESPIRATORY (INHALATION) EVERY 4 HOURS PRN
COMMUNITY

## 2024-07-10 RX ORDER — FLUTICASONE PROPIONATE 50 MCG
2 SPRAY, SUSPENSION (ML) NASAL DAILY
Qty: 18.2 G | Refills: 5 | Status: SHIPPED | OUTPATIENT
Start: 2024-07-10

## 2024-07-10 NOTE — PROGRESS NOTES
"Chief Complaint  Gender dysphoria in adult     Subjective    History of Present Illness    Costa Watson presents to [facility] for Gender dysphoria in adult .  History of Present Illness     Here today for general follow-up as above. Was also in the hospital recently with a new diagnosis of asthma with acute exacerbation. Was treated with steroids and an albuterol inhaler. Seems to be doing much better today, has decreased need for the albuterol inhaler. Has an ongoing productive cough at times though no fevers or chills. Has only occasional wheezing. Has chronic allergic rhinitis but is not taking any allergy medications at present. Was also told that he likely had a viral URI that caused some of his asthma exacerbation.    Due for follow-up on gender affirming hormone therapy. Continues on testosterone as prescribed. Getting Aveed every 10 weeks. Is about half way until his next dose, due for check of testosterone today. Reports good tolerance, no unwanted side effects.     Objective     Vital Signs:   /76   Pulse 91   Resp 20   Ht 172.7 cm (68\")   Wt 115 kg (252 lb 14.4 oz)   SpO2 93%   BMI 38.45 kg/m²   Physical Exam  Vitals and nursing note reviewed.   Constitutional:       General: He is not in acute distress.     Appearance: Normal appearance. He is not ill-appearing.   Cardiovascular:      Rate and Rhythm: Normal rate and regular rhythm.      Pulses: Normal pulses.      Heart sounds: Normal heart sounds. No murmur heard.  Pulmonary:      Effort: Pulmonary effort is normal. No respiratory distress.      Breath sounds: Examination of the right-upper field reveals rhonchi. Examination of the left-upper field reveals rhonchi. Examination of the right-lower field reveals wheezing. Examination of the left-lower field reveals wheezing. Wheezing present. No rales.   Neurological:      Mental Status: He is alert and oriented to person, place, and time. Mental status is at " baseline.   Psychiatric:         Mood and Affect: Mood normal.         Behavior: Behavior normal.                 Assessment and Plan   Diagnoses and all orders for this visit:    1. Mild intermittent asthma with acute exacerbation (Primary)  -     fluticasone (FLONASE) 50 MCG/ACT nasal spray; 2 sprays into the nostril(s) as directed by provider Daily.  Dispense: 18.2 g; Refill: 5    2. Seasonal allergic rhinitis due to pollen  -     fluticasone (FLONASE) 50 MCG/ACT nasal spray; 2 sprays into the nostril(s) as directed by provider Daily.  Dispense: 18.2 g; Refill: 5    3. Gender dysphoria in adult  -     Testosterone    4. Hormone imbalance  -     Testosterone    5. Therapeutic drug monitoring  -     Testosterone    Orders as above. Will add some fluticasone to his current regimen in hopes that controlling his allergies helps with asthma as well. Discussed next steps in asthma management and signs and symptoms that would warrant more aggressive care. He will keep me updated with his progress. Recommended close follow-up in the short-term. I will contact him with testosterone results as available.    Recommended follow up as below. Encouraged communication via [employer] in the meantime.     Patient was given instructions and counseling regarding his condition or for health maintenance advice. Please see specific information pulled into the AVS (placed there by myself) if appropriate.    Return in about 2 months (around 9/10/2024), or if symptoms worsen or fail to improve, for Follow-up asthma.    ALEX Alvarez MD

## 2024-07-11 LAB — TESTOST SERPL-MCNC: 155 NG/DL (ref 8–60)

## 2024-08-01 DIAGNOSIS — R73.03 PREDIABETES: Primary | ICD-10-CM

## 2024-08-01 RX ORDER — BLOOD-GLUCOSE METER
1 KIT MISCELLANEOUS DAILY
Qty: 1 EACH | Refills: 1 | Status: SHIPPED | OUTPATIENT
Start: 2024-08-01

## 2024-08-01 RX ORDER — LANCETS
1 EACH MISCELLANEOUS DAILY
Qty: 90 EACH | Refills: 1 | Status: SHIPPED | OUTPATIENT
Start: 2024-08-01 | End: 2025-07-27

## 2024-08-05 DIAGNOSIS — R73.03 PREDIABETES: ICD-10-CM

## 2024-08-12 ENCOUNTER — CLINICAL SUPPORT (OUTPATIENT)
Dept: FAMILY MEDICINE CLINIC | Facility: CLINIC | Age: 34
End: 2024-08-12
Payer: COMMERCIAL

## 2024-08-12 DIAGNOSIS — F64.0 GENDER DYSPHORIA IN ADULT: Primary | ICD-10-CM

## 2024-08-12 DIAGNOSIS — E34.9 HORMONE IMBALANCE: ICD-10-CM

## 2024-08-12 PROCEDURE — 96372 THER/PROPH/DIAG INJ SC/IM: CPT | Performed by: FAMILY MEDICINE

## 2024-08-12 NOTE — PROGRESS NOTES
Patient presented to the office today for another Aveed injection. Patient tolerated injection well. Patient will present in 10 weeks for repeat injection.

## 2024-09-26 DIAGNOSIS — F64.0 GENDER DYSPHORIA IN ADULT: ICD-10-CM

## 2024-09-26 DIAGNOSIS — E34.9 HORMONE IMBALANCE: ICD-10-CM

## 2024-09-27 RX ORDER — TESTOSTERONE UNDECANOATE 250 MG/ML
INJECTION INTRAMUSCULAR
Qty: 3 ML | Refills: 1 | Status: SHIPPED | OUTPATIENT
Start: 2024-09-27

## 2024-11-01 ENCOUNTER — PATIENT ROUNDING (BHMG ONLY) (OUTPATIENT)
Dept: FAMILY MEDICINE CLINIC | Facility: CLINIC | Age: 34
End: 2024-11-01
Payer: MEDICAID

## 2024-11-01 ENCOUNTER — OFFICE VISIT (OUTPATIENT)
Dept: FAMILY MEDICINE CLINIC | Facility: CLINIC | Age: 34
End: 2024-11-01
Payer: MEDICAID

## 2024-11-01 VITALS
DIASTOLIC BLOOD PRESSURE: 96 MMHG | TEMPERATURE: 98.1 F | HEART RATE: 87 BPM | SYSTOLIC BLOOD PRESSURE: 141 MMHG | RESPIRATION RATE: 20 BRPM | BODY MASS INDEX: 44.28 KG/M2 | WEIGHT: 292.2 LBS | HEIGHT: 68 IN | OXYGEN SATURATION: 95 %

## 2024-11-01 DIAGNOSIS — R20.2 BILATERAL NUMBNESS AND TINGLING OF ARMS AND LEGS: ICD-10-CM

## 2024-11-01 DIAGNOSIS — Z76.89 ENCOUNTER TO ESTABLISH CARE: ICD-10-CM

## 2024-11-01 DIAGNOSIS — Z13.29 SCREENING FOR ENDOCRINE, METABOLIC AND IMMUNITY DISORDER: ICD-10-CM

## 2024-11-01 DIAGNOSIS — Z13.220 SCREENING FOR LIPID DISORDERS: ICD-10-CM

## 2024-11-01 DIAGNOSIS — Z13.0 SCREENING FOR ENDOCRINE, METABOLIC AND IMMUNITY DISORDER: ICD-10-CM

## 2024-11-01 DIAGNOSIS — R73.03 PREDIABETES: ICD-10-CM

## 2024-11-01 DIAGNOSIS — E34.9 HORMONE IMBALANCE: ICD-10-CM

## 2024-11-01 DIAGNOSIS — Z13.228 SCREENING FOR ENDOCRINE, METABOLIC AND IMMUNITY DISORDER: ICD-10-CM

## 2024-11-01 DIAGNOSIS — Z13.29 SCREENING FOR THYROID DISORDER: ICD-10-CM

## 2024-11-01 DIAGNOSIS — Z13.1 SCREENING FOR DIABETES MELLITUS: ICD-10-CM

## 2024-11-01 DIAGNOSIS — J45.21 MILD INTERMITTENT ASTHMA WITH ACUTE EXACERBATION: ICD-10-CM

## 2024-11-01 DIAGNOSIS — F64.0 GENDER DYSPHORIA IN ADULT: Primary | ICD-10-CM

## 2024-11-01 DIAGNOSIS — R20.0 BILATERAL NUMBNESS AND TINGLING OF ARMS AND LEGS: ICD-10-CM

## 2024-11-01 DIAGNOSIS — J34.1 NASAL SINUS CYST: ICD-10-CM

## 2024-11-01 DIAGNOSIS — L08.9 RECURRENT INFECTION OF SKIN: ICD-10-CM

## 2024-11-01 DIAGNOSIS — E66.01 MORBID (SEVERE) OBESITY DUE TO EXCESS CALORIES: ICD-10-CM

## 2024-11-01 PROCEDURE — T1015 CLINIC SERVICE: HCPCS | Performed by: REGISTERED NURSE

## 2024-11-01 PROCEDURE — 1159F MED LIST DOCD IN RCRD: CPT | Performed by: REGISTERED NURSE

## 2024-11-01 PROCEDURE — 1125F AMNT PAIN NOTED PAIN PRSNT: CPT | Performed by: REGISTERED NURSE

## 2024-11-01 PROCEDURE — 99215 OFFICE O/P EST HI 40 MIN: CPT | Performed by: REGISTERED NURSE

## 2024-11-01 PROCEDURE — 1160F RVW MEDS BY RX/DR IN RCRD: CPT | Performed by: REGISTERED NURSE

## 2024-11-01 RX ORDER — SEMAGLUTIDE 0.25 MG/.5ML
0.25 INJECTION, SOLUTION SUBCUTANEOUS WEEKLY
Qty: 2 ML | Refills: 2 | Status: SHIPPED | OUTPATIENT
Start: 2024-11-01

## 2024-11-01 RX ORDER — TESTOSTERONE UNDECANOATE 250 MG/ML
INJECTION INTRAMUSCULAR
Qty: 3 ML | Refills: 1 | Status: SHIPPED | OUTPATIENT
Start: 2024-11-01

## 2024-11-01 RX ORDER — SULFAMETHOXAZOLE AND TRIMETHOPRIM 800; 160 MG/1; MG/1
1 TABLET ORAL 2 TIMES DAILY
Qty: 20 TABLET | Refills: 0 | Status: SHIPPED | OUTPATIENT
Start: 2024-11-01 | End: 2024-11-11

## 2024-11-01 NOTE — PROGRESS NOTES
November 1, 2024    Hello, may I speak with Costa Watson?    My name is SD      I am  with [facility]  [address] [facility] [place] [address].    Before we get started may I verify your date of birth? 1990    I am calling to officially welcome you to our practice and ask about your recent visit. Is this a good time to talk? yes    Tell me about your visit with us. What things went well?  PATIENT STATES VISIT WAS GREAT VERY FRIENDLY PERSON AND STAFF       We're always looking for ways to make our patients' experiences even better. Do you have recommendations on ways we may improve?  no    Overall were you satisfied with your first visit to our practice? yes       I appreciate you taking the time to speak with me today. Is there anything else I can do for you? no      Thank you, and have a great day.

## 2024-11-01 NOTE — PROGRESS NOTES
Chief Complaint  Establish Care and Asthma    Subjective    History of Present Illness {CC  Problem List  Visit  Diagnosis   Encounters  Notes  Medications  Labs  Result Review Imaging  Media :23}     Costa Watson presents to [facility] for Establish Care and Asthma.      History of Present Illness  Patient is a 34 y.o. male who presents to the clinic today for establishment of care with a chronic history of gender dysphoria, prediabetes, chronic asthma, recurrent skin infection of abdomen, and with concerns of history of nasal sinus cyst greater than 3 months and weight gain x 3 months.  Patient denies any chest pain, shortness of breath, or any fevers.  Patient denies any known exposure to COVID, flu, or any other contagious illnesses.       History of Present Illness  The patient presents for evaluation of multiple medical concerns.    In regards to gender dysphoria, he is due for his testosterone injection, having been on this treatment for approximately 1.5 years.  He reports persistent fatigue, often falling asleep around 8:00 PM, and suspects he may have narcolepsy.  He has previously been tested for sleep apnea.    In regards to prediabetes, he has noticed that his blood sugar levels are high before meals and decrease after eating.  His last hemoglobin A1c was in the prediabetic range at 5.8 in April 2024.  I would recommend him getting another hemoglobin A1c tomorrow or in the near future to see where current glucose levels are ranging.    In regards to chronic asthma, patient is currently taking albuterol as needed to manage this.  He denies frequent use of albuterol but likes to keep it on hand if needed.  Patient does not take a daily medication to prevent asthma attacks due to the infrequency of having a need.  This is something we can discuss in the future if needed.    In regards to recurrent skin infection, he has a chronic infection around his navel he  shares, which is currently infected.  He has been using a cream prescribed by Dr. Alvarez, but it does not seem to be effective. The infection recurs approximately every other week.  We discussed getting a round of Bactrim and Bactroban ointment to help treat this.  If this is not successful I would highly recommend we culture the area and make sure the approach to treatment is appropriate.  We will go the cost effective route first and try a round of oral antibiotics as well as topical antibiotic cream.    In regards to chronic nasal cyst, He is seeking a referral to an ENT specialist due to self-reported historical cyst in his sinuses.  He was informed by an ENT specialist in [place] that his sinuses were completely filled, he shares.  He also experiences tinnitus, or ringing in his ears for several years now.  I have requested we get these records from previous ENT to update on patient's chart.    In regards to weight gain, he has noticed a rapid weight gain, with his weight increasing from 250 pounds a few months ago to 292 today.  He is interested in exploring the possibility of using Ozempic for weight loss.  We discussed that patient does not qualify for Ozempic because he is not diabetic but Wegovy, which is approved for nondiabetic patients, is an acceptable option.  We discussed risk versus benefit of Wegovy.  Patient would like to move forward with Wegovy at this time.    Patient also shares that he has been experiencing constant pain and numbness in his bilateral hands, which has been worsening over the past few months. The swelling in his legs has improved.  He experiences numbness and tingling in both hands, which has been ongoing for at least a year. Additionally, his fingers feel colder than usual he shares.  Patient is interested in seeing a neurosurgeon to discuss what could be causing this and for further investigation.  Referral has been placed.    He is considering a hysterectomy and is  "seeking a referral to an OB/GYN specialist.  Referral will be placed today.    He has never received an influenza vaccine and does not believe he needs one as she has never contracted the flu.       Review of Systems   Constitutional: Negative.  Negative for activity change, chills, fatigue and fever.   HENT: Negative.  Negative for congestion, dental problem, ear pain, hearing loss, rhinorrhea, sinus pain, sore throat, tinnitus and trouble swallowing.    Eyes: Negative.  Negative for pain and visual disturbance.   Respiratory: Negative.  Negative for cough, chest tightness, shortness of breath and wheezing.    Cardiovascular: Negative.  Negative for chest pain, palpitations and leg swelling.   Gastrointestinal: Negative.  Negative for abdominal pain, diarrhea, nausea and vomiting.   Endocrine: Negative.  Negative for polydipsia, polyphagia and polyuria.   Genitourinary: Negative.  Negative for difficulty urinating, dysuria, frequency and urgency.   Musculoskeletal: Negative.  Negative for arthralgias, back pain and myalgias.   Skin: Negative.  Negative for color change, pallor, rash and wound.   Allergic/Immunologic: Negative.  Negative for environmental allergies.   Neurological:  Positive for numbness. Negative for dizziness, speech difficulty, weakness, light-headedness and headaches.   Hematological: Negative.    Psychiatric/Behavioral: Negative.  Negative for confusion, decreased concentration, self-injury and suicidal ideas. The patient is not nervous/anxious.    All other systems reviewed and are negative.       Objective     Vital Signs:   /96 (BP Location: Left arm, Patient Position: Sitting, Cuff Size: Large Adult)   Pulse 87   Temp 98.1 °F (36.7 °C) (Oral)   Resp 20   Ht 172.7 cm (68\")   Wt 133 kg (292 lb 3.2 oz)   SpO2 95%   BMI 44.43 kg/m²   Current Outpatient Medications on File Prior to Visit   Medication Sig Dispense Refill    albuterol sulfate  (90 Base) MCG/ACT inhaler Inhale 2 " puffs Every 4 (Four) Hours As Needed for Wheezing.      atomoxetine (STRATTERA) 40 MG capsule Take 1 capsule by mouth Daily.      busPIRone (BUSPAR) 7.5 MG tablet Take 1 tablet by mouth 2 (Two) Times a Day.      estradiol (Vagifem) 10 MCG tablet vaginal tablet Insert 1 tablet into vagina nightly for 2 weeks and then twice weekly 24 tablet 3    fluticasone (FLONASE) 50 MCG/ACT nasal spray 2 sprays into the nostril(s) as directed by provider Daily. 18.2 g 5    glucose blood test strip 1 each by Other route 2 (Two) Times a Day As Needed (blood sugar monitoring). 90 each 1    glucose monitor monitoring kit Use 1 each Daily. 1 each 1    ibuprofen (ADVIL,MOTRIN) 600 MG tablet Take 1 tablet by mouth Every 8 (Eight) Hours As Needed for Moderate Pain. 90 tablet 1    Lancets Thin misc Use 1 Units Daily for 360 days. 90 each 1    Lybalvi 10-10 MG tablet       sertraline (ZOLOFT) 100 MG tablet Take 2 tablets by mouth Daily.      ondansetron ODT (ZOFRAN-ODT) 4 MG disintegrating tablet Place 1 tablet on the tongue Every 8 (Eight) Hours As Needed for Nausea or Vomiting. (Patient not taking: Reported on 11/1/2024) 30 tablet 0    triamcinolone (KENALOG) 0.1 % ointment Apply 1 Application topically to the appropriate area as directed 2 (Two) Times a Day. (Patient not taking: Reported on 11/1/2024) 30 g 1     No current facility-administered medications on file prior to visit.        Past Medical History:   Diagnosis Date    Acid reflux     ADHD (attention deficit hyperactivity disorder)     Allergic     Pollen/mold/cats    Anxiety     Arthritis     C. difficile colitis     Cyst, sinus nasal     Depression     Diverticulosis     Fibromyalgia     Hilar mass     histoplasmosis    Histoplasmosis     Hx of gastroesophageal reflux (GERD)     Hyperlipidemia     Low back pain     NSTEMI (non-ST elevated myocardial infarction) 09/09/2018    related to histoplasmosis    Obesity     Palpitations     PCOS (polycystic ovarian syndrome)      Schizoaffective disorder       Past Surgical History:   Procedure Laterality Date    CARDIAC CATHETERIZATION      CHOLECYSTECTOMY      laparoscopic    MEDIASTINOSCOPY        Family History   Problem Relation Age of Onset    Diabetes Father     Arthritis Father     Alzheimer's disease Mother     Stroke Mother     Osteoporosis Maternal Grandmother     Heart attack Maternal Grandmother         unknown    Heart disease Maternal Grandmother         unknown    Heart attack Maternal Grandfather         unknown    Heart disease Maternal Grandfather         unknown    Breast cancer Paternal Aunt     Prostate cancer Paternal Uncle     Ovarian cancer Neg Hx     Colon cancer Neg Hx     Uterine cancer Neg Hx       Social History     Socioeconomic History    Marital status: Single   Tobacco Use    Smoking status: Every Day     Current packs/day: 1.00     Average packs/day: 1 pack/day for 20.0 years (20.0 ttl pk-yrs)     Types: Cigarettes     Passive exposure: Never    Smokeless tobacco: Never    Tobacco comments:     He is aware of consequences of smoking.    Vaping Use    Vaping status: Former    Substances: Nicotine    Devices: Pre-filled or refillable cartridge    Passive vaping exposure: Yes   Substance and Sexual Activity    Alcohol use: No    Drug use: No    Sexual activity: Yes     Partners: Female     Birth control/protection: None         No visits with results within 3 Month(s) from this visit.   Latest known visit with results is:   Office Visit on 07/10/2024   Component Date Value Ref Range Status    Testosterone, Total 07/10/2024 155 (H)  8 - 60 ng/dL Final         Physical Exam  Vitals and nursing note reviewed.   Constitutional:       Appearance: Normal appearance. He is normal weight.   HENT:      Head: Normocephalic and atraumatic.   Cardiovascular:      Rate and Rhythm: Normal rate and regular rhythm.      Pulses: Normal pulses.      Heart sounds: Normal heart sounds. No murmur heard.     No friction rub. No  gallop.   Pulmonary:      Effort: Pulmonary effort is normal. No respiratory distress.      Breath sounds: Normal breath sounds. No stridor. No wheezing, rhonchi or rales.   Chest:      Chest wall: No tenderness.   Abdominal:      General: Abdomen is flat. Bowel sounds are normal. There is no distension.      Palpations: Abdomen is soft. There is no mass.      Tenderness: There is no abdominal tenderness. There is no right CVA tenderness, left CVA tenderness, guarding or rebound.      Hernia: No hernia is present.   Skin:     General: Skin is warm and dry.      Capillary Refill: Capillary refill takes less than 2 seconds.      Coloration: Skin is not jaundiced or pale.   Neurological:      General: No focal deficit present.      Mental Status: He is alert and oriented to person, place, and time. Mental status is at baseline.      Motor: No weakness.      Coordination: Coordination normal.      Gait: Gait normal.   Psychiatric:         Mood and Affect: Mood normal.         Behavior: Behavior normal.         Thought Content: Thought content normal.         Judgment: Judgment normal.          Physical Exam         Result Review  Data Reviewed:{ Labs  Result Review  Imaging  Med Tab  Media :23}   I have reviewed this patient's chart.  I have reviewed previous labs, previous imaging, previous medications, and previous encounters with notes that were available in this patient's chart.    Results                Assessment and Plan {CC Problem List  Visit Diagnosis  ROS  Review (Popup)  [facility]  Quality  BestPractice  Medications  SmartSets  SnapShot Encounters  Media :23}   Diagnoses and all orders for this visit:    1. Gender dysphoria in adult (Primary)  -     Testosterone Undecanoate (Aveed) 750 MG/3ML solution; Inject 3mL into the muscle every 10 weeks  Dispense: 3 mL; Refill: 1  -     Testosterone (Free & Total), LC / MS; Future  -     Ambulatory Referral to Gynecology; Future    2. Hormone  imbalance  -     Testosterone Undecanoate (Aveed) 750 MG/3ML solution; Inject 3mL into the muscle every 10 weeks  Dispense: 3 mL; Refill: 1    3. Bilateral numbness and tingling of arms and legs  -     Ambulatory Referral to Neurosurgery    4. Nasal sinus cyst  -     Ambulatory Referral to ENT (Otolaryngology)    5. Recurrent infection of skin  -     sulfamethoxazole-trimethoprim (Bactrim DS) 800-160 MG per tablet; Take 1 tablet by mouth 2 (Two) Times a Day for 10 days.  Dispense: 20 tablet; Refill: 0    6. Morbid (severe) obesity due to excess calories  -     Semaglutide-Weight Management (Wegovy) 0.25 MG/0.5ML solution auto-injector; Inject 0.5 mL under the skin into the appropriate area as directed 1 (One) Time Per Week.  Dispense: 2 mL; Refill: 2    7. Body mass index (BMI) of 40.0 to 44.9 in adult  -     Semaglutide-Weight Management (Wegovy) 0.25 MG/0.5ML solution auto-injector; Inject 0.5 mL under the skin into the appropriate area as directed 1 (One) Time Per Week.  Dispense: 2 mL; Refill: 2    8. Prediabetes  -     Hemoglobin A1c; Future    9. Mild intermittent asthma with acute exacerbation    10. Screening for endocrine, metabolic and immunity disorder  -     CBC & Differential; Future  -     Comprehensive Metabolic Panel; Future    11. Screening for diabetes mellitus  -     Hemoglobin A1c; Future    12. Screening for lipid disorders  -     Lipid Panel; Future    13. Screening for thyroid disorder  -     TSH; Future    14. Encounter to establish care        Assessment & Plan  1.  Gender Dysphoria.  Patient would like to continue on current treatment with testosterone replacement. A prescription for Aveed will be sent to [facility].  He is advised to contact the office if any issues arise with the pharmacy or if prior authorization is required. Labs will be rechecked in approximately 6 weeks to monitor testosterone levels. A referral to an OB/GYN specialist will be made to discuss potential  hysterectomy options. The patient is advised to [facility] to find a provider who is experienced in transgender care.  And we can send the referral to whoever they choose.    2. History of sinus cysts.  A referral to an ENT specialist will be made to evaluate and manage the sinus cysts. The patient reports a history of ringing in the ears and previous discussions about potential surgery.  I have requested that patient sign a release of information paper so that we can get these records for his chart.    3. Bilateral hand numbness and tingling.  A referral to a neurosurgeon will be made to evaluate the bilateral hand numbness and tingling, which has been ongoing for at least a year. The patient reports that the numbness and tingling are accompanied by pain and occur in both hands.    4. Prediabetes.  Routine A1c checks and monitoring of carbohydrate intake are recommended. The patient reports fluctuations in blood glucose levels, with higher readings before meals and lower readings after eating.  He is advised to continue monitoring her blood glucose levels and to maintain a low-carbohydrate diet to manage prediabetes.    5. Chronic umbilical infection.  A round of Bactrim will be prescribed, and he is advised to continue using the Bactroban ointment. If the infection persists, a culture swab will be taken to rule out other potential causes. The patient reports recurrent infections every other week, which I suspect may be related to MRSA.    6. Weight management.  The patient expresses concern about rapid weight gain. A prescription for Wegovy will be sent to the pharmacy, pending insurance approval. If insurance does not cover Wegovy, alternative options at a compound pharmacy will be considered. The goal of the medication is to suppress appetite and reduce cravings for high-fat and high-carb foods.    -Sign paper for release of information so doing get previous ENT notes for chart.  -ER red  flags discussed with patient including risk versus benefit and education provided.  -Labs in 6 weeks to recheck testosterone level.  -Follow-up with me in 3 months or sooner if needed.    I spent 40 minutes caring for Costa on this date of service. This time includes time spent by me in the following activities:preparing for the visit, reviewing tests, obtaining and/or reviewing a separately obtained history, performing a medically appropriate examination and/or evaluation , counseling and educating the patient/family/caregiver, ordering medications, tests, or procedures, referring and communicating with other health care professionals , documenting information in the medical record, independently interpreting results and communicating that information with the patient/family/caregiver, and care coordination.    Follow Up {Instructions Charge Capture  Follow-up Communications :23}     Patient was given instructions and counseling regarding his condition or for health maintenance advice. Please see specific information pulled into the AVS (placed there by myself) if appropriate.    Return in about 3 months (around 2/1/2025) for routine follow up.    Class 2 Severe Obesity (BMI >=35 and <=39.9). Obesity-related health conditions include the following: impaired fasting glucose. Obesity is unchanged. BMI is is above average; BMI management plan is completed. We discussed portion control and increasing exercise.         RAHUL Ayoub, [facility]-BC      Patient or patient representative verbalized consent for the use of Ambient Listening during the visit with  RAHUL Ayoub for chart documentation. 11/4/2024  12:51 EST

## 2024-11-04 ENCOUNTER — PRIOR AUTHORIZATION (OUTPATIENT)
Dept: FAMILY MEDICINE CLINIC | Facility: CLINIC | Age: 34
End: 2024-11-04
Payer: MEDICAID

## 2024-11-05 ENCOUNTER — TELEPHONE (OUTPATIENT)
Dept: FAMILY MEDICINE CLINIC | Facility: CLINIC | Age: 34
End: 2024-11-05

## 2024-11-05 NOTE — TELEPHONE ENCOUNTER
HUB IS INSTRUCTED TO RELAY BELOW MESSAGE     IF PT CALLS BACK HE WILL HAVE TO CALL [facility] AND CORRECT HIS ADDRESS WITH THEM    [phone]

## 2024-11-05 NOTE — TELEPHONE ENCOUNTER
Pharmacy Name: [facility] - [place], [place] - [address] [facility] [phone] [facility] [phone] FX     Reference Number (if applicable): [id number]    Pharmacy representative name: JACQUELINE    Pharmacy representative phone number: [phone]    What medication are you calling in regards to: Testosterone Undecanoate (Aveed) 750 MG/3ML solution     What question does the pharmacy have: PHARMACY IS NEEDING TO CLARIFY PATIENT ADDRESS.     PHARMACY STATES THAT KYM GERARD IS NOT FOUND IN THE REMS PORTAL AND NEITHER IS THE PRACTICE ADDRESS.     PLEASE CONTACT TO DISCUSS.     Who is the provider that prescribed the medication: KYM GERARD    Additional notes: PLEASE CALL TO DISCUSS QUESTIONS ABOVE.

## 2024-11-06 ENCOUNTER — TELEPHONE (OUTPATIENT)
Dept: FAMILY MEDICINE CLINIC | Facility: CLINIC | Age: 34
End: 2024-11-06

## 2024-11-06 NOTE — TELEPHONE ENCOUNTER
Caller: [facility] - [place], [place] - [address] [facility] - [phone]  - [phone] FX    Relationship to patient: Pharmacy    Patient is needing: [facility] IS CALLING ABOUT THE AVEED STATING THAT THE PATIENT'S ADDRESS FOR THE PATIENT ON THE REMS PORTAL DOES NOT MATCH WHAT IS ON THE PRESCRIPTION.     SHE STATES THAT THIS MEANS THAT THEY NEED THE PROVIDER TO BE CERTIFIED TO PRESCRIBE THE AVEED MEDICATION.      [facility] STATES THAT DANNI WOULD CALL  WHICH IS THE AVEED REMS CALL CENTER NUMBER TO GET CERTIFIED AND UPDATE THE PATIENT'S ADDRESS.

## 2024-11-12 ENCOUNTER — TELEMEDICINE (OUTPATIENT)
Dept: FAMILY MEDICINE CLINIC | Facility: TELEHEALTH | Age: 34
End: 2024-11-12
Payer: MEDICAID

## 2024-11-12 DIAGNOSIS — B34.9 VIRAL ILLNESS: Primary | ICD-10-CM

## 2024-11-12 PROCEDURE — 99213 OFFICE O/P EST LOW 20 MIN: CPT | Performed by: NURSE PRACTITIONER

## 2024-11-12 PROCEDURE — 1159F MED LIST DOCD IN RCRD: CPT | Performed by: NURSE PRACTITIONER

## 2024-11-12 PROCEDURE — 1160F RVW MEDS BY RX/DR IN RCRD: CPT | Performed by: NURSE PRACTITIONER

## 2024-11-12 RX ORDER — BROMPHENIRAMINE MALEATE, PSEUDOEPHEDRINE HYDROCHLORIDE, AND DEXTROMETHORPHAN HYDROBROMIDE 2; 30; 10 MG/5ML; MG/5ML; MG/5ML
5 SYRUP ORAL 4 TIMES DAILY PRN
Qty: 120 ML | Refills: 0 | Status: SHIPPED | OUTPATIENT
Start: 2024-11-12

## 2024-11-12 RX ORDER — PREDNISONE 20 MG/1
20 TABLET ORAL 2 TIMES DAILY
Qty: 10 TABLET | Refills: 0 | Status: SHIPPED | OUTPATIENT
Start: 2024-11-12 | End: 2024-11-17

## 2024-11-12 RX ORDER — ONDANSETRON 8 MG/1
8 TABLET, ORALLY DISINTEGRATING ORAL EVERY 8 HOURS PRN
Qty: 15 TABLET | Refills: 0 | Status: SHIPPED | OUTPATIENT
Start: 2024-11-12

## 2024-11-12 NOTE — PROGRESS NOTES
MIKEL Watson is a 34 y.o. adult  presents with complaint of cough, thick yellow mucus, diarrhea, fatigue, chills, nausea for the last 3-4 days. Unknown temperature. Tested negative for covid. No known sick contacts. Has not taken any meds.     Review of Systems    Past Medical History:   Diagnosis Date    Acid reflux     ADHD (attention deficit hyperactivity disorder)     Allergic     Pollen/mold/cats    Anxiety     Arthritis     C. difficile colitis     Cyst, sinus nasal     Depression     Diverticulosis     Fibromyalgia     Hilar mass     histoplasmosis    Histoplasmosis     Hx of gastroesophageal reflux (GERD)     Hyperlipidemia     Low back pain     NSTEMI (non-ST elevated myocardial infarction) 09/09/2018    related to histoplasmosis    Obesity     Palpitations     PCOS (polycystic ovarian syndrome)     Schizoaffective disorder        Family History   Problem Relation Age of Onset    Diabetes Father     Arthritis Father     Alzheimer's disease Mother     Stroke Mother     Osteoporosis Maternal Grandmother     Heart attack Maternal Grandmother         unknown    Heart disease Maternal Grandmother         unknown    Heart attack Maternal Grandfather         unknown    Heart disease Maternal Grandfather         unknown    Breast cancer Paternal Aunt     Prostate cancer Paternal Uncle     Ovarian cancer Neg Hx     Colon cancer Neg Hx     Uterine cancer Neg Hx        Social History     Socioeconomic History    Marital status: Single   Tobacco Use    Smoking status: Every Day     Current packs/day: 1.00     Average packs/day: 1 pack/day for 20.0 years (20.0 ttl pk-yrs)     Types: Cigarettes     Passive exposure: Never    Smokeless tobacco: Never    Tobacco comments:     He is aware of consequences of smoking.    Vaping Use    Vaping status: Former    Substances: Nicotine    Devices: Pre-filled or refillable cartridge    Passive vaping exposure: Yes   Substance and Sexual Activity    Alcohol use: No    Drug use:  No    Sexual activity: Yes     Partners: Female     Birth control/protection: None         There were no vitals taken for this visit.    PHYSICAL EXAM  Physical Exam   Constitutional: He appears well-developed and well-nourished.   HENT:   Head: Normocephalic.   Nose: Nose normal.   Neck: Neck normal appearance.  Pulmonary/Chest: Effort normal.   Neurological: He is alert.   Psychiatric: He has a normal mood and affect. His speech is normal.       Diagnoses and all orders for this visit:    1. Viral illness (Primary)  -     ondansetron ODT (ZOFRAN-ODT) 8 MG disintegrating tablet; Place 1 tablet on the tongue Every 8 (Eight) Hours As Needed for Nausea or Vomiting.  Dispense: 15 tablet; Refill: 0  -     brompheniramine-pseudoephedrine-DM 30-2-10 MG/5ML syrup; Take 5 mL by mouth 4 (Four) Times a Day As Needed for Cough or Allergies.  Dispense: 120 mL; Refill: 0  -     predniSONE (DELTASONE) 20 MG tablet; Take 1 tablet by mouth 2 (Two) Times a Day for 5 days.  Dispense: 10 tablet; Refill: 0          FOLLOW-UP  As discussed during visit with [facility], if symptoms worsen or fail to improve, follow-up with PCP/Urgent Care/Emergency Department.    Patient verbalizes understanding of medications, instructions for treatment and follow-up.    Zaira Whitmore, APRN  11/12/2024  14:59 EST      Mode of Visit: Video  Location of patient: -HOME-  Location of provider: Home  You have chosen to receive care through a telehealth visit.  The patient has signed the video visit consent form.  The visit included audio and video interaction. No technical issues occurred during this visit.

## 2024-11-19 ENCOUNTER — TELEPHONE (OUTPATIENT)
Dept: FAMILY MEDICINE CLINIC | Facility: CLINIC | Age: 34
End: 2024-11-19
Payer: MEDICAID

## 2024-11-19 NOTE — TELEPHONE ENCOUNTER
VINNY IS INSTRUCTED TO RELAY BELOW MESSAGE FROM ERICK      IF PT CALLS BACK I am sorry for the delay.  I am still working on trying to get approved as an Aveed prescriber and to get our clinic approved for this.  As soon as I have received approval or have any clarification I will happily reach out the patient. Sorry for the delay with this.    Kind regards,  Erick

## 2024-11-19 NOTE — TELEPHONE ENCOUNTER
Caller: Costa Watson    Relationship: Self    Best call back number: [phone]     What is the best time to reach you: ANY    Who are you requesting to speak with (clinical staff, provider,  specific staff member): CLINICAL    Do you know the name of the person who called: COSTA    What was the call regarding:   PATIENT IS WANTING AN UPDATE ON GETTING THIS MEDICINE DELIVERED

## 2024-11-25 ENCOUNTER — OFFICE VISIT (OUTPATIENT)
Dept: FAMILY MEDICINE CLINIC | Facility: CLINIC | Age: 34
End: 2024-11-25
Payer: MEDICAID

## 2024-11-25 VITALS
HEART RATE: 90 BPM | HEIGHT: 68 IN | TEMPERATURE: 97.7 F | OXYGEN SATURATION: 97 % | SYSTOLIC BLOOD PRESSURE: 124 MMHG | DIASTOLIC BLOOD PRESSURE: 80 MMHG | WEIGHT: 277.4 LBS | RESPIRATION RATE: 18 BRPM | BODY MASS INDEX: 42.04 KG/M2

## 2024-11-25 DIAGNOSIS — K21.9 GASTROESOPHAGEAL REFLUX DISEASE WITHOUT ESOPHAGITIS: Primary | ICD-10-CM

## 2024-11-25 PROCEDURE — 1125F AMNT PAIN NOTED PAIN PRSNT: CPT

## 2024-11-25 PROCEDURE — T1015 CLINIC SERVICE: HCPCS

## 2024-11-25 PROCEDURE — 99213 OFFICE O/P EST LOW 20 MIN: CPT

## 2024-11-25 PROCEDURE — 1160F RVW MEDS BY RX/DR IN RCRD: CPT

## 2024-11-25 PROCEDURE — 1159F MED LIST DOCD IN RCRD: CPT

## 2024-11-25 RX ORDER — OMEPRAZOLE 40 MG/1
40 CAPSULE, DELAYED RELEASE ORAL DAILY
Qty: 30 CAPSULE | Refills: 2 | Status: SHIPPED | OUTPATIENT
Start: 2024-11-25

## 2024-11-25 NOTE — PROGRESS NOTES
"Chief Complaint  Heartburn (Onset 1 week ago./Nothing helping. Has taken OTC tums)    Subjective    History of Present Illness {CC  Problem List  Visit  Diagnosis   Encounters  Notes  Medications  Labs  Result Review Imaging  Media :23}     Costa Watson presents to [facility] for Heartburn (Onset 1 week ago./Nothing helping. Has taken OTC tums).      History of Present Illness     34 YOM presents with chief complaint of acid reflux. Patient says for the past 1-2 weeks he has noticed a significant increase in acid reflux symptoms. Patient describes it as a burning discomfort in the epigastrium and also notices the reflux in his throat as well. Patient says no certain foods seem to cause the symptoms. He says he has had intermittent acid reflux for years but it has never been bad like this. He reports associated nausea but no vomiting. He has been taking Tums with minimal relief. Patient denies significant abdominal pain. No coughing or spitting up blood. No fever, chills, diarrhea, chest pain, palpitations, or SOB.         Objective     Vital Signs:   /80   Pulse 90   Temp 97.7 °F (36.5 °C) (Temporal)   Resp 18   Ht 172.7 cm (68\")   Wt 126 kg (277 lb 6.4 oz)   SpO2 97%   BMI 42.18 kg/m²   Current Outpatient Medications on File Prior to Visit   Medication Sig Dispense Refill    albuterol sulfate  (90 Base) MCG/ACT inhaler Inhale 2 puffs Every 4 (Four) Hours As Needed for Wheezing.      atomoxetine (STRATTERA) 40 MG capsule Take 1 capsule by mouth Daily.      busPIRone (BUSPAR) 7.5 MG tablet Take 1 tablet by mouth 2 (Two) Times a Day.      estradiol (Vagifem) 10 MCG tablet vaginal tablet Insert 1 tablet into vagina nightly for 2 weeks and then twice weekly 24 tablet 3    fluticasone (FLONASE) 50 MCG/ACT nasal spray 2 sprays into the nostril(s) as directed by provider Daily. 18.2 g 5    glucose blood test strip 1 each by Other route 2 (Two) Times a Day As " Needed (blood sugar monitoring). 90 each 1    glucose monitor monitoring kit Use 1 each Daily. 1 each 1    ibuprofen (ADVIL,MOTRIN) 600 MG tablet Take 1 tablet by mouth Every 8 (Eight) Hours As Needed for Moderate Pain. 90 tablet 1    Lancets Thin misc Use 1 Units Daily for 360 days. 90 each 1    Lybalvi 10-10 MG tablet       ondansetron ODT (ZOFRAN-ODT) 8 MG disintegrating tablet Place 1 tablet on the tongue Every 8 (Eight) Hours As Needed for Nausea or Vomiting. 15 tablet 0    sertraline (ZOLOFT) 100 MG tablet Take 2 tablets by mouth Daily.      Testosterone Undecanoate (Aveed) 750 MG/3ML solution Inject 3mL into the muscle every 10 weeks 3 mL 1    triamcinolone (KENALOG) 0.1 % ointment Apply 1 Application topically to the appropriate area as directed 2 (Two) Times a Day. 30 g 1    brompheniramine-pseudoephedrine-DM 30-2-10 MG/5ML syrup Take 5 mL by mouth 4 (Four) Times a Day As Needed for Cough or Allergies. (Patient not taking: Reported on 11/25/2024) 120 mL 0     No current facility-administered medications on file prior to visit.        Past Medical History:   Diagnosis Date    Acid reflux     ADHD (attention deficit hyperactivity disorder)     Allergic     Pollen/mold/cats    Anxiety     Arthritis     Asthma     C. difficile colitis     Cyst, sinus nasal     Depression     Diverticulosis     Fibromyalgia     Hilar mass     histoplasmosis    Histoplasmosis     Hx of gastroesophageal reflux (GERD)     Hyperlipidemia     Low back pain     NSTEMI (non-ST elevated myocardial infarction) 09/09/2018    related to histoplasmosis    Obesity     Palpitations     PCOS (polycystic ovarian syndrome)     Schizoaffective disorder       Past Surgical History:   Procedure Laterality Date    CARDIAC CATHETERIZATION      CHOLECYSTECTOMY      laparoscopic    MEDIASTINOSCOPY        Family History   Problem Relation Age of Onset    Diabetes Father     Arthritis Father     Alzheimer's disease Mother     Stroke Mother      Osteoporosis Maternal Grandmother     Heart attack Maternal Grandmother         unknown    Heart disease Maternal Grandmother         unknown    Heart attack Maternal Grandfather         unknown    Heart disease Maternal Grandfather         unknown    Breast cancer Paternal Aunt     Prostate cancer Paternal Uncle     Heart disease Paternal Grandfather     Heart disease Paternal Grandmother     Ovarian cancer Neg Hx     Colon cancer Neg Hx     Uterine cancer Neg Hx       Social History     Socioeconomic History    Marital status: Single   Tobacco Use    Smoking status: Every Day     Current packs/day: 1.00     Average packs/day: 1 pack/day for 20.0 years (20.0 ttl pk-yrs)     Types: Cigarettes     Passive exposure: Never    Smokeless tobacco: Never    Tobacco comments:     He is aware of consequences of smoking.    Vaping Use    Vaping status: Former    Substances: Nicotine    Devices: Pre-filled or refillable cartridge    Passive vaping exposure: Yes   Substance and Sexual Activity    Alcohol use: No    Drug use: No    Sexual activity: Yes     Partners: Female     Birth control/protection: None         No visits with results within 3 Month(s) from this visit.   Latest known visit with results is:   Office Visit on 07/10/2024   Component Date Value Ref Range Status    Testosterone, Total 07/10/2024 155 (H)  8 - 60 ng/dL Final         Physical Exam  Constitutional:       Appearance: Normal appearance.   HENT:      Head: Normocephalic and atraumatic.   Eyes:      General: No scleral icterus.     Extraocular Movements: Extraocular movements intact.   Cardiovascular:      Rate and Rhythm: Normal rate and regular rhythm.      Pulses: Normal pulses.      Heart sounds: Normal heart sounds. No murmur heard.     No friction rub. No gallop.   Pulmonary:      Effort: Pulmonary effort is normal.      Breath sounds: Normal breath sounds. No wheezing, rhonchi or rales.   Abdominal:      General: Abdomen is flat. Bowel sounds are  normal.      Palpations: Abdomen is soft.      Tenderness: There is no abdominal tenderness. There is no right CVA tenderness or left CVA tenderness.   Musculoskeletal:      Cervical back: Neck supple.   Skin:     General: Skin is warm and dry.   Neurological:      Mental Status: He is alert. Mental status is at baseline.      Coordination: Coordination normal.      Gait: Gait normal.   Psychiatric:         Mood and Affect: Mood normal.         Behavior: Behavior normal.         Thought Content: Thought content normal.         Judgment: Judgment normal.          Result Review  Data Reviewed:{ Labs  Result Review  Imaging  Med Tab  Media :23}   I have reviewed this patient's chart.  I have reviewed previous labs, previous imaging, previous medications, and previous encounters with notes that were available in this patient's chart.               Assessment and Plan {CC Problem List  Visit Diagnosis  ROS  Review (Popup)  Health Maintenance  Quality  BestPractice  Medications  SmartSets  SnapShot Encounters  Media :23}      Gastroesophageal reflux disease without esophagitis    Orders:    omeprazole (priLOSEC) 40 MG capsule; Take 1 capsule by mouth Daily.    Recommend try Pepcid AC BID before meals and if that does not work can proceed with Omeprazole 40 mg once daily. Discussed risks and benefits of the medication. Advised if ongoing symptoms or develops pain will need to evaluate further with gastroenterology. Discussed recommendation to only be on PPI for short term if possible due to possible long term consequences of taking medication.        -  -ER red flags discussed with patient including risk versus benefit and education provided.  -Follow-up with me      Follow Up {Instructions Charge Capture  Follow-up Communications :23}     Patient was given instructions and counseling regarding his condition or for health maintenance advice. Please see specific information pulled into the AVS (placed  there by myself) if appropriate.    Return in about 1 week (around 12/2/2024) for 1 week for labs before 10AM.      Sharron Greer PA-C

## 2024-12-03 ENCOUNTER — CLINICAL SUPPORT (OUTPATIENT)
Dept: FAMILY MEDICINE CLINIC | Facility: CLINIC | Age: 34
End: 2024-12-03
Payer: MEDICAID

## 2024-12-03 DIAGNOSIS — Z13.220 SCREENING FOR LIPID DISORDERS: ICD-10-CM

## 2024-12-03 DIAGNOSIS — R73.03 PREDIABETES: ICD-10-CM

## 2024-12-03 DIAGNOSIS — Z13.1 SCREENING FOR DIABETES MELLITUS: ICD-10-CM

## 2024-12-03 DIAGNOSIS — Z13.0 SCREENING FOR ENDOCRINE, METABOLIC AND IMMUNITY DISORDER: ICD-10-CM

## 2024-12-03 DIAGNOSIS — Z13.228 SCREENING FOR ENDOCRINE, METABOLIC AND IMMUNITY DISORDER: ICD-10-CM

## 2024-12-03 DIAGNOSIS — F64.0 GENDER DYSPHORIA IN ADULT: ICD-10-CM

## 2024-12-03 DIAGNOSIS — Z13.29 SCREENING FOR ENDOCRINE, METABOLIC AND IMMUNITY DISORDER: ICD-10-CM

## 2024-12-03 DIAGNOSIS — Z13.29 SCREENING FOR THYROID DISORDER: ICD-10-CM

## 2024-12-03 LAB — HBA1C MFR BLD: 5.8 % (ref 4.8–5.6)

## 2024-12-03 PROCEDURE — 83036 HEMOGLOBIN GLYCOSYLATED A1C: CPT | Performed by: REGISTERED NURSE

## 2024-12-03 PROCEDURE — 84402 ASSAY OF FREE TESTOSTERONE: CPT | Performed by: REGISTERED NURSE

## 2024-12-03 PROCEDURE — 80050 GENERAL HEALTH PANEL: CPT | Performed by: REGISTERED NURSE

## 2024-12-03 PROCEDURE — 36415 COLL VENOUS BLD VENIPUNCTURE: CPT | Performed by: REGISTERED NURSE

## 2024-12-03 PROCEDURE — 84403 ASSAY OF TOTAL TESTOSTERONE: CPT | Performed by: REGISTERED NURSE

## 2024-12-03 PROCEDURE — 80061 LIPID PANEL: CPT | Performed by: REGISTERED NURSE

## 2024-12-03 NOTE — PROGRESS NOTES
Venipuncture Blood Specimen Collection  Venipuncture performed in RT ARM by Andrew Lozoya with good hemostasis. Patient tolerated the procedure well without complications.   12/03/24   Andrew Lozoya

## 2024-12-04 LAB
ALBUMIN SERPL-MCNC: 4.4 G/DL (ref 3.5–5.2)
ALBUMIN/GLOB SERPL: 1.8 G/DL
ALP SERPL-CCNC: 102 U/L (ref 39–117)
ALT SERPL W P-5'-P-CCNC: 61 U/L (ref 1–33)
ANION GAP SERPL CALCULATED.3IONS-SCNC: 12 MMOL/L (ref 5–15)
AST SERPL-CCNC: 31 U/L (ref 1–32)
BASOPHILS # BLD AUTO: 0.06 10*3/MM3 (ref 0–0.2)
BASOPHILS NFR BLD AUTO: 0.8 % (ref 0–1.5)
BILIRUB SERPL-MCNC: 0.4 MG/DL (ref 0–1.2)
BUN SERPL-MCNC: 10 MG/DL (ref 6–20)
BUN/CREAT SERPL: 9.4 (ref 7–25)
CALCIUM SPEC-SCNC: 9.5 MG/DL (ref 8.6–10.5)
CHLORIDE SERPL-SCNC: 104 MMOL/L (ref 98–107)
CHOLEST SERPL-MCNC: 209 MG/DL (ref 0–200)
CO2 SERPL-SCNC: 22 MMOL/L (ref 22–29)
CREAT SERPL-MCNC: 1.06 MG/DL (ref 0.57–1)
DEPRECATED RDW RBC AUTO: 40 FL (ref 37–54)
EGFRCR SERPLBLD CKD-EPI 2021: 71.7 ML/MIN/1.73
EOSINOPHIL # BLD AUTO: 0.24 10*3/MM3 (ref 0–0.4)
EOSINOPHIL NFR BLD AUTO: 3.3 % (ref 0.3–6.2)
ERYTHROCYTE [DISTWIDTH] IN BLOOD BY AUTOMATED COUNT: 12.3 % (ref 12.3–15.4)
GLOBULIN UR ELPH-MCNC: 2.5 GM/DL
GLUCOSE SERPL-MCNC: 99 MG/DL (ref 65–99)
HCT VFR BLD AUTO: 52.6 % (ref 34–46.6)
HDLC SERPL-MCNC: 33 MG/DL (ref 40–60)
HGB BLD-MCNC: 18.3 G/DL (ref 12–15.9)
IMM GRANULOCYTES # BLD AUTO: 0.02 10*3/MM3 (ref 0–0.05)
IMM GRANULOCYTES NFR BLD AUTO: 0.3 % (ref 0–0.5)
LDLC SERPL CALC-MCNC: 141 MG/DL (ref 0–100)
LDLC/HDLC SERPL: 4.15 {RATIO}
LYMPHOCYTES # BLD AUTO: 2.19 10*3/MM3 (ref 0.7–3.1)
LYMPHOCYTES NFR BLD AUTO: 30.2 % (ref 19.6–45.3)
MCH RBC QN AUTO: 31 PG (ref 26.6–33)
MCHC RBC AUTO-ENTMCNC: 34.8 G/DL (ref 31.5–35.7)
MCV RBC AUTO: 89.2 FL (ref 79–97)
MONOCYTES # BLD AUTO: 0.38 10*3/MM3 (ref 0.1–0.9)
MONOCYTES NFR BLD AUTO: 5.2 % (ref 5–12)
NEUTROPHILS NFR BLD AUTO: 4.36 10*3/MM3 (ref 1.7–7)
NEUTROPHILS NFR BLD AUTO: 60.2 % (ref 42.7–76)
NRBC BLD AUTO-RTO: 0 /100 WBC (ref 0–0.2)
PLATELET # BLD AUTO: 274 10*3/MM3 (ref 140–450)
PMV BLD AUTO: 11.6 FL (ref 6–12)
POTASSIUM SERPL-SCNC: 4.8 MMOL/L (ref 3.5–5.2)
PROT SERPL-MCNC: 6.9 G/DL (ref 6–8.5)
RBC # BLD AUTO: 5.9 10*6/MM3 (ref 3.77–5.28)
SODIUM SERPL-SCNC: 138 MMOL/L (ref 136–145)
TRIGL SERPL-MCNC: 195 MG/DL (ref 0–150)
TSH SERPL DL<=0.05 MIU/L-ACNC: 3.25 UIU/ML (ref 0.27–4.2)
VLDLC SERPL-MCNC: 35 MG/DL (ref 5–40)
WBC NRBC COR # BLD AUTO: 7.25 10*3/MM3 (ref 3.4–10.8)

## 2024-12-10 LAB
TESTOST FREE SERPL-MCNC: 9.2 PG/ML (ref 0–4.2)
TESTOST SERPL-MCNC: 269.5 NG/DL (ref 10–55)

## 2024-12-11 DIAGNOSIS — E34.9 HORMONE IMBALANCE: Primary | ICD-10-CM

## 2024-12-11 DIAGNOSIS — F64.0 GENDER DYSPHORIA IN ADULT: ICD-10-CM

## 2024-12-11 RX ORDER — TESTOSTERONE CYPIONATE 200 MG/ML
200 INJECTION, SOLUTION INTRAMUSCULAR
Qty: 2 ML | Refills: 1 | Status: SHIPPED | OUTPATIENT
Start: 2024-12-11

## 2024-12-12 ENCOUNTER — PRIOR AUTHORIZATION (OUTPATIENT)
Dept: FAMILY MEDICINE CLINIC | Facility: CLINIC | Age: 34
End: 2024-12-12
Payer: MEDICAID

## 2024-12-13 ENCOUNTER — OFFICE VISIT (OUTPATIENT)
Dept: FAMILY MEDICINE CLINIC | Facility: CLINIC | Age: 34
End: 2024-12-13
Payer: MEDICAID

## 2024-12-13 VITALS
SYSTOLIC BLOOD PRESSURE: 139 MMHG | DIASTOLIC BLOOD PRESSURE: 108 MMHG | BODY MASS INDEX: 42.5 KG/M2 | HEART RATE: 105 BPM | RESPIRATION RATE: 18 BRPM | WEIGHT: 280.4 LBS | HEIGHT: 68 IN | OXYGEN SATURATION: 99 %

## 2024-12-13 DIAGNOSIS — J45.21 MILD INTERMITTENT ASTHMA WITH ACUTE EXACERBATION: ICD-10-CM

## 2024-12-13 DIAGNOSIS — J34.1 MAXILLARY SINUS CYST: ICD-10-CM

## 2024-12-13 DIAGNOSIS — F64.0 GENDER DYSPHORIA IN ADULT: ICD-10-CM

## 2024-12-13 DIAGNOSIS — E34.9 HORMONE IMBALANCE: ICD-10-CM

## 2024-12-13 DIAGNOSIS — L08.9 SKIN INFECTION: Primary | ICD-10-CM

## 2024-12-13 PROCEDURE — 87015 SPECIMEN INFECT AGNT CONCNTJ: CPT | Performed by: REGISTERED NURSE

## 2024-12-13 PROCEDURE — 99214 OFFICE O/P EST MOD 30 MIN: CPT | Performed by: REGISTERED NURSE

## 2024-12-13 PROCEDURE — 87205 SMEAR GRAM STAIN: CPT | Performed by: REGISTERED NURSE

## 2024-12-13 PROCEDURE — 1125F AMNT PAIN NOTED PAIN PRSNT: CPT | Performed by: REGISTERED NURSE

## 2024-12-13 PROCEDURE — 87070 CULTURE OTHR SPECIMN AEROBIC: CPT | Performed by: REGISTERED NURSE

## 2024-12-13 PROCEDURE — 1159F MED LIST DOCD IN RCRD: CPT | Performed by: REGISTERED NURSE

## 2024-12-13 PROCEDURE — 87075 CULTR BACTERIA EXCEPT BLOOD: CPT | Performed by: REGISTERED NURSE

## 2024-12-13 PROCEDURE — T1015 CLINIC SERVICE: HCPCS | Performed by: REGISTERED NURSE

## 2024-12-13 PROCEDURE — 1160F RVW MEDS BY RX/DR IN RCRD: CPT | Performed by: REGISTERED NURSE

## 2024-12-13 RX ORDER — SYRINGE W-NEEDLE,DISPOSAB,3 ML 25GX5/8"
1 SYRINGE, EMPTY DISPOSABLE MISCELLANEOUS WEEKLY
Qty: 50 EACH | Refills: 1 | Status: SHIPPED | OUTPATIENT
Start: 2024-12-13

## 2024-12-13 RX ORDER — XANOMELINE AND TROSPIUM CHLORIDE 50-100-20
KIT ORAL
Status: CANCELLED
Start: 2024-12-13

## 2024-12-13 RX ORDER — ALBUTEROL SULFATE 90 UG/1
2 INHALANT RESPIRATORY (INHALATION) EVERY 4 HOURS PRN
Qty: 18 G | Refills: 2 | Status: SHIPPED | OUTPATIENT
Start: 2024-12-13

## 2024-12-13 NOTE — PROGRESS NOTES
Chief Complaint  Naval (Patient is having some itching and drainage from naval, patient states it has been going on for several years , it comes and goes and also states it has an odor. )    Subjective    History of Present Illness {CC  Problem List  Visit  Diagnosis   Encounters  Notes  Medications  Labs  Result Review Imaging  Media :23}     Costa Watson presents to [facility] for Naval (Patient is having some itching and drainage from naval, patient states it has been going on for several years , it comes and goes and also states it has an odor. ).      History of Present Illness  Patient is a 34 y.o. transgender male who presents to the clinic today for concerns of chronic intermittent skin infection around navel area greater than 5 years, maxillary cyst x 5 years, and newly diagnosed paranoid schizophrenia.  Patient denies any chest pain, shortness of breath, or any fevers.  Patient denies any known exposure to COVID, flu, or any other contagious illnesses.       History of Present Illness  In regards to skin infection of umbilical area, he has been experiencing intermittent drainage and pruritus from his umbilical region for several years, occasionally accompanied by spontaneous bleeding. He is seeking to identify the underlying cause and implement preventive measures. Additionally, he reports the presence of small, itchy papules on his back. He has a history of staphylococcal infections, which have been managed with mupirocin ointment.  Patient would like to move forward with a wound culture to further rule out cause.  He shares that in the past he is taken mupirocin ointment to treat this.  Patient reports having some of this at home and will begin to use it again.    In regards to nasal/maxillary sinus cyst, he has a known cyst in his sinuses, which is causing him to experience headaches every other day. He was previously evaluated by an ENT specialist in 2018 or  2019, who recommended surgical removal of the cyst patient shares. However, he did not proceed with the surgery at that time. He is now requesting a referral to an ENT specialist for further evaluation and management of the cyst.  He shares that it now impedes his breathing and is causing headaches frequently.    According to the patient, he has been diagnosed with paranoid schizophrenia and has recently started treatment with Cobenvy. He is currently on the first day of this medication and has been provided with a sample by his psychiatrist. He reports that his blood pressure is elevated, which he attributes to the stress of being in the clinic and dealing with his umbilical issues.  Patient is advised to recheck blood pressure at home and if it remains above 150/80 to reach out to clinic and let me know.  This is not historically been a concern so patient will monitor at this time.    MEDICATIONS  Current: Cobenvy       Review of Systems   Constitutional: Negative.  Negative for activity change, chills, diaphoresis, fatigue and fever.   HENT: Negative.  Negative for congestion, dental problem, ear pain, hearing loss, rhinorrhea, sinus pain, sore throat, tinnitus and trouble swallowing.    Eyes: Negative.  Negative for pain and visual disturbance.   Respiratory: Negative.  Negative for cough, chest tightness, shortness of breath and wheezing.    Cardiovascular: Negative.  Negative for chest pain, palpitations and leg swelling.   Gastrointestinal: Negative.  Negative for abdominal pain, diarrhea, nausea and vomiting.   Endocrine: Negative.  Negative for polydipsia, polyphagia and polyuria.   Genitourinary: Negative.  Negative for difficulty urinating, dysuria, frequency and urgency.   Musculoskeletal: Negative.  Negative for arthralgias, back pain, myalgias and neck pain.   Skin:  Positive for wound (Umbilical skin infection). Negative for color change, pallor and rash.   Allergic/Immunologic: Negative.  Negative  "for environmental allergies.   Neurological: Negative.  Negative for dizziness, speech difficulty, weakness, light-headedness, numbness and headaches.   Hematological: Negative.    Psychiatric/Behavioral: Negative.  Negative for confusion, decreased concentration, self-injury and suicidal ideas. The patient is not nervous/anxious.    All other systems reviewed and are negative.       Objective     Vital Signs:   BP (!) 139/108 (BP Location: Left arm, Patient Position: Sitting, Cuff Size: Large Adult)   Pulse 105   Resp 18   Ht 172.7 cm (68\")   Wt 127 kg (280 lb 6.4 oz)   SpO2 99%   BMI 42.63 kg/m²   Current Outpatient Medications on File Prior to Visit   Medication Sig Dispense Refill    atomoxetine (STRATTERA) 40 MG capsule Take 1 capsule by mouth Daily.      brompheniramine-pseudoephedrine-DM 30-2-10 MG/5ML syrup Take 5 mL by mouth 4 (Four) Times a Day As Needed for Cough or Allergies. (Patient not taking: Reported on 11/25/2024) 120 mL 0    busPIRone (BUSPAR) 7.5 MG tablet Take 1 tablet by mouth 2 (Two) Times a Day.      estradiol (Vagifem) 10 MCG tablet vaginal tablet Insert 1 tablet into vagina nightly for 2 weeks and then twice weekly 24 tablet 3    fluticasone (FLONASE) 50 MCG/ACT nasal spray 2 sprays into the nostril(s) as directed by provider Daily. 18.2 g 5    glucose blood test strip 1 each by Other route 2 (Two) Times a Day As Needed (blood sugar monitoring). 90 each 1    glucose monitor monitoring kit Use 1 each Daily. 1 each 1    ibuprofen (ADVIL,MOTRIN) 600 MG tablet Take 1 tablet by mouth Every 8 (Eight) Hours As Needed for Moderate Pain. 90 tablet 1    Lancets Thin misc Use 1 Units Daily for 360 days. 90 each 1    Lybalvi 10-10 MG tablet       omeprazole (priLOSEC) 40 MG capsule Take 1 capsule by mouth Daily. 30 capsule 2    ondansetron ODT (ZOFRAN-ODT) 8 MG disintegrating tablet Place 1 tablet on the tongue Every 8 (Eight) Hours As Needed for Nausea or Vomiting. 15 tablet 0    sertraline " (ZOLOFT) 100 MG tablet Take 2 tablets by mouth Daily.      Testosterone Cypionate (Depo-Testosterone) 200 MG/ML injection Inject 1 mL into the appropriate muscle as directed by prescriber Every 14 (Fourteen) Days. 2 mL 1    Testosterone Undecanoate (Aveed) 750 MG/3ML solution Inject 3mL into the muscle every 10 weeks 3 mL 1    triamcinolone (KENALOG) 0.1 % ointment Apply 1 Application topically to the appropriate area as directed 2 (Two) Times a Day. 30 g 1    [DISCONTINUED] albuterol sulfate  (90 Base) MCG/ACT inhaler Inhale 2 puffs Every 4 (Four) Hours As Needed for Wheezing.       No current facility-administered medications on file prior to visit.        Past Medical History:   Diagnosis Date    Acid reflux     ADHD (attention deficit hyperactivity disorder)     Allergic     Pollen/mold/cats    Anxiety     Arthritis     Asthma     C. difficile colitis     Cyst, sinus nasal     Depression     Diverticulosis     Fibromyalgia     Hilar mass     histoplasmosis    Histoplasmosis     Hx of gastroesophageal reflux (GERD)     Hyperlipidemia     Low back pain     NSTEMI (non-ST elevated myocardial infarction) 09/09/2018    related to histoplasmosis    Obesity     Palpitations     PCOS (polycystic ovarian syndrome)     Schizoaffective disorder       Past Surgical History:   Procedure Laterality Date    CARDIAC CATHETERIZATION      CHOLECYSTECTOMY      laparoscopic    MEDIASTINOSCOPY        Family History   Problem Relation Age of Onset    Diabetes Father     Arthritis Father     Alzheimer's disease Mother     Stroke Mother     Osteoporosis Maternal Grandmother     Heart attack Maternal Grandmother         unknown    Heart disease Maternal Grandmother         unknown    Heart attack Maternal Grandfather         unknown    Heart disease Maternal Grandfather         unknown    Breast cancer Paternal Aunt     Prostate cancer Paternal Uncle     Heart disease Paternal Grandfather     Heart disease Paternal Grandmother      Ovarian cancer Neg Hx     Colon cancer Neg Hx     Uterine cancer Neg Hx       Social History     Socioeconomic History    Marital status: Single   Tobacco Use    Smoking status: Every Day     Current packs/day: 1.00     Average packs/day: 1 pack/day for 20.0 years (20.0 ttl pk-yrs)     Types: Cigarettes     Passive exposure: Never    Smokeless tobacco: Never    Tobacco comments:     He is aware of consequences of smoking.    Vaping Use    Vaping status: Former    Substances: Nicotine    Devices: Pre-filled or refillable cartridge    Passive vaping exposure: Yes   Substance and Sexual Activity    Alcohol use: No    Drug use: No    Sexual activity: Yes     Partners: Female     Birth control/protection: None         Clinical Support on 12/03/2024   Component Date Value Ref Range Status    Glucose 12/03/2024 99  65 - 99 mg/dL Final    BUN 12/03/2024 10  6 - 20 mg/dL Final    Creatinine 12/03/2024 1.06 (H)  0.57 - 1.00 mg/dL Final    Sodium 12/03/2024 138  136 - 145 mmol/L Final    Potassium 12/03/2024 4.8  3.5 - 5.2 mmol/L Final    Chloride 12/03/2024 104  98 - 107 mmol/L Final    CO2 12/03/2024 22.0  22.0 - 29.0 mmol/L Final    Calcium 12/03/2024 9.5  8.6 - 10.5 mg/dL Final    Total Protein 12/03/2024 6.9  6.0 - 8.5 g/dL Final    Albumin 12/03/2024 4.4  3.5 - 5.2 g/dL Final    ALT (SGPT) 12/03/2024 61 (H)  1 - 33 U/L Final    AST (SGOT) 12/03/2024 31  1 - 32 U/L Final    Alkaline Phosphatase 12/03/2024 102  39 - 117 U/L Final    Total Bilirubin 12/03/2024 0.4  0.0 - 1.2 mg/dL Final    Globulin 12/03/2024 2.5  gm/dL Final    A/G Ratio 12/03/2024 1.8  g/dL Final    BUN/Creatinine Ratio 12/03/2024 9.4  7.0 - 25.0 Final    Anion Gap 12/03/2024 12.0  5.0 - 15.0 mmol/L Final    eGFR 12/03/2024 71.7  >60.0 mL/min/1.73 Final    Total Cholesterol 12/03/2024 209 (H)  0 - 200 mg/dL Final    Triglycerides 12/03/2024 195 (H)  0 - 150 mg/dL Final    HDL Cholesterol 12/03/2024 33 (L)  40 - 60 mg/dL Final    LDL Cholesterol   12/03/2024 141 (H)  0 - 100 mg/dL Final    VLDL Cholesterol 12/03/2024 35  5 - 40 mg/dL Final    LDL/HDL Ratio 12/03/2024 4.15   Final    Hemoglobin A1C 12/03/2024 5.80 (H)  4.80 - 5.60 % Final    TSH 12/03/2024 3.250  0.270 - 4.200 uIU/mL Final    Testosterone, Total 12/03/2024 269.5 (H)  10.0 - 55.0 ng/dL Final    **Verified by repeat analysis**    Testosterone, Free 12/03/2024 9.2 (H)  0.0 - 4.2 pg/mL Final    WBC 12/03/2024 7.25  3.40 - 10.80 10*3/mm3 Final    RBC 12/03/2024 5.90 (H)  3.77 - 5.28 10*6/mm3 Final    Hemoglobin 12/03/2024 18.3 (H)  12.0 - 15.9 g/dL Final    Hematocrit 12/03/2024 52.6 (H)  34.0 - 46.6 % Final    MCV 12/03/2024 89.2  79.0 - 97.0 fL Final    MCH 12/03/2024 31.0  26.6 - 33.0 pg Final    MCHC 12/03/2024 34.8  31.5 - 35.7 g/dL Final    RDW 12/03/2024 12.3  12.3 - 15.4 % Final    RDW-SD 12/03/2024 40.0  37.0 - 54.0 fl Final    MPV 12/03/2024 11.6  6.0 - 12.0 fL Final    Platelets 12/03/2024 274  140 - 450 10*3/mm3 Final    Neutrophil % 12/03/2024 60.2  42.7 - 76.0 % Final    Lymphocyte % 12/03/2024 30.2  19.6 - 45.3 % Final    Monocyte % 12/03/2024 5.2  5.0 - 12.0 % Final    Eosinophil % 12/03/2024 3.3  0.3 - 6.2 % Final    Basophil % 12/03/2024 0.8  0.0 - 1.5 % Final    Immature Grans % 12/03/2024 0.3  0.0 - 0.5 % Final    Neutrophils, Absolute 12/03/2024 4.36  1.70 - 7.00 10*3/mm3 Final    Lymphocytes, Absolute 12/03/2024 2.19  0.70 - 3.10 10*3/mm3 Final    Monocytes, Absolute 12/03/2024 0.38  0.10 - 0.90 10*3/mm3 Final    Eosinophils, Absolute 12/03/2024 0.24  0.00 - 0.40 10*3/mm3 Final    Basophils, Absolute 12/03/2024 0.06  0.00 - 0.20 10*3/mm3 Final    Immature Grans, Absolute 12/03/2024 0.02  0.00 - 0.05 10*3/mm3 Final    nRBC 12/03/2024 0.0  0.0 - 0.2 /100 WBC Final         Physical Exam  Vitals and nursing note reviewed.   Constitutional:       Appearance: Normal appearance. He is normal weight.   HENT:      Head: Normocephalic and atraumatic.   Cardiovascular:      Rate and  Rhythm: Normal rate and regular rhythm.      Pulses: Normal pulses.      Heart sounds: Normal heart sounds. No murmur heard.     No friction rub. No gallop.   Pulmonary:      Effort: Pulmonary effort is normal. No respiratory distress.      Breath sounds: Normal breath sounds. No stridor. No wheezing, rhonchi or rales.   Chest:      Chest wall: No tenderness.   Abdominal:      General: Abdomen is flat. Bowel sounds are normal. There is no distension.      Palpations: Abdomen is soft. There is no mass.      Tenderness: There is no abdominal tenderness. There is no right CVA tenderness, left CVA tenderness, guarding or rebound.      Hernia: No hernia is present.   Skin:     General: Skin is warm and dry.      Capillary Refill: Capillary refill takes less than 2 seconds.      Coloration: Skin is not jaundiced or pale.      Findings: Wound present.          Neurological:      General: No focal deficit present.      Mental Status: He is alert and oriented to person, place, and time. Mental status is at baseline.      Motor: No weakness.      Coordination: Coordination normal.      Gait: Gait normal.   Psychiatric:         Mood and Affect: Mood normal.         Behavior: Behavior normal.         Thought Content: Thought content normal.         Judgment: Judgment normal.          Physical Exam         Result Review  Data Reviewed:{ Labs  Result Review  Imaging  Med Tab  Media :23}   I have reviewed this patient's chart.  I have reviewed previous labs, previous imaging, previous medications, and previous encounters with notes that were available in this patient's chart.    Results  Imaging  CT of the head shows an opacity in the left maxillary sinus.              Assessment and Plan {CC Problem List  Visit Diagnosis  ROS  Review (Popup)  [facility]  Quality  BestPractice  Medications  SmartSets  SnapShot Encounters  Media :23}   Diagnoses and all orders for this visit:    1. Skin infection  "(Primary)  -     Anaerobic & Aerobic Culture ([facility] Only) - Swab, Umbilicus; Future  -     Anaerobic & Aerobic Culture ([facility] Only) - Swab, Umbilicus    2. Maxillary sinus cyst  -     Ambulatory Referral to ENT (Otolaryngology)    3. Mild intermittent asthma with acute exacerbation  -     albuterol sulfate  (90 Base) MCG/ACT inhaler; Inhale 2 puffs Every 4 (Four) Hours As Needed for Wheezing.  Dispense: 18 g; Refill: 2    4. Hormone imbalance  -     Syringe 21G X 1\" 3 ML misc; Use 1 each 1 (One) Time Per Week.  Dispense: 50 each; Refill: 1  -     Needle, Disp, (B-D BLUNT FILL NEEDLE) 18G X 1-1/2\" misc; Use 1 each 1 (One) Time Per Week.  Dispense: 50 each; Refill: 1    5. Gender dysphoria in adult  -     Syringe 21G X 1\" 3 ML misc; Use 1 each 1 (One) Time Per Week.  Dispense: 50 each; Refill: 1  -     Needle, Disp, (B-D BLUNT FILL NEEDLE) 18G X 1-1/2\" misc; Use 1 each 1 (One) Time Per Week.  Dispense: 50 each; Refill: 1        Assessment & Plan  1. Umbilical drainage.  The symptoms suggest a potential MRSA infection. A swab will be obtained from the umbilical region for culture and sensitivity testing. The results are expected within 3 days. If the culture does not confirm MRSA, alternative diagnostic approaches will be considered to identify the underlying cause and implement appropriate preventive measures.    2. Left maxillary sinus cyst.  A referral to an ENT specialist has been initiated for further evaluation and management of the cyst. The patient will be contacted by the ENT office within 2 to 3 business days. If no contact is made, the patient is advised to call back and speak with Lauren.    3. Paranoid schizophrenia.  The patient's medication list has been updated to include Cobenvy, which is currently being provided as a sample.    4. Testosterone therapy.  A prescription for 3 mL 21-gauge syringes and separate 18-gauge needles has been provided. The patient is advised to continue with the " current dosage of 3 units as recommended by Dr. Alvarez.       -ER red flags discussed with patient including risk versus benefit and education provided.  -Follow-up with me in 2 weeks if not improving.    I spent 30 minutes caring for Costa on this date of service. This time includes time spent by me in the following activities:preparing for the visit, reviewing tests, obtaining and/or reviewing a separately obtained history, performing a medically appropriate examination and/or evaluation , counseling and educating the patient/family/caregiver, ordering medications, tests, or procedures, referring and communicating with other health care professionals , documenting information in the medical record, independently interpreting results and communicating that information with the patient/family/caregiver, and care coordination.    Follow Up {Instructions Charge Capture  Follow-up Communications :23}     Patient was given instructions and counseling regarding his condition or for health maintenance advice. Please see specific information pulled into the AVS (placed there by myself) if appropriate.    Return in about 2 weeks (around 12/27/2024) for Recheck.    Class 3 Severe Obesity (BMI >=40). Obesity-related health conditions include the following: none. Obesity is unchanged. BMI is is above average; BMI management plan is completed. We discussed portion control and increasing exercise.         RAHUL Ayoub, [facility]      Patient or patient representative verbalized consent for the use of Ambient Listening during the visit with  RAHUL Ayoub for chart documentation. 12/13/2024  16:22 EST

## 2024-12-15 LAB
BACTERIA SPEC AEROBE CULT: NORMAL
GRAM STN SPEC: NORMAL
GRAM STN SPEC: NORMAL

## 2024-12-16 ENCOUNTER — PATIENT MESSAGE (OUTPATIENT)
Dept: FAMILY MEDICINE CLINIC | Facility: CLINIC | Age: 34
End: 2024-12-16
Payer: MEDICAID

## 2024-12-16 ENCOUNTER — PRIOR AUTHORIZATION (OUTPATIENT)
Dept: FAMILY MEDICINE CLINIC | Facility: CLINIC | Age: 34
End: 2024-12-16
Payer: MEDICAID

## 2024-12-16 DIAGNOSIS — Z78.9 FEMALE-TO-MALE TRANSGENDER PERSON: ICD-10-CM

## 2024-12-16 DIAGNOSIS — F64.0 GENDER DYSPHORIA IN ADULT: Primary | ICD-10-CM

## 2024-12-16 LAB — BACTERIA SPEC ANAEROBE CULT: ABNORMAL

## 2024-12-17 ENCOUNTER — PATIENT MESSAGE (OUTPATIENT)
Dept: FAMILY MEDICINE CLINIC | Facility: CLINIC | Age: 34
End: 2024-12-17
Payer: MEDICAID

## 2024-12-19 ENCOUNTER — CLINICAL SUPPORT (OUTPATIENT)
Dept: FAMILY MEDICINE CLINIC | Facility: CLINIC | Age: 34
End: 2024-12-19
Payer: MEDICAID

## 2024-12-19 DIAGNOSIS — E34.9 HORMONE IMBALANCE: Primary | ICD-10-CM

## 2024-12-19 PROCEDURE — 95115 IMMUNOTHERAPY ONE INJECTION: CPT | Performed by: REGISTERED NURSE

## 2024-12-23 NOTE — TELEPHONE ENCOUNTER
Called lab several times repeatedly and No one answered. Had to leave a detailed VM asking for specifics on this culture.

## 2024-12-23 NOTE — TELEPHONE ENCOUNTER
Lab called back and said it grew Normal Skin Joycelyn. If, It was Abnormal they would had pulled it and done Sensitivity tests on it.

## 2025-02-11 ENCOUNTER — TELEMEDICINE (OUTPATIENT)
Dept: FAMILY MEDICINE CLINIC | Facility: CLINIC | Age: 35
End: 2025-02-11
Payer: MEDICAID

## 2025-02-11 DIAGNOSIS — Z78.9 FEMALE-TO-MALE TRANSGENDER PERSON: ICD-10-CM

## 2025-02-11 DIAGNOSIS — E34.9 HORMONE IMBALANCE: ICD-10-CM

## 2025-02-11 DIAGNOSIS — F51.01 PRIMARY INSOMNIA: ICD-10-CM

## 2025-02-11 DIAGNOSIS — J45.21 MILD INTERMITTENT ASTHMA WITH ACUTE EXACERBATION: ICD-10-CM

## 2025-02-11 DIAGNOSIS — R79.89 LOW SERUM TESTOSTERONE: ICD-10-CM

## 2025-02-11 DIAGNOSIS — F64.0 GENDER DYSPHORIA IN ADULT: Primary | ICD-10-CM

## 2025-02-11 PROCEDURE — 1125F AMNT PAIN NOTED PAIN PRSNT: CPT | Performed by: REGISTERED NURSE

## 2025-02-11 PROCEDURE — 1160F RVW MEDS BY RX/DR IN RCRD: CPT | Performed by: REGISTERED NURSE

## 2025-02-11 PROCEDURE — 99214 OFFICE O/P EST MOD 30 MIN: CPT | Performed by: REGISTERED NURSE

## 2025-02-11 PROCEDURE — 1159F MED LIST DOCD IN RCRD: CPT | Performed by: REGISTERED NURSE

## 2025-02-11 PROCEDURE — T1015 CLINIC SERVICE: HCPCS | Performed by: REGISTERED NURSE

## 2025-02-11 RX ORDER — SYRINGE W-NEEDLE,DISPOSAB,3 ML 25GX5/8"
1 SYRINGE, EMPTY DISPOSABLE MISCELLANEOUS WEEKLY
Qty: 4 EACH | Refills: 2 | Status: SHIPPED | OUTPATIENT
Start: 2025-02-11

## 2025-02-11 RX ORDER — SYRINGE, DISPOSABLE, 3 ML
1 SYRINGE, EMPTY DISPOSABLE MISCELLANEOUS WEEKLY
Qty: 4 EACH | Refills: 2 | Status: SHIPPED | OUTPATIENT
Start: 2025-02-11

## 2025-02-11 RX ORDER — SYRINGE W-NEEDLE,DISPOSAB,3 ML 25GX5/8"
1 SYRINGE, EMPTY DISPOSABLE MISCELLANEOUS WEEKLY
Qty: 4 EACH | Refills: 2 | Status: SHIPPED | OUTPATIENT
Start: 2025-02-11 | End: 2025-02-11

## 2025-02-11 RX ORDER — TRAZODONE HYDROCHLORIDE 100 MG/1
100 TABLET ORAL NIGHTLY
Qty: 30 TABLET | Refills: 1 | Status: SHIPPED | OUTPATIENT
Start: 2025-02-11

## 2025-02-11 RX ORDER — ALBUTEROL SULFATE 90 UG/1
2 AEROSOL, METERED RESPIRATORY (INHALATION) EVERY 4 HOURS PRN
Qty: 18 G | Refills: 3 | Status: SHIPPED | OUTPATIENT
Start: 2025-02-11

## 2025-02-11 NOTE — PROGRESS NOTES
[facility] PRIMARY CARE  Patient: Costa Watson   Age: 34 y.o.  Sex: adult  :  1990  MRN: [id number]    Cosat Watson was located at home and I was located at my office for this telemedicine/telephone encounter. We utilized the telephone [employer] video option for the encounter and Costa Watson and I were able to hear and see each other simultaneously in real time. I introduced myself and verified Costa Watson identity. I explained how the telemedicine visit will occur. I advised Costa Watson that technology-related delays and breaches of privacy are potential risks associated with conducting the encounter via telemedicine.    I also advised Costa Watson that at any point he may terminate the telemedicine encounter and withdraw his consent for receiving care via telemedicine without affecting his ability to receive future care from us, and that I may also terminate the telemedicine encounter if I determine that an in-person visit is more appropriate for the condition[s] for which treatment is sought.    Having covered these considerations, Costa Watson verbally acknowledged them and gave consent for the use of telemedicine in his care.    Start time: 1301  End time 1317    CHIEF COMPLAINT:  No chief complaint on file.       The following portions of the chart were reviewed this encounter and updated as appropriate:    History of Present Illness  Patient is a 34-year-old trans male who presents to the clinic today for 3-month routine follow-up for transgender care, chronic asthma, and concerns of worsening insomnia greater than 2 months    In regards to transgender care, patient is currently prescribed testosterone for hormone imbalance.  Patient's last serum testosterone was December 3, 2024.  His testosterone level at that time had somewhat improved to 269.5.  The goal would be between 300-500.  Patient denies any significant issues or concerns with his testosterone injections.  He does  voice that he has had difficulty being able to get syringes to give his medication.  He shares that insurance is not covering the syringes well for him.  We did discuss ways to get syringes by paying out-of-pocket but patient does share that he is struggling financially and has difficulty doing this.  I offered to reach out to local pharmacies to help with more cost effective options.  I did find out that [facility] pharmacy is willing to sell individual needles but multi packs of syringes.  I have advised patient to reach out to them.  I will also send over prescription for this.  Patient had previously requested a referral to plastic surgery to take the next step in transition.  This referral was sent but patient shares that he has not received a phone call yet from the referral.  This information will be given to patient today so that he can reach out directly to help further getting this appointment.    In regards to chronic asthma, patient's insurance refused to pay for generic Ventolin.  They had requested a brand specific Ventolin, written as a dispense as written option.  This was sent over to [facility] as patient had requested but patient has not received it yet.  We discussed resending this again today.    In regards to insomnia, patient shares that he has a significant past history of insomnia.  He is not required recent medication to help with this.  The last medication he tried caused significant side effects he shares.  Patient does not remember the exact name of that medication but is willing to try different options today.  We discussed trazodone as a potential option for this.  Risks, side effects, and benefits discussed at length.    Patient also struggles with prediabetes.  He reports that his glucose in the mornings have been above 200 but dropped to a normal range throughout the day.  I would strongly advised patient to eat a very low carb diet in the evening and increase his protein options before  bedtime for stability of glucose throughout the night.  I also would recommend patient keep a log of his food intake after lunch until bedtime so that we can see how to optimize his glucose control options at a future visit.       There were no vitals taken for this visit.   Allergies   Allergen Reactions    Latex Hives     Past Medical History:   Diagnosis Date    Acid reflux     ADHD (attention deficit hyperactivity disorder)     Allergic     Pollen/mold/cats    Anxiety     Arthritis     Asthma     C. difficile colitis     Cyst, sinus nasal     Depression     Diverticulosis     Fibromyalgia     Hilar mass     histoplasmosis    Histoplasmosis     Hx of gastroesophageal reflux (GERD)     Hyperlipidemia     Low back pain     NSTEMI (non-ST elevated myocardial infarction) 09/09/2018    related to histoplasmosis    Obesity     Palpitations     PCOS (polycystic ovarian syndrome)     Schizoaffective disorder        REVIEW OF SYSTEMS  Review of Systems   Constitutional: Negative.  Negative for activity change, chills, fatigue and fever.   HENT: Negative.  Negative for congestion, dental problem, ear pain, hearing loss, rhinorrhea, sinus pain, sore throat, tinnitus and trouble swallowing.    Eyes: Negative.  Negative for pain and visual disturbance.   Respiratory: Negative.  Negative for cough, chest tightness, shortness of breath and wheezing.    Cardiovascular: Negative.  Negative for chest pain, palpitations and leg swelling.   Gastrointestinal: Negative.  Negative for abdominal pain, diarrhea, nausea and vomiting.   Endocrine: Negative.  Negative for polydipsia, polyphagia and polyuria.   Genitourinary: Negative.  Negative for difficulty urinating, dysuria, frequency and urgency.   Musculoskeletal: Negative.  Negative for arthralgias, back pain and myalgias.   Skin: Negative.  Negative for color change, pallor, rash and wound.   Allergic/Immunologic: Negative.  Negative for environmental allergies.   Neurological:  Negative.  Negative for dizziness, speech difficulty, weakness, light-headedness, numbness and headaches.   Hematological: Negative.    Psychiatric/Behavioral:  Positive for sleep disturbance. Negative for confusion, decreased concentration, self-injury and suicidal ideas. The patient is not nervous/anxious.    All other systems reviewed and are negative.             LAB REVIEW  Office Visit on 12/13/2024   Component Date Value Ref Range Status    Wound Culture 12/13/2024 Moderate growth (3+) Normal Skin Joycelyn   Final    Gram Stain 12/13/2024 Few (2+) WBCs per low power field   Final    Gram Stain 12/13/2024 Moderate (3+) Mixed bacterial morphotypes seen on Gram Stain   Final    Anaerobic Culture 12/13/2024 Mixed Anaerobic Organisms (A)   Final   Clinical Support on 12/03/2024   Component Date Value Ref Range Status    Glucose 12/03/2024 99  65 - 99 mg/dL Final    BUN 12/03/2024 10  6 - 20 mg/dL Final    Creatinine 12/03/2024 1.06 (H)  0.57 - 1.00 mg/dL Final    Sodium 12/03/2024 138  136 - 145 mmol/L Final    Potassium 12/03/2024 4.8  3.5 - 5.2 mmol/L Final    Chloride 12/03/2024 104  98 - 107 mmol/L Final    CO2 12/03/2024 22.0  22.0 - 29.0 mmol/L Final    Calcium 12/03/2024 9.5  8.6 - 10.5 mg/dL Final    Total Protein 12/03/2024 6.9  6.0 - 8.5 g/dL Final    Albumin 12/03/2024 4.4  3.5 - 5.2 g/dL Final    ALT (SGPT) 12/03/2024 61 (H)  1 - 33 U/L Final    AST (SGOT) 12/03/2024 31  1 - 32 U/L Final    Alkaline Phosphatase 12/03/2024 102  39 - 117 U/L Final    Total Bilirubin 12/03/2024 0.4  0.0 - 1.2 mg/dL Final    Globulin 12/03/2024 2.5  gm/dL Final    A/G Ratio 12/03/2024 1.8  g/dL Final    BUN/Creatinine Ratio 12/03/2024 9.4  7.0 - 25.0 Final    Anion Gap 12/03/2024 12.0  5.0 - 15.0 mmol/L Final    eGFR 12/03/2024 71.7  >60.0 mL/min/1.73 Final    Total Cholesterol 12/03/2024 209 (H)  0 - 200 mg/dL Final    Triglycerides 12/03/2024 195 (H)  0 - 150 mg/dL Final    HDL Cholesterol 12/03/2024 33 (L)  40 - 60 mg/dL  Final    LDL Cholesterol  12/03/2024 141 (H)  0 - 100 mg/dL Final    VLDL Cholesterol 12/03/2024 35  5 - 40 mg/dL Final    LDL/HDL Ratio 12/03/2024 4.15   Final    Hemoglobin A1C 12/03/2024 5.80 (H)  4.80 - 5.60 % Final    TSH 12/03/2024 3.250  0.270 - 4.200 uIU/mL Final    Testosterone, Total 12/03/2024 269.5 (H)  10.0 - 55.0 ng/dL Final    **Verified by repeat analysis**    Testosterone, Free 12/03/2024 9.2 (H)  0.0 - 4.2 pg/mL Final    WBC 12/03/2024 7.25  3.40 - 10.80 10*3/mm3 Final    RBC 12/03/2024 5.90 (H)  3.77 - 5.28 10*6/mm3 Final    Hemoglobin 12/03/2024 18.3 (H)  12.0 - 15.9 g/dL Final    Hematocrit 12/03/2024 52.6 (H)  34.0 - 46.6 % Final    MCV 12/03/2024 89.2  79.0 - 97.0 fL Final    MCH 12/03/2024 31.0  26.6 - 33.0 pg Final    MCHC 12/03/2024 34.8  31.5 - 35.7 g/dL Final    RDW 12/03/2024 12.3  12.3 - 15.4 % Final    RDW-SD 12/03/2024 40.0  37.0 - 54.0 fl Final    MPV 12/03/2024 11.6  6.0 - 12.0 fL Final    Platelets 12/03/2024 274  140 - 450 10*3/mm3 Final    Neutrophil % 12/03/2024 60.2  42.7 - 76.0 % Final    Lymphocyte % 12/03/2024 30.2  19.6 - 45.3 % Final    Monocyte % 12/03/2024 5.2  5.0 - 12.0 % Final    Eosinophil % 12/03/2024 3.3  0.3 - 6.2 % Final    Basophil % 12/03/2024 0.8  0.0 - 1.5 % Final    Immature Grans % 12/03/2024 0.3  0.0 - 0.5 % Final    Neutrophils, Absolute 12/03/2024 4.36  1.70 - 7.00 10*3/mm3 Final    Lymphocytes, Absolute 12/03/2024 2.19  0.70 - 3.10 10*3/mm3 Final    Monocytes, Absolute 12/03/2024 0.38  0.10 - 0.90 10*3/mm3 Final    Eosinophils, Absolute 12/03/2024 0.24  0.00 - 0.40 10*3/mm3 Final    Basophils, Absolute 12/03/2024 0.06  0.00 - 0.20 10*3/mm3 Final    Immature Grans, Absolute 12/03/2024 0.02  0.00 - 0.05 10*3/mm3 Final    nRBC 12/03/2024 0.0  0.0 - 0.2 /100 WBC Final           1. Gender dysphoria in adult    2. Hormone imbalance    3. Low serum testosterone    4. Mild intermittent asthma with acute exacerbation    5. Primary insomnia    6. 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  -Brand-name Ventolin sent to pharmacy again for patient per insurance request and patient's request.  -Plastic surgeon contact information will be given to patient via phone call from staff.  -Multiple options discussed to help with patient getting access to syringe and needles for his testosterone injections.  Scripts have been sent to [facility] and [facility] as well as strongly encourage patient to look on [employer] for cheaper options.  -ER red flags discussed with patient.  -Follow-up in 6 weeks for new insomnia medication as well as testosterone continuity.            RAHUL Ayoub, FNP-BC  2/11/2025 13:43 EST      Patient or patient representative verbalized consent for the use of Ambient List[employer] during the visit with  RAHUL Ayoub for chart documentation. 2/11/2025  13:35 EST

## 2025-02-13 DIAGNOSIS — E34.9 HORMONE IMBALANCE: ICD-10-CM

## 2025-02-13 DIAGNOSIS — F64.0 GENDER DYSPHORIA IN ADULT: ICD-10-CM

## 2025-02-13 NOTE — TELEPHONE ENCOUNTER
Rx Refill Note  Requested Prescriptions     Pending Prescriptions Disp Refills    Testosterone Cypionate (Depo-Testosterone) 200 MG/ML injection 2 mL 1     Sig: Inject 1 mL into the appropriate muscle as directed by prescriber Every 14 (Fourteen) Days.      Last office visit with prescribing clinician: 12/13/2024   Last telemedicine visit with prescribing clinician: 2/11/2025           UDS     None on file  CSA     None on file      INSPECT - SCAN - INSPECT/ [facility] [place]/ 02/13/2025 (02/13/2025)       Floridalma Choi MA  02/13/25, 08:43 EST

## 2025-02-14 RX ORDER — TESTOSTERONE CYPIONATE 200 MG/ML
200 INJECTION, SOLUTION INTRAMUSCULAR
Qty: 2 ML | Refills: 1 | Status: SHIPPED | OUTPATIENT
Start: 2025-02-14

## 2025-02-18 DIAGNOSIS — F64.0 GENDER DYSPHORIA IN ADULT: ICD-10-CM

## 2025-02-18 DIAGNOSIS — E34.9 HORMONE IMBALANCE: ICD-10-CM

## 2025-02-18 RX ORDER — TESTOSTERONE CYPIONATE 200 MG/ML
200 INJECTION, SOLUTION INTRAMUSCULAR
Qty: 2 ML | Refills: 1 | Status: SHIPPED | OUTPATIENT
Start: 2025-02-18

## 2025-02-18 NOTE — TELEPHONE ENCOUNTER
Rx Refill Note  Requested Prescriptions     Pending Prescriptions Disp Refills    Testosterone Cypionate (Depo-Testosterone) 200 MG/ML injection 2 mL 1     Sig: Inject 1 mL into the appropriate muscle as directed by prescriber Every 14 (Fourteen) Days.      Last office visit with prescribing clinician: 12/13/2024   Last telemedicine visit with prescribing clinician: 2/11/2025   Next office visit with prescribing clinician: 3/25/2025         UDS       None on file  CSA       None on file    INSPECT - SCAN - INSPECT/ [facility] [place]/ 02/18/2025 (02/18/2025)       Floridalma Choi MA  02/18/25, 15:42 EST

## 2025-03-05 ENCOUNTER — TELEPHONE (OUTPATIENT)
Dept: FAMILY MEDICINE CLINIC | Facility: CLINIC | Age: 35
End: 2025-03-05
Payer: MEDICAID

## 2025-03-05 NOTE — TELEPHONE ENCOUNTER
PT CALLED AND WANTED SAME DAY JB FOR COUGH CONGESTED I TOLD HIM THAT [facility] IS OUT OFFICE TODAY AND TOMORROW. GAVE HIM JB WITH [facility]  ON 03-06-25 PT DECLINED STATES HE HAS ANOTHER JB. ADVISE PT GO TO UC OR ER PT VOICE UNDERSTOOD

## 2025-03-06 ENCOUNTER — OFFICE VISIT (OUTPATIENT)
Dept: FAMILY MEDICINE CLINIC | Facility: CLINIC | Age: 35
End: 2025-03-06
Payer: MEDICAID

## 2025-03-06 DIAGNOSIS — U07.1 COVID-19: ICD-10-CM

## 2025-03-06 DIAGNOSIS — R52 BODY ACHES: ICD-10-CM

## 2025-03-06 DIAGNOSIS — R05.1 ACUTE COUGH: Primary | ICD-10-CM

## 2025-03-06 LAB
EXPIRATION DATE: ABNORMAL
EXPIRATION DATE: NORMAL
FLUAV AG UPPER RESP QL IA.RAPID: NOT DETECTED
FLUBV AG UPPER RESP QL IA.RAPID: NOT DETECTED
INTERNAL CONTROL: ABNORMAL
INTERNAL CONTROL: NORMAL
Lab: ABNORMAL
Lab: NORMAL
S PYO AG THROAT QL: NEGATIVE
SARS-COV-2 AG UPPER RESP QL IA.RAPID: DETECTED

## 2025-03-06 PROCEDURE — 87880 STREP A ASSAY W/OPTIC: CPT

## 2025-03-06 PROCEDURE — 99213 OFFICE O/P EST LOW 20 MIN: CPT

## 2025-03-06 PROCEDURE — T1015 CLINIC SERVICE: HCPCS

## 2025-03-06 RX ORDER — IBUPROFEN 600 MG/1
600 TABLET, FILM COATED ORAL EVERY 8 HOURS PRN
Qty: 90 TABLET | Refills: 0 | Status: SHIPPED | OUTPATIENT
Start: 2025-03-06 | End: 2025-03-06

## 2025-03-06 RX ORDER — BROMPHENIRAMINE MALEATE, PSEUDOEPHEDRINE HYDROCHLORIDE, AND DEXTROMETHORPHAN HYDROBROMIDE 2; 30; 10 MG/5ML; MG/5ML; MG/5ML
10 SYRUP ORAL 4 TIMES DAILY PRN
Qty: 118 ML | Refills: 0 | Status: SHIPPED | OUTPATIENT
Start: 2025-03-06

## 2025-03-06 RX ORDER — IBUPROFEN 600 MG/1
600 TABLET, FILM COATED ORAL EVERY 8 HOURS PRN
Qty: 90 TABLET | Refills: 0 | Status: SHIPPED | OUTPATIENT
Start: 2025-03-06

## 2025-03-10 DIAGNOSIS — K21.9 GASTROESOPHAGEAL REFLUX DISEASE WITHOUT ESOPHAGITIS: ICD-10-CM

## 2025-03-10 RX ORDER — OMEPRAZOLE 40 MG/1
40 CAPSULE, DELAYED RELEASE ORAL DAILY
Qty: 30 CAPSULE | Refills: 2 | Status: SHIPPED | OUTPATIENT
Start: 2025-03-10

## 2025-03-11 ENCOUNTER — PRIOR AUTHORIZATION (OUTPATIENT)
Dept: FAMILY MEDICINE CLINIC | Facility: CLINIC | Age: 35
End: 2025-03-11
Payer: MEDICAID

## 2025-03-17 ENCOUNTER — CLINICAL SUPPORT (OUTPATIENT)
Dept: FAMILY MEDICINE CLINIC | Facility: CLINIC | Age: 35
End: 2025-03-17
Payer: MEDICAID

## 2025-03-17 DIAGNOSIS — E34.9 HORMONE IMBALANCE: Primary | ICD-10-CM

## 2025-03-17 RX ADMIN — TESTOSTERONE CYPIONATE 200 MG: 200 INJECTION, SOLUTION INTRAMUSCULAR at 08:33

## 2025-03-18 RX ORDER — TESTOSTERONE CYPIONATE 200 MG/ML
200 INJECTION, SOLUTION INTRAMUSCULAR ONCE
Status: COMPLETED | OUTPATIENT
Start: 2025-03-18 | End: 2025-03-17

## 2025-03-19 ENCOUNTER — NURSE TRIAGE (OUTPATIENT)
Dept: CALL CENTER | Facility: HOSPITAL | Age: 35
End: 2025-03-19
Payer: MEDICAID

## 2025-03-19 ENCOUNTER — OFFICE VISIT (OUTPATIENT)
Dept: FAMILY MEDICINE CLINIC | Facility: CLINIC | Age: 35
End: 2025-03-19
Payer: MEDICAID

## 2025-03-19 VITALS
SYSTOLIC BLOOD PRESSURE: 132 MMHG | WEIGHT: 284.6 LBS | BODY MASS INDEX: 43.13 KG/M2 | HEART RATE: 104 BPM | HEIGHT: 68 IN | DIASTOLIC BLOOD PRESSURE: 89 MMHG | TEMPERATURE: 98.5 F | OXYGEN SATURATION: 96 %

## 2025-03-19 DIAGNOSIS — R03.0 ELEVATED BLOOD PRESSURE READING WITHOUT DIAGNOSIS OF HYPERTENSION: Primary | ICD-10-CM

## 2025-03-19 PROCEDURE — 1160F RVW MEDS BY RX/DR IN RCRD: CPT

## 2025-03-19 PROCEDURE — 99214 OFFICE O/P EST MOD 30 MIN: CPT

## 2025-03-19 PROCEDURE — T1015 CLINIC SERVICE: HCPCS

## 2025-03-19 PROCEDURE — 1159F MED LIST DOCD IN RCRD: CPT

## 2025-03-19 PROCEDURE — 1125F AMNT PAIN NOTED PAIN PRSNT: CPT

## 2025-03-19 RX ORDER — HYDROCHLOROTHIAZIDE 25 MG/1
25 TABLET ORAL DAILY
Qty: 30 TABLET | Refills: 0 | Status: SHIPPED | OUTPATIENT
Start: 2025-03-19

## 2025-03-19 RX ORDER — BLOOD PRESSURE TEST KIT-LARGE
1 KIT MISCELLANEOUS 2 TIMES DAILY
Qty: 1 EACH | Refills: 0 | Status: SHIPPED | OUTPATIENT
Start: 2025-03-19

## 2025-03-19 RX ORDER — OXYBUTYNIN CHLORIDE 15 MG/1
15 TABLET, EXTENDED RELEASE ORAL DAILY
COMMUNITY

## 2025-03-19 RX ORDER — MIRTAZAPINE 15 MG/1
15 TABLET, FILM COATED ORAL NIGHTLY
COMMUNITY
Start: 2025-02-20

## 2025-03-19 RX ORDER — METHYLPHENIDATE HYDROCHLORIDE 20 MG/1
20 CAPSULE ORAL NIGHTLY
COMMUNITY
Start: 2025-02-20

## 2025-03-19 NOTE — TELEPHONE ENCOUNTER
Patient seen at dentist this morning. /113. Denies any associated symptoms. Connected patient with Estefania at [facility]'s office to schedule appointment. Advised to be seen at Urgent Care if PCP unable to see within 24 hours.    Reason for Disposition   Systolic BP  >= 180 OR Diastolic >= 110    Additional Information   Negative: Difficult to awaken or acting confused (e.g., disoriented, slurred speech)   Negative: SEVERE difficulty breathing (e.g., struggling for each breath, speaks in single words)   Negative: [1] Weakness of the face, arm or leg on one side of the body AND [2] new-onset   Negative: [1] Numbness (i.e., loss of sensation) of the face, arm or leg on one side of the body AND [2] new-onset   Negative: [1] Chest pain lasts > 5 minutes AND [2] history of heart disease (i.e., heart attack, bypass surgery, angina, angioplasty, CHF)   Negative: [1] Chest pain AND [2] took nitrogylcerin AND [3] pain was not relieved   Negative: Sounds like a life-threatening emergency to the triager   Negative: Symptom is main concern (e.g., headache, chest pain)   Negative: Low blood pressure is main concern   Negative: [1] Systolic BP  >= 160 OR Diastolic >= 100 AND [2] cardiac (e.g., breathing difficulty, chest pain) or neurologic symptoms (e.g., new-onset blurred or double vision, unsteady gait)   Negative: [1] Pregnant 20 or more weeks (or postpartum < 6 weeks) AND [2] new hand or face swelling   Negative: [1] Pregnant 20 or more weeks (or postpartum < 6 weeks) AND [2] Systolic BP >= 160 OR Diastolic >= 110   Negative: [1] Systolic BP  >= 200 OR Diastolic >= 120 AND [2] having NO cardiac or neurologic symptoms   Negative: [1] Pregnant 20 or more weeks (or postpartum < 6 weeks) AND [2] Systolic BP  >= 140 OR Diastolic >= 90   Negative: [1] Systolic BP  >= 180 OR Diastolic >= 110 AND [2] missed most recent dose of blood pressure medication    Answer Assessment - Initial Assessment Questions  1. BLOOD  "PRESSURE: \"What is the blood pressure?\" \"Did you take at least two measurements 5 minutes apart?\"      156/113  2. ONSET: \"When did you take your blood pressure?\"      This morning  3. HOW: \"How did you take your blood pressure?\" (e.g., automatic home BP monitor, visiting nurse)      At dentist appointment  4. HISTORY: \"Do you have a history of high blood pressure?\"      Family history of heart problems  5. MEDICINES: \"Are you taking any medicines for blood pressure?\" \"Have you missed any doses recently?\"      No   6. OTHER SYMPTOMS: \"Do you have any symptoms?\" (e.g., blurred vision, chest pain, difficulty breathing, headache, weakness)      No   7. PREGNANCY: \"Is there any chance you are pregnant?\" \"When was your last menstrual period?\"      N/A    Protocols used: Blood Pressure - High-ADULT-AH    " with patient

## 2025-03-19 NOTE — PROGRESS NOTES
"Chief Complaint  Hypertension    Subjective    History of Present Illness {CC  Problem List  Visit  Diagnosis   Encounters  Notes  Medications  Labs  Result Review Imaging  Media :23}     Costa Watson presents to [facility] for Hypertension.      History of Present Illness     34 YOM presents with complaint of elevated blood pressure. Patient says he was at a dentist this morning, and they decided to hold off on the planned procedure for root canal for now because his blood pressure was elevated. It was apparently 156/113 in their office at the time. The dentist recommended follow up with PCP to consider bp control before patient proceeds their. The patient also apparently needed 1 week of antibiotics prior to the procedure regardless, but I think they were hoping for blood pressure control as well. He says he has ongoing headaches that could be associated with this. He denies blurred vision, nausea, chest pain, SOB. He says caffeine and smoking could contribute to elevated blood pressure. Patient is also taking testosterone we discussed can contribute to high blood pressure. Patient has never been on medication for blood pressure control in the past.     BP Readings from Last 3 Encounters:   03/19/25 132/89   12/13/24 (!) 139/108   11/25/24 124/80           Objective     Vital Signs:   /89 (BP Location: Right arm, Patient Position: Sitting, Cuff Size: Large Adult)   Pulse 104   Temp 98.5 °F (36.9 °C) (Temporal)   Ht 172.7 cm (67.99\")   Wt 129 kg (284 lb 9.6 oz)   SpO2 96%   BMI 43.28 kg/m²   Current Outpatient Medications on File Prior to Visit   Medication Sig Dispense Refill    Cariprazine HCl (VRAYLAR) 3 MG capsule capsule Take 1 capsule by mouth Daily.      ibuprofen (ADVIL,MOTRIN) 600 MG tablet Take 1 tablet by mouth Every 8 (Eight) Hours As Needed for Moderate Pain. 90 tablet 0    Jornay PM 20 MG capsule sustained-release 24 hr Take 1 capsule by mouth " "Every Night.      mirtazapine (REMERON) 15 MG tablet Take 1 tablet by mouth Every Night.      ondansetron ODT (ZOFRAN-ODT) 8 MG disintegrating tablet Place 1 tablet on the tongue Every 8 (Eight) Hours As Needed for Nausea or Vomiting. 15 tablet 0    oxybutynin XL (DITROPAN XL) 15 MG 24 hr tablet Take 1 tablet by mouth Daily.      sertraline (ZOLOFT) 100 MG tablet Take 2 tablets by mouth Daily.      Testosterone Cypionate (Depo-Testosterone) 200 MG/ML injection Inject 1 mL into the appropriate muscle as directed by prescriber Every 14 (Fourteen) Days. 2 mL 1    Ventolin  (90 Base) MCG/ACT inhaler Inhale 2 puffs Every 4 (Four) Hours As Needed for Wheezing. 18 g 3    [DISCONTINUED] traZODone (DESYREL) 100 MG tablet Take 1 tablet by mouth Every Night. 30 tablet 1    brompheniramine-pseudoephedrine-DM 30-2-10 MG/5ML syrup Take 10 mL by mouth 4 (Four) Times a Day As Needed for Cough or Congestion. 118 mL 0    busPIRone (BUSPAR) 7.5 MG tablet Take 1 tablet by mouth 2 (Two) Times a Day.      glucose blood test strip 1 each by Other route 2 (Two) Times a Day As Needed (blood sugar monitoring). (Patient not taking: Reported on 3/19/2025) 90 each 1    glucose monitor monitoring kit Use 1 each Daily. (Patient not taking: Reported on 3/19/2025) 1 each 1    Lancets Thin misc Use 1 Units Daily for 360 days. (Patient not taking: Reported on 3/19/2025) 90 each 1    Needle, Disp, (B-D BLUNT FILL NEEDLE) 18G X 1-1/2\" misc Use 1 each 1 (One) Time Per Week. (Patient not taking: Reported on 3/19/2025) 50 each 1    Needle, Disp, 20G X 1\" misc Use 1 each 1 (One) Time Per Week. (Patient not taking: Reported on 3/19/2025) 50 each 1    Syringe 21G X 1\" 3 ML misc Use 1 each 1 (One) Time Per Week. (Patient not taking: Reported on 3/19/2025) 4 each 2    Syringe, Disposable, (MONOJECT 3CC SYRINGE) 3 ML misc Use 1 each 1 (One) Time Per Week. (Patient not taking: Reported on 3/19/2025) 4 each 2    Testosterone Undecanoate (Aveed) 750 MG/3ML " solution Inject 3mL into the muscle every 10 weeks (Patient not taking: Reported on 3/19/2025) 3 mL 1    triamcinolone (KENALOG) 0.1 % ointment Apply 1 Application topically to the appropriate area as directed 2 (Two) Times a Day. 30 g 1    [DISCONTINUED] atomoxetine (STRATTERA) 40 MG capsule Take 1 capsule by mouth Daily.      [DISCONTINUED] brompheniramine-pseudoephedrine-DM 30-2-10 MG/5ML syrup Take 5 mL by mouth 4 (Four) Times a Day As Needed for Cough or Allergies. (Patient not taking: Reported on 11/25/2024) 120 mL 0    [DISCONTINUED] estradiol (Vagifem) 10 MCG tablet vaginal tablet Insert 1 tablet into vagina nightly for 2 weeks and then twice weekly 24 tablet 3    [DISCONTINUED] fluticasone (FLONASE) 50 MCG/ACT nasal spray 2 sprays into the nostril(s) as directed by provider Daily. 18.2 g 5    [DISCONTINUED] Lybalvi 10-10 MG tablet       [DISCONTINUED] omeprazole (priLOSEC) 40 MG capsule TAKE 1 CAPSULE BY MOUTH EVERY DAY 30 capsule 2     No current facility-administered medications on file prior to visit.        Past Medical History:   Diagnosis Date    Acid reflux     ADHD (attention deficit hyperactivity disorder)     Allergic     Pollen/mold/cats    Anxiety     Arthritis     Asthma     C. difficile colitis     Cyst, sinus nasal     Depression     Diverticulosis     Fibromyalgia     Hilar mass     histoplasmosis    Histoplasmosis     Hx of gastroesophageal reflux (GERD)     Hyperlipidemia     Low back pain     NSTEMI (non-ST elevated myocardial infarction) 09/09/2018    related to histoplasmosis    Obesity     Palpitations     PCOS (polycystic ovarian syndrome)     Schizoaffective disorder       Past Surgical History:   Procedure Laterality Date    CARDIAC CATHETERIZATION      CHOLECYSTECTOMY      laparoscopic    MEDIASTINOSCOPY        Family History   Problem Relation Age of Onset    Diabetes Father     Arthritis Father     Alzheimer's disease Mother     Stroke Mother     Osteoporosis Maternal Grandmother      Heart attack Maternal Grandmother         unknown    Heart disease Maternal Grandmother         unknown    Heart attack Maternal Grandfather         unknown    Heart disease Maternal Grandfather         unknown    Breast cancer Paternal Aunt     Prostate cancer Paternal Uncle     Heart disease Paternal Grandfather     Heart disease Paternal Grandmother     Ovarian cancer Neg Hx     Colon cancer Neg Hx     Uterine cancer Neg Hx       Social History     Socioeconomic History    Marital status: Single   Tobacco Use    Smoking status: Every Day     Current packs/day: 1.00     Average packs/day: 1 pack/day for 17.2 years (17.2 ttl pk-yrs)     Types: Cigarettes     Start date: 2008     Passive exposure: Never    Smokeless tobacco: Never    Tobacco comments:     He is aware of consequences of smoking.    Vaping Use    Vaping status: Former    Substances: Nicotine    Devices: Pre-filled or refillable cartridge    Passive vaping exposure: Yes   Substance and Sexual Activity    Alcohol use: No    Drug use: No    Sexual activity: Yes     Partners: Female     Birth control/protection: None         Office Visit on 03/06/2025   Component Date Value Ref Range Status    SARS Antigen 03/06/2025 Detected (A)  Not Detected, Presumptive Negative Final    Influenza A Antigen GARCIA 03/06/2025 Not Detected  Not Detected Final    Influenza B Antigen GARCIA 03/06/2025 Not Detected  Not Detected Final    Internal Control 03/06/2025 Passed  Passed Final    Lot Number 03/06/2025 [id number]   Final    Expiration Date 03/06/2025 11/27/2025   Final    Rapid Strep A Screen 03/06/2025 Negative  Negative, VALID, INVALID, Not Performed Final    Internal Control 03/06/2025 Passed  Passed Final    Lot Number 03/06/2025 [id number]   Final    Expiration Date 03/06/2025 01/08/2026   Final         Physical Exam  Constitutional:       Appearance: Normal appearance.   HENT:      Head: Normocephalic and atraumatic.   Eyes:      General: No scleral icterus.      Extraocular Movements: Extraocular movements intact.   Cardiovascular:      Rate and Rhythm: Normal rate and regular rhythm.      Pulses: Normal pulses.      Heart sounds: Normal heart sounds. No murmur heard.     No friction rub. No gallop.   Pulmonary:      Effort: Pulmonary effort is normal.      Breath sounds: Normal breath sounds. No wheezing, rhonchi or rales.   Abdominal:      General: Abdomen is flat. Bowel sounds are normal.      Palpations: Abdomen is soft.      Tenderness: There is no abdominal tenderness. There is no right CVA tenderness or left CVA tenderness.   Musculoskeletal:      Cervical back: Neck supple.   Skin:     General: Skin is warm and dry.   Neurological:      Mental Status: He is alert. Mental status is at baseline.      Coordination: Coordination normal.      Gait: Gait normal.   Psychiatric:         Mood and Affect: Mood normal.         Behavior: Behavior normal.         Thought Content: Thought content normal.         Judgment: Judgment normal.          Result Review  Data Reviewed:{ Labs  Result Review  Imaging  Med Tab  Media :23}   I have reviewed this patient's chart.  I have reviewed previous labs, previous imaging, previous medications, and previous encounters with notes that were available in this patient's chart.               Assessment and Plan {CC Problem List  Visit Diagnosis  ROS  Review (Popup)  [facility] Maintenance  Quality  BestPractice  Medications  SmartSets  SnapShot Encounters  Media :23}      Elevated blood pressure reading without diagnosis of hypertension  -Blood pressure was more elevated at another medical office today, 132/89 in office here today.  -Recommend try HCTZ or BB - discussed risks and benefits of both and ulitimately decided to try HCTZ.  Discussed the blood pressure may be elevated because of the dental infection and pain, and he may or may not need long-term blood pressure control medications.  I recommeneded he get a blood pressure  cuff for home monitoring, but he said he may be unable to afford this. He has follow up next week with his regular primary care provider in this office, so his blood pressure can be checked again at that time.   Orders:    hydroCHLOROthiazide 25 MG tablet; Take 1 tablet by mouth Daily.    Blood Pressure Monitoring (RA Blood Pressure Cuff Monitor) device; Use 1 each 2 (Two) Times a Day.       -ER red flags discussed with patient including risk versus benefit and education provided.      Follow Up {Instructions Charge Capture  Follow-up Communications :23}     Patient was given instructions and counseling regarding his condition or for health maintenance advice. Please see specific information pulled into the AVS (placed there by myself) if appropriate.    No follow-ups on file.      Sharron Greer PA-C

## 2025-03-19 NOTE — PROGRESS NOTES
"Chief Complaint  No chief complaint on file.    Subjective    History of Present Illness {CC  Problem List  Visit  Diagnosis   Encounters  Notes  Medications  Labs  Result Review Imaging  Media :23}     Costa Watson presents to [facility] for No chief complaint on file..    34-year-old male presents with complaint of cough and congestion.    URI   This is a new problem. The current episode started in the past 7 days. The problem has been rapidly worsening. The maximum temperature recorded prior to his arrival was 100.4 - 100.9 F. Associated symptoms include congestion, coughing, headaches, nausea, sinus pain and a sore throat. Pertinent negatives include no abdominal pain, chest pain, diarrhea, ear pain, rash, vomiting or wheezing. He has tried NSAIDs for the symptoms. The treatment provided mild relief.            Objective     Vital Signs:   There were no vitals taken for this visit.  Current Outpatient Medications on File Prior to Visit   Medication Sig Dispense Refill    atomoxetine (STRATTERA) 40 MG capsule Take 1 capsule by mouth Daily.      brompheniramine-pseudoephedrine-DM 30-2-10 MG/5ML syrup Take 5 mL by mouth 4 (Four) Times a Day As Needed for Cough or Allergies. (Patient not taking: Reported on 11/25/2024) 120 mL 0    busPIRone (BUSPAR) 7.5 MG tablet Take 1 tablet by mouth 2 (Two) Times a Day.      estradiol (Vagifem) 10 MCG tablet vaginal tablet Insert 1 tablet into vagina nightly for 2 weeks and then twice weekly 24 tablet 3    fluticasone (FLONASE) 50 MCG/ACT nasal spray 2 sprays into the nostril(s) as directed by provider Daily. 18.2 g 5    glucose blood test strip 1 each by Other route 2 (Two) Times a Day As Needed (blood sugar monitoring). 90 each 1    glucose monitor monitoring kit Use 1 each Daily. 1 each 1    Lancets Thin misc Use 1 Units Daily for 360 days. 90 each 1    Lybalvi 10-10 MG tablet       Needle, Disp, (B-D BLUNT FILL NEEDLE) 18G X 1-1/2\" " "misc Use 1 each 1 (One) Time Per Week. 50 each 1    Needle, Disp, 20G X 1\" misc Use 1 each 1 (One) Time Per Week. 50 each 1    ondansetron ODT (ZOFRAN-ODT) 8 MG disintegrating tablet Place 1 tablet on the tongue Every 8 (Eight) Hours As Needed for Nausea or Vomiting. 15 tablet 0    sertraline (ZOLOFT) 100 MG tablet Take 2 tablets by mouth Daily.      Syringe 21G X 1\" 3 ML misc Use 1 each 1 (One) Time Per Week. 4 each 2    Syringe, Disposable, (MONOJECT 3CC SYRINGE) 3 ML misc Use 1 each 1 (One) Time Per Week. 4 each 2    Testosterone Cypionate (Depo-Testosterone) 200 MG/ML injection Inject 1 mL into the appropriate muscle as directed by prescriber Every 14 (Fourteen) Days. 2 mL 1    Testosterone Undecanoate (Aveed) 750 MG/3ML solution Inject 3mL into the muscle every 10 weeks 3 mL 1    traZODone (DESYREL) 100 MG tablet Take 1 tablet by mouth Every Night. 30 tablet 1    triamcinolone (KENALOG) 0.1 % ointment Apply 1 Application topically to the appropriate area as directed 2 (Two) Times a Day. 30 g 1    Ventolin  (90 Base) MCG/ACT inhaler Inhale 2 puffs Every 4 (Four) Hours As Needed for Wheezing. 18 g 3     No current facility-administered medications on file prior to visit.        Past Medical History:   Diagnosis Date    Acid reflux     ADHD (attention deficit hyperactivity disorder)     Allergic     Pollen/mold/cats    Anxiety     Arthritis     Asthma     C. difficile colitis     Cyst, sinus nasal     Depression     Diverticulosis     Fibromyalgia     Hilar mass     histoplasmosis    Histoplasmosis     Hx of gastroesophageal reflux (GERD)     Hyperlipidemia     Low back pain     NSTEMI (non-ST elevated myocardial infarction) 09/09/2018    related to histoplasmosis    Obesity     Palpitations     PCOS (polycystic ovarian syndrome)     Schizoaffective disorder       Past Surgical History:   Procedure Laterality Date    CARDIAC CATHETERIZATION      CHOLECYSTECTOMY      laparoscopic    MEDIASTINOSCOPY      "   Family History   Problem Relation Age of Onset    Diabetes Father     Arthritis Father     Alzheimer's disease Mother     Stroke Mother     Osteoporosis Maternal Grandmother     Heart attack Maternal Grandmother         unknown    Heart disease Maternal Grandmother         unknown    Heart attack Maternal Grandfather         unknown    Heart disease Maternal Grandfather         unknown    Breast cancer Paternal Aunt     Prostate cancer Paternal Uncle     Heart disease Paternal Grandfather     Heart disease Paternal Grandmother     Ovarian cancer Neg Hx     Colon cancer Neg Hx     Uterine cancer Neg Hx       Social History     Socioeconomic History    Marital status: Single   Tobacco Use    Smoking status: Every Day     Current packs/day: 1.00     Average packs/day: 1 pack/day for 20.0 years (20.0 ttl pk-yrs)     Types: Cigarettes     Passive exposure: Never    Smokeless tobacco: Never    Tobacco comments:     He is aware of consequences of smoking.    Vaping Use    Vaping status: Former    Substances: Nicotine    Devices: Pre-filled or refillable cartridge    Passive vaping exposure: Yes   Substance and Sexual Activity    Alcohol use: No    Drug use: No    Sexual activity: Yes     Partners: Female     Birth control/protection: None         Office Visit on 03/06/2025   Component Date Value Ref Range Status    SARS Antigen 03/06/2025 Detected (A)  Not Detected, Presumptive Negative Final    Influenza A Antigen GARCIA 03/06/2025 Not Detected  Not Detected Final    Influenza B Antigen GARCIA 03/06/2025 Not Detected  Not Detected Final    Internal Control 03/06/2025 Passed  Passed Final    Lot Number 03/06/2025 [id number]   Final    Expiration Date 03/06/2025 11/27/2025   Final    Rapid Strep A Screen 03/06/2025 Negative  Negative, VALID, INVALID, Not Performed Final    Internal Control 03/06/2025 Passed  Passed Final    Lot Number 03/06/2025 [id number]   Final    Expiration Date 03/06/2025 01/08/2026   Final   Office Visit on  12/13/2024   Component Date Value Ref Range Status    Wound Culture 12/13/2024 Moderate growth (3+) Normal Skin Joycelyn   Final    Gram Stain 12/13/2024 Few (2+) WBCs per low power field   Final    Gram Stain 12/13/2024 Moderate (3+) Mixed bacterial morphotypes seen on Gram Stain   Final    Anaerobic Culture 12/13/2024 Mixed Anaerobic Organisms (A)   Final         Physical Exam  Constitutional:       General: He is not in acute distress.     Appearance: He is ill-appearing.   HENT:      Head: Normocephalic and atraumatic.      Right Ear: Tympanic membrane and ear canal normal.      Left Ear: Tympanic membrane and ear canal normal.      Nose: Congestion present.      Mouth/Throat:      Mouth: Mucous membranes are moist.      Pharynx: Posterior oropharyngeal erythema present. No oropharyngeal exudate.   Eyes:      General:         Right eye: No discharge.         Left eye: No discharge.      Extraocular Movements: Extraocular movements intact.      Conjunctiva/sclera: Conjunctivae normal.      Pupils: Pupils are equal, round, and reactive to light.   Cardiovascular:      Rate and Rhythm: Normal rate and regular rhythm.      Pulses: Normal pulses.      Heart sounds: Normal heart sounds. No murmur heard.     No friction rub. No gallop.   Pulmonary:      Effort: Pulmonary effort is normal. No respiratory distress.      Breath sounds: Normal breath sounds. No wheezing, rhonchi or rales.   Lymphadenopathy:      Cervical: No cervical adenopathy.   Skin:     General: Skin is warm and dry.      Capillary Refill: Capillary refill takes less than 2 seconds.   Neurological:      General: No focal deficit present.      Mental Status: He is alert.   Psychiatric:         Mood and Affect: Mood normal.         Behavior: Behavior normal.         Thought Content: Thought content normal.         Judgment: Judgment normal.          Result Review  Data Reviewed:{ Labs  Result Review  Imaging  Med Tab  Media :23}   I have reviewed this  patient's chart.  I have reviewed previous labs, previous imaging, previous medications, and previous encounters with notes that were available in this patient's chart.               Assessment and Plan {CC Problem List  Visit Diagnosis  ROS  Review (Popup)  Health Maintenance  Quality  BestPractice  Medications  SmartSets  SnapShot Encounters  Media :23}      Acute cough    Orders:    POCT SARS-CoV-2 Antigen GARCIA + Flu    POCT rapid strep A    COVID-19    Orders:    ibuprofen (ADVIL,MOTRIN) 600 MG tablet; Take 1 tablet by mouth Every 8 (Eight) Hours As Needed for Moderate Pain.    Body aches    Orders:    ibuprofen (ADVIL,MOTRIN) 600 MG tablet; Take 1 tablet by mouth Every 8 (Eight) Hours As Needed for Moderate Pain.         -Patient positive for COVID-19.  Discussed risk and benefits of taking Paxlovid for this.  We ultimately decided not to administer this medication.Discussed general measures and symptomatic treatment. Hand hygiene and isolation period recommendations. Increased hydration, healthy diety, and rest. Tylenol or IBU per manufacturers guidelines for fever and pain relief- no IBU if any kidney dysfunction/peptic ulcer. Discussed common OTC medications used for respiratory infection: dextromethorphan for cough, guaifenesin for expectorant, pseudoephedrine for congestion if appropriate/no cardiac concern, antihistamines. Discussed humidifier, and lozenges which could help for cough, congestion, sore throat.    -ER red flags discussed with patient including risk versus benefit and education provided.      Follow Up {Instructions Charge Capture  Follow-up Communications :23}     Patient was given instructions and counseling regarding his condition or for health maintenance advice. Please see specific information pulled into the AVS (placed there by myself) if appropriate.    No follow-ups on file.      Sharron Greer PA-C

## 2025-03-25 ENCOUNTER — OFFICE VISIT (OUTPATIENT)
Dept: FAMILY MEDICINE CLINIC | Facility: CLINIC | Age: 35
End: 2025-03-25
Payer: MEDICAID

## 2025-03-25 VITALS
WEIGHT: 282 LBS | DIASTOLIC BLOOD PRESSURE: 92 MMHG | SYSTOLIC BLOOD PRESSURE: 128 MMHG | OXYGEN SATURATION: 97 % | TEMPERATURE: 96.8 F | HEIGHT: 68 IN | RESPIRATION RATE: 18 BRPM | BODY MASS INDEX: 42.74 KG/M2 | HEART RATE: 96 BPM

## 2025-03-25 DIAGNOSIS — J30.89 ENVIRONMENTAL AND SEASONAL ALLERGIES: ICD-10-CM

## 2025-03-25 DIAGNOSIS — R79.89 LOW SERUM TESTOSTERONE: ICD-10-CM

## 2025-03-25 DIAGNOSIS — Z13.220 SCREENING FOR LIPID DISORDERS: ICD-10-CM

## 2025-03-25 DIAGNOSIS — F64.0 GENDER DYSPHORIA IN ADULT: ICD-10-CM

## 2025-03-25 DIAGNOSIS — E34.9 HORMONE IMBALANCE: ICD-10-CM

## 2025-03-25 DIAGNOSIS — R73.03 PREDIABETES: ICD-10-CM

## 2025-03-25 DIAGNOSIS — I10 PRIMARY HYPERTENSION: Primary | ICD-10-CM

## 2025-03-25 PROCEDURE — 84403 ASSAY OF TOTAL TESTOSTERONE: CPT | Performed by: REGISTERED NURSE

## 2025-03-25 PROCEDURE — 83036 HEMOGLOBIN GLYCOSYLATED A1C: CPT | Performed by: REGISTERED NURSE

## 2025-03-25 PROCEDURE — 80061 LIPID PANEL: CPT | Performed by: REGISTERED NURSE

## 2025-03-25 PROCEDURE — 84402 ASSAY OF FREE TESTOSTERONE: CPT | Performed by: REGISTERED NURSE

## 2025-03-25 PROCEDURE — 85025 COMPLETE CBC W/AUTO DIFF WBC: CPT | Performed by: REGISTERED NURSE

## 2025-03-25 PROCEDURE — 80053 COMPREHEN METABOLIC PANEL: CPT | Performed by: REGISTERED NURSE

## 2025-03-25 RX ORDER — AMLODIPINE BESYLATE 5 MG/1
5 TABLET ORAL DAILY
Qty: 90 TABLET | Refills: 1 | Status: SHIPPED | OUTPATIENT
Start: 2025-03-25

## 2025-03-25 RX ORDER — FLUTICASONE PROPIONATE 50 MCG
2 SPRAY, SUSPENSION (ML) NASAL DAILY
Qty: 18.2 G | Refills: 3 | Status: SHIPPED | OUTPATIENT
Start: 2025-03-25

## 2025-03-25 RX ORDER — LORATADINE 10 MG/1
10 TABLET ORAL DAILY PRN
Qty: 30 TABLET | Refills: 2 | Status: SHIPPED | OUTPATIENT
Start: 2025-03-25

## 2025-03-25 NOTE — PROGRESS NOTES
Venipuncture Blood Specimen Collection  Venipuncture performed in the right arm by Valeria Knapp MA with good hemostasis. Patient tolerated the procedure well without complications.   03/25/25   Valeria Knapp MA

## 2025-03-25 NOTE — PROGRESS NOTES
Chief Complaint  Hypertension (Running 136/100 120/90 this morning) and Hyperglycemia (Last night 196, this morning 119 fasting.  Pt is still fasting)    Subjective    History of Present Illness {CC  Problem List  Visit  Diagnosis   Encounters  Notes  Medications  Labs  Result Review Imaging  Media :23}     Costa Watson presents to [facility] for Hypertension (Running 136/100 120/90 this morning) and Hyperglycemia (Last night 196, this morning 119 fasting.  Pt is still fasting).      History of Present Illness  Patient is a 34 y.o. transgender male who presents to the clinic today for 2-week follow-up for hypertension, hyperglycemia, and environmental/seasonal allergies.  Patient denies any chest pain, shortness of breath, or any fevers.  Patient denies any known exposure to COVID, flu, or any other contagious illnesses.       History of Present Illness  In regards to hypertension, patient has been experiencing elevated blood pressure readings for the past few months. He has not previously been prescribed either amlodipine or lisinopril.  He was recently prescribed hydrochlorothiazide last month but his blood pressures at home are still remaining between  diastolically.  Today's pressure was 128/92.  Patient shares that his blood pressure has not been controlled at home.  We discussed adding amlodipine on board for better control.    In regards to environmental and seasonal allergies, patient shares that he has a history of nasal polyps.  Upon investigation, patient's nares on both sides were very inflamed and narrowed in their opening.  I would highly recommend patient take Flonase daily and Claritin to help reduce the histamine response and have better flow of breathing.    Additionally, he reports an increase in his blood sugar levels, with a reading of 196 recorded yesterday. He has not yet implemented any dietary modifications to manage this  "condition.    Supplemental Information  He is currently on amoxicillin for a dental infection. He was supposed to get a root canal last week, but it was postponed due to the infection and high blood pressure. He goes to [facility] for his orthodontist.    MEDICATIONS  Current: hydrochlorothiazide, amoxicillin       Review of Systems   Constitutional: Negative.  Negative for activity change, chills, fatigue and fever.   HENT: Negative.  Negative for congestion, dental problem, ear pain, hearing loss, rhinorrhea, sinus pain, sore throat, tinnitus and trouble swallowing.    Eyes: Negative.  Negative for pain and visual disturbance.   Respiratory: Negative.  Negative for cough, chest tightness, shortness of breath and wheezing.    Cardiovascular: Negative.  Negative for chest pain, palpitations and leg swelling.   Gastrointestinal: Negative.  Negative for abdominal pain, diarrhea, nausea and vomiting.   Endocrine: Negative.  Negative for polydipsia, polyphagia and polyuria.   Genitourinary: Negative.  Negative for difficulty urinating, dysuria, frequency and urgency.   Musculoskeletal: Negative.  Negative for arthralgias, back pain and myalgias.   Skin: Negative.  Negative for color change, pallor, rash and wound.   Allergic/Immunologic: Positive for environmental allergies.   Neurological: Negative.  Negative for dizziness, speech difficulty, weakness, light-headedness, numbness and headaches.   Hematological: Negative.    Psychiatric/Behavioral: Negative.  Negative for confusion, decreased concentration, self-injury and suicidal ideas. The patient is not nervous/anxious.    All other systems reviewed and are negative.       Objective     Vital Signs:   /92 (BP Location: Left arm, Patient Position: Sitting, Cuff Size: Adult)   Pulse 96   Temp 96.8 °F (36 °C) (Temporal)   Resp 18   Ht 172.7 cm (67.99\")   Wt 128 kg (282 lb)   SpO2 97%   BMI 42.89 kg/m²   Current Outpatient Medications on File " "Prior to Visit   Medication Sig Dispense Refill    Blood Pressure Monitoring (RA Blood Pressure Cuff Monitor) device Use 1 each 2 (Two) Times a Day. 1 each 0    Cariprazine HCl (VRAYLAR) 3 MG capsule capsule Take 4.5 mg by mouth Daily.      glucose blood test strip 1 each by Other route 2 (Two) Times a Day As Needed (blood sugar monitoring). 90 each 1    glucose monitor monitoring kit Use 1 each Daily. 1 each 1    hydroCHLOROthiazide 25 MG tablet Take 1 tablet by mouth Daily. 30 tablet 0    ibuprofen (ADVIL,MOTRIN) 600 MG tablet Take 1 tablet by mouth Every 8 (Eight) Hours As Needed for Moderate Pain. 90 tablet 0    Jornay PM 20 MG capsule sustained-release 24 hr Take 1 capsule by mouth Every Night.      mirtazapine (REMERON) 15 MG tablet Take 1 tablet by mouth Every Night.      ondansetron ODT (ZOFRAN-ODT) 8 MG disintegrating tablet Place 1 tablet on the tongue Every 8 (Eight) Hours As Needed for Nausea or Vomiting. 15 tablet 0    oxybutynin XL (DITROPAN XL) 15 MG 24 hr tablet Take 1 tablet by mouth Daily.      sertraline (ZOLOFT) 100 MG tablet Take 1.5 tablets by mouth Daily.      Testosterone Cypionate (Depo-Testosterone) 200 MG/ML injection Inject 1 mL into the appropriate muscle as directed by prescriber Every 14 (Fourteen) Days. 2 mL 1    Ventolin  (90 Base) MCG/ACT inhaler Inhale 2 puffs Every 4 (Four) Hours As Needed for Wheezing. 18 g 3    Needle, Disp, (B-D BLUNT FILL NEEDLE) 18G X 1-1/2\" misc Use 1 each 1 (One) Time Per Week. (Patient not taking: Reported on 3/25/2025) 50 each 1    Needle, Disp, 20G X 1\" misc Use 1 each 1 (One) Time Per Week. (Patient not taking: Reported on 3/25/2025) 50 each 1    Syringe 21G X 1\" 3 ML misc Use 1 each 1 (One) Time Per Week. (Patient not taking: Reported on 3/25/2025) 4 each 2    Syringe, Disposable, (MONOJECT 3CC SYRINGE) 3 ML misc Use 1 each 1 (One) Time Per Week. (Patient not taking: Reported on 3/25/2025) 4 each 2    [DISCONTINUED] " brompheniramine-pseudoephedrine-DM 30-2-10 MG/5ML syrup Take 10 mL by mouth 4 (Four) Times a Day As Needed for Cough or Congestion. (Patient not taking: Reported on 3/25/2025) 118 mL 0    [DISCONTINUED] busPIRone (BUSPAR) 7.5 MG tablet Take 1 tablet by mouth 2 (Two) Times a Day. (Patient not taking: Reported on 3/25/2025)      [DISCONTINUED] Lancets Thin misc Use 1 Units Daily for 360 days. (Patient not taking: Reported on 3/25/2025) 90 each 1    [DISCONTINUED] Testosterone Undecanoate (Aveed) 750 MG/3ML solution Inject 3mL into the muscle every 10 weeks (Patient not taking: Reported on 3/25/2025) 3 mL 1    [DISCONTINUED] triamcinolone (KENALOG) 0.1 % ointment Apply 1 Application topically to the appropriate area as directed 2 (Two) Times a Day. (Patient not taking: Reported on 3/25/2025) 30 g 1     No current facility-administered medications on file prior to visit.        Past Medical History:   Diagnosis Date    Acid reflux     ADHD (attention deficit hyperactivity disorder)     Allergic     Pollen/mold/cats    Anxiety     Arthritis     Asthma     C. difficile colitis     Cyst, sinus nasal     Depression     Diverticulosis     Fibromyalgia     Hilar mass     histoplasmosis    Histoplasmosis     Hx of gastroesophageal reflux (GERD)     Hyperlipidemia     Low back pain     NSTEMI (non-ST elevated myocardial infarction) 09/09/2018    related to histoplasmosis    Obesity     Palpitations     PCOS (polycystic ovarian syndrome)     Polycystic ovary syndrome     Schizoaffective disorder     Varicella       Past Surgical History:   Procedure Laterality Date    CARDIAC CATHETERIZATION      CHOLECYSTECTOMY      laparoscopic    LAPAROSCOPIC CHOLECYSTECTOMY      MEDIASTINOSCOPY        Family History   Problem Relation Age of Onset    Diabetes Father     Arthritis Father     Alzheimer's disease Mother     Stroke Mother     Osteoporosis Maternal Grandmother     Heart attack Maternal Grandmother         unknown    Heart disease  Maternal Grandmother         unknown    Heart attack Maternal Grandfather         unknown    Heart disease Maternal Grandfather         unknown    Breast cancer Paternal Aunt     Prostate cancer Paternal Uncle     Heart disease Paternal Grandfather     Heart disease Paternal Grandmother     Cancer Paternal Aunt     Ovarian cancer Neg Hx     Colon cancer Neg Hx     Uterine cancer Neg Hx       Social History     Socioeconomic History    Marital status: Single   Tobacco Use    Smoking status: Every Day     Current packs/day: 1.00     Average packs/day: 1 pack/day for 17.2 years (17.2 ttl pk-yrs)     Types: Cigarettes     Start date: 2008     Passive exposure: Never    Smokeless tobacco: Never    Tobacco comments:     He is aware of consequences of smoking.    Vaping Use    Vaping status: Former    Substances: Nicotine    Devices: Pre-filled or refillable cartridge    Passive vaping exposure: Yes   Substance and Sexual Activity    Alcohol use: No    Drug use: No    Sexual activity: Yes     Partners: Female     Birth control/protection: None         Office Visit on 03/06/2025   Component Date Value Ref Range Status    SARS Antigen 03/06/2025 Detected (A)  Not Detected, Presumptive Negative Final    Influenza A Antigen GARCIA 03/06/2025 Not Detected  Not Detected Final    Influenza B Antigen GARCIA 03/06/2025 Not Detected  Not Detected Final    Internal Control 03/06/2025 Passed  Passed Final    Lot Number 03/06/2025 [id number]   Final    Expiration Date 03/06/2025 11/27/2025   Final    Rapid Strep A Screen 03/06/2025 Negative  Negative, VALID, INVALID, Not Performed Final    Internal Control 03/06/2025 Passed  Passed Final    Lot Number 03/06/2025 [id number]   Final    Expiration Date 03/06/2025 01/08/2026   Final         Physical Exam  Vitals and nursing note reviewed.   Constitutional:       Appearance: Normal appearance. He is normal weight.   HENT:      Head: Normocephalic and atraumatic.      Nose:      Right Turbinates:  Enlarged and swollen.      Left Turbinates: Enlarged and swollen.   Cardiovascular:      Rate and Rhythm: Normal rate and regular rhythm.      Pulses: Normal pulses.      Heart sounds: Normal heart sounds. No murmur heard.     No friction rub. No gallop.   Pulmonary:      Effort: Pulmonary effort is normal. No respiratory distress.      Breath sounds: Normal breath sounds. No stridor. No wheezing, rhonchi or rales.   Chest:      Chest wall: No tenderness.   Abdominal:      General: Abdomen is flat. Bowel sounds are normal. There is no distension.      Palpations: Abdomen is soft. There is no mass.      Tenderness: There is no abdominal tenderness. There is no right CVA tenderness, left CVA tenderness, guarding or rebound.      Hernia: No hernia is present.   Skin:     General: Skin is warm and dry.      Capillary Refill: Capillary refill takes less than 2 seconds.      Coloration: Skin is not jaundiced or pale.   Neurological:      General: No focal deficit present.      Mental Status: He is alert and oriented to person, place, and time. Mental status is at baseline.      Motor: No weakness.      Coordination: Coordination normal.      Gait: Gait normal.   Psychiatric:         Mood and Affect: Mood normal.         Behavior: Behavior normal.         Thought Content: Thought content normal.         Judgment: Judgment normal.          Physical Exam         Result Review  Data Reviewed:{ Labs  Result Review  Imaging  Med Tab  Media :23}   I have reviewed this patient's chart.  I have reviewed previous labs, previous imaging, previous medications, and previous encounters with notes that were available in this patient's chart.    Results  Laboratory Studies  Blood sugar was 196.              Assessment and Plan {CC Problem List  Visit Diagnosis  ROS  Review (Popup)  [facility]  Quality  BestPractice  Medications  SmartSets  SnapShot Encounters  Media :23}   Diagnoses and all orders for this  visit:    1. Primary hypertension (Primary)  -     amLODIPine (NORVASC) 5 MG tablet; Take 1 tablet by mouth Daily.  Dispense: 90 tablet; Refill: 1  -     CBC & Differential  -     Comprehensive Metabolic Panel    2. Hormone imbalance  -     Testosterone (Free & Total), LC / MS    3. Gender dysphoria in adult  -     Testosterone (Free & Total), LC / MS    4. Low serum testosterone  -     Testosterone (Free & Total), LC / MS    5. Prediabetes  -     Hemoglobin A1c    6. Screening for lipid disorders  -     Lipid Panel    7. Environmental and seasonal allergies  -     fluticasone (FLONASE) 50 MCG/ACT nasal spray; Administer 2 sprays into the nostril(s) as directed by provider Daily.  Dispense: 18.2 g; Refill: 3  -     loratadine (Claritin) 10 MG tablet; Take 1 tablet by mouth Daily As Needed for Allergies.  Dispense: 30 tablet; Refill: 2        Assessment & Plan  1. Hypertension.  His diastolic blood pressure has consistently exceeded 100, with today's reading in the 90s. A prescription for amlodipine 5 mg has been issued to [facility] pharmacy. He is advised to monitor his blood pressure daily for the next 2 weeks, preferably in the morning. Educational materials on amlodipine have been provided. He should continue taking hydrochlorothiazide as the diuretic alone has proven insufficient. The potential side effects of amlodipine, including hypotension, were discussed. He is advised to maintain a consistent medication regimen and avoid skipping doses unless necessary.    2. Prediabetes.  His recent lab results indicate a prediabetic state, although he remains within the non-diabetic range. He is advised to adopt a low-carbohydrate diet to manage his blood glucose levels. Educational materials on carbohydrate counting have been provided. A comprehensive set of laboratory tests, including cholesterol, CMP, testosterone, CBC, and cholesterol check, will be conducted today.    3.  Allergies  Per environmental and seasonal  allergies I would recommend Flonase daily and Claritin daily during the worst part of the season.    Follow-up  The patient is scheduled for a follow-up visit in 4 weeks to monitor his hypertension.       -Check blood pressure daily and bring back in 2 weeks for nurse recheck.  -ER red flags discussed with patient including risk versus benefit and education provided.  -Follow-up with me in 4 weeks or sooner if needed.    I spent 30 minutes caring for Costa on this date of service. This time includes time spent by me in the following activities:preparing for the visit, reviewing tests, obtaining and/or reviewing a separately obtained history, performing a medically appropriate examination and/or evaluation , counseling and educating the patient/family/caregiver, ordering medications, tests, or procedures, referring and communicating with other health care professionals , documenting information in the medical record, independently interpreting results and communicating that information with the patient/family/caregiver, and care coordination.    Follow Up {Instructions Charge Capture  Follow-up Communications :23}     Patient was given instructions and counseling regarding his condition or for health maintenance advice. Please see specific information pulled into the AVS (placed there by myself) if appropriate.    Return in about 4 weeks (around 4/22/2025) for Recheck hypertension.    Class 3 Severe Obesity (BMI >=40). Obesity-related health conditions include the following: hypertension. Obesity is unchanged. BMI is is above average; BMI management plan is completed. We discussed portion control and increasing exercise.         RAHUL Ayoub, [facility]      Patient or patient representative verbalized consent for the use of Ambient Listening during the visit with  RAHUL Ayoub for chart documentation. 3/25/2025  10:01 EDT

## 2025-03-26 LAB
ALBUMIN SERPL-MCNC: 4 G/DL (ref 3.5–5.2)
ALBUMIN/GLOB SERPL: 1.3 G/DL
ALP SERPL-CCNC: 92 U/L (ref 39–117)
ALT SERPL W P-5'-P-CCNC: 51 U/L (ref 1–33)
ANION GAP SERPL CALCULATED.3IONS-SCNC: 10.6 MMOL/L (ref 5–15)
AST SERPL-CCNC: 29 U/L (ref 1–32)
BASOPHILS # BLD AUTO: 0.08 10*3/MM3 (ref 0–0.2)
BASOPHILS NFR BLD AUTO: 1 % (ref 0–1.5)
BILIRUB SERPL-MCNC: 0.2 MG/DL (ref 0–1.2)
BUN SERPL-MCNC: 11 MG/DL (ref 6–20)
BUN/CREAT SERPL: 11.1 (ref 7–25)
CALCIUM SPEC-SCNC: 9.8 MG/DL (ref 8.6–10.5)
CHLORIDE SERPL-SCNC: 101 MMOL/L (ref 98–107)
CHOLEST SERPL-MCNC: 223 MG/DL (ref 0–200)
CO2 SERPL-SCNC: 25.4 MMOL/L (ref 22–29)
CREAT SERPL-MCNC: 0.99 MG/DL (ref 0.57–1)
DEPRECATED RDW RBC AUTO: 47.2 FL (ref 37–54)
EGFRCR SERPLBLD CKD-EPI 2021: 77.9 ML/MIN/1.73
EOSINOPHIL # BLD AUTO: 0.47 10*3/MM3 (ref 0–0.4)
EOSINOPHIL NFR BLD AUTO: 5.6 % (ref 0.3–6.2)
ERYTHROCYTE [DISTWIDTH] IN BLOOD BY AUTOMATED COUNT: 13.9 % (ref 12.3–15.4)
GLOBULIN UR ELPH-MCNC: 3.2 GM/DL
GLUCOSE SERPL-MCNC: 103 MG/DL (ref 65–99)
HBA1C MFR BLD: 5.6 % (ref 4.8–5.6)
HCT VFR BLD AUTO: 49.1 % (ref 34–46.6)
HDLC SERPL-MCNC: 33 MG/DL (ref 40–60)
HGB BLD-MCNC: 16.9 G/DL (ref 12–15.9)
IMM GRANULOCYTES # BLD AUTO: 0.01 10*3/MM3 (ref 0–0.05)
IMM GRANULOCYTES NFR BLD AUTO: 0.1 % (ref 0–0.5)
LDLC SERPL CALC-MCNC: 139 MG/DL (ref 0–100)
LDLC/HDLC SERPL: 4.04 {RATIO}
LYMPHOCYTES # BLD AUTO: 2.59 10*3/MM3 (ref 0.7–3.1)
LYMPHOCYTES NFR BLD AUTO: 30.8 % (ref 19.6–45.3)
MCH RBC QN AUTO: 31.8 PG (ref 26.6–33)
MCHC RBC AUTO-ENTMCNC: 34.4 G/DL (ref 31.5–35.7)
MCV RBC AUTO: 92.5 FL (ref 79–97)
MONOCYTES # BLD AUTO: 0.5 10*3/MM3 (ref 0.1–0.9)
MONOCYTES NFR BLD AUTO: 6 % (ref 5–12)
NEUTROPHILS NFR BLD AUTO: 4.75 10*3/MM3 (ref 1.7–7)
NEUTROPHILS NFR BLD AUTO: 56.5 % (ref 42.7–76)
NRBC BLD AUTO-RTO: 0 /100 WBC (ref 0–0.2)
PLATELET # BLD AUTO: 226 10*3/MM3 (ref 140–450)
PMV BLD AUTO: 11.6 FL (ref 6–12)
POTASSIUM SERPL-SCNC: 3.8 MMOL/L (ref 3.5–5.2)
PROT SERPL-MCNC: 7.2 G/DL (ref 6–8.5)
RBC # BLD AUTO: 5.31 10*6/MM3 (ref 3.77–5.28)
SODIUM SERPL-SCNC: 137 MMOL/L (ref 136–145)
TRIGL SERPL-MCNC: 283 MG/DL (ref 0–150)
VLDLC SERPL-MCNC: 51 MG/DL (ref 5–40)
WBC NRBC COR # BLD AUTO: 8.4 10*3/MM3 (ref 3.4–10.8)

## 2025-04-01 LAB
TESTOST FREE SERPL-MCNC: 14.5 PG/ML (ref 0–4.2)
TESTOST SERPL-MCNC: 502.9 NG/DL (ref 10–55)

## 2025-04-02 ENCOUNTER — OFFICE VISIT (OUTPATIENT)
Dept: FAMILY MEDICINE CLINIC | Facility: CLINIC | Age: 35
End: 2025-04-02
Payer: MEDICAID

## 2025-04-02 VITALS
RESPIRATION RATE: 18 BRPM | BODY MASS INDEX: 42.74 KG/M2 | WEIGHT: 282 LBS | HEART RATE: 101 BPM | DIASTOLIC BLOOD PRESSURE: 82 MMHG | SYSTOLIC BLOOD PRESSURE: 113 MMHG | OXYGEN SATURATION: 96 % | HEIGHT: 68 IN | TEMPERATURE: 98.3 F

## 2025-04-02 DIAGNOSIS — M25.561 ACUTE PAIN OF RIGHT KNEE: ICD-10-CM

## 2025-04-02 DIAGNOSIS — J45.909 MODERATE ASTHMA, UNSPECIFIED WHETHER COMPLICATED, UNSPECIFIED WHETHER PERSISTENT: ICD-10-CM

## 2025-04-02 DIAGNOSIS — S69.91XA WRIST INJURY, RIGHT, INITIAL ENCOUNTER: ICD-10-CM

## 2025-04-02 DIAGNOSIS — S69.91XA WRIST INJURY, RIGHT, INITIAL ENCOUNTER: Primary | ICD-10-CM

## 2025-04-02 NOTE — PROGRESS NOTES
Chief Complaint  Knee Pain (Right knee has been painful and feels like walking on airbag about 4 days) and Wrist Pain (Thinks right wrist is sprained.  Fell and when catching all weight went on wrist and flexed it.  No pop was heard. Occurred last night)    Subjective    History of Present Illness {CC  Problem List  Visit  Diagnosis   Encounters  Notes  Medications  Labs  Result Review Imaging  Media :23}     Costa Watson presents to [facility] for Knee Pain (Right knee has been painful and feels like walking on airbag about 4 days) and Wrist Pain (Thinks right wrist is sprained.  Fell and when catching all weight went on wrist and flexed it.  No pop was heard. Occurred last night).      History of Present Illness  Patient is a 34 y.o. transgender male who presents to the clinic today for concerns of right knee pain x 4 days, right wrist injury from fall x 1 day, and chronic worsening asthma.  Patient denies any chest pain, shortness of breath, or any fevers.  Patient denies any known exposure to COVID, flu, or any other contagious illnesses.       History of Present Illness  In regards to right knee pain, approximately 4 days ago, an unusual sensation in the right knee began, described as akin to walking on airbags, accompanied by pain upon applying pressure. No specific injury has been identified as the cause. Swelling is noted on both the right and left sides of the kneecap.  Patient would like to move forward with getting an x-ray of his knee.    In regards to right wrist pain, the patient experienced a fall last night while attempting to rise from bed, during which he placed his full weight on his arm, resulting in a backward fall due to the instability of his wrist. Significant pain continues, even with minor tasks such as lifting a pillow. Attempts to alleviate the discomfort through stretching exercises have been made, but bending down remains painful.  Would like to  move forward with getting imaging of his wrist to verify there is no fracture or other malalignment issues.    In regards to chronic asthma, patient reports daily use of the Ventolin inhaler, exclusively at night, is reported, with consideration for an alternative treatment. A history of undiagnosed asthma for several years is noted. The patient cohabitates with dogs, to which no known allergies exist, but currently has a mouse infestation in the home.  Patient shares that he is willing to try Trelegy.  But insurance did not cover Trelegy so Symbicort has been ordered.         Review of Systems   Constitutional: Negative.  Negative for activity change, chills, fatigue and fever.   HENT: Negative.  Negative for congestion, dental problem, ear pain, hearing loss, rhinorrhea, sinus pain, sore throat, tinnitus and trouble swallowing.    Eyes: Negative.  Negative for pain and visual disturbance.   Respiratory: Negative.  Negative for cough, chest tightness, shortness of breath and wheezing.    Cardiovascular: Negative.  Negative for chest pain, palpitations and leg swelling.   Gastrointestinal: Negative.  Negative for abdominal pain, diarrhea, nausea and vomiting.   Endocrine: Negative.  Negative for polydipsia, polyphagia and polyuria.   Genitourinary: Negative.  Negative for difficulty urinating, dysuria, frequency and urgency.   Musculoskeletal:  Positive for arthralgias. Negative for back pain and myalgias.   Skin: Negative.  Negative for color change, pallor, rash and wound.   Allergic/Immunologic: Negative.  Negative for environmental allergies.   Neurological: Negative.  Negative for dizziness, speech difficulty, weakness, light-headedness, numbness and headaches.   Hematological: Negative.    Psychiatric/Behavioral: Negative.  Negative for confusion, decreased concentration, self-injury and suicidal ideas. The patient is not nervous/anxious.    All other systems reviewed and are negative.       Objective    "  Vital Signs:   /82 (BP Location: Right arm, Patient Position: Sitting, Cuff Size: Adult)   Pulse 101   Temp 98.3 °F (36.8 °C) (Temporal)   Resp 18   Ht 172.7 cm (67.99\")   Wt 128 kg (282 lb)   SpO2 96%   BMI 42.89 kg/m²   Current Outpatient Medications on File Prior to Visit   Medication Sig Dispense Refill    Blood Pressure Monitoring (RA Blood Pressure Cuff Monitor) device Use 1 each 2 (Two) Times a Day. 1 each 0    Cariprazine HCl (VRAYLAR) 3 MG capsule capsule Take 4.5 mg by mouth Daily.      fluticasone (FLONASE) 50 MCG/ACT nasal spray Administer 2 sprays into the nostril(s) as directed by provider Daily. 18.2 g 3    glucose blood test strip 1 each by Other route 2 (Two) Times a Day As Needed (blood sugar monitoring). 90 each 1    glucose monitor monitoring kit Use 1 each Daily. 1 each 1    ibuprofen (ADVIL,MOTRIN) 600 MG tablet Take 1 tablet by mouth Every 8 (Eight) Hours As Needed for Moderate Pain. 90 tablet 0    Jornay PM 20 MG capsule sustained-release 24 hr Take 1 capsule by mouth Every Night.      loratadine (Claritin) 10 MG tablet Take 1 tablet by mouth Daily As Needed for Allergies. 30 tablet 2    mirtazapine (REMERON) 15 MG tablet Take 1 tablet by mouth Every Night.      Needle, Disp, (B-D BLUNT FILL NEEDLE) 18G X 1-1/2\" misc Use 1 each 1 (One) Time Per Week. 50 each 1    Needle, Disp, 20G X 1\" misc Use 1 each 1 (One) Time Per Week. 50 each 1    ondansetron ODT (ZOFRAN-ODT) 8 MG disintegrating tablet Place 1 tablet on the tongue Every 8 (Eight) Hours As Needed for Nausea or Vomiting. 15 tablet 0    oxybutynin XL (DITROPAN XL) 15 MG 24 hr tablet Take 1 tablet by mouth Daily. (Patient not taking: Reported on 4/22/2025)      sertraline (ZOLOFT) 100 MG tablet Take 1.5 tablets by mouth Daily.      Syringe 21G X 1\" 3 ML misc Use 1 each 1 (One) Time Per Week. 4 each 2    Syringe, Disposable, (MONOJECT 3CC SYRINGE) 3 ML misc Use 1 each 1 (One) Time Per Week. 4 each 2    Testosterone Cypionate " (Depo-Testosterone) 200 MG/ML injection Inject 1 mL into the appropriate muscle as directed by prescriber Every 14 (Fourteen) Days. 2 mL 1    Ventolin  (90 Base) MCG/ACT inhaler Inhale 2 puffs Every 4 (Four) Hours As Needed for Wheezing. 18 g 3    [DISCONTINUED] amLODIPine (NORVASC) 5 MG tablet Take 1 tablet by mouth Daily. 90 tablet 1    [DISCONTINUED] hydroCHLOROthiazide 25 MG tablet Take 1 tablet by mouth Daily. 30 tablet 0     No current facility-administered medications on file prior to visit.        Past Medical History:   Diagnosis Date    Acid reflux     ADHD (attention deficit hyperactivity disorder)     Allergic     Pollen/mold/cats    Anxiety     Arthritis     Asthma     C. difficile colitis     Cyst, sinus nasal     Depression     Diverticulosis     Fibromyalgia     Hilar mass     histoplasmosis    Histoplasmosis     Hx of gastroesophageal reflux (GERD)     Hyperlipidemia     Low back pain     NSTEMI (non-ST elevated myocardial infarction) 09/09/2018    related to histoplasmosis    Obesity     Palpitations     PCOS (polycystic ovarian syndrome)     Polycystic ovary syndrome     Schizoaffective disorder     Varicella       Past Surgical History:   Procedure Laterality Date    CARDIAC CATHETERIZATION      CHOLECYSTECTOMY      laparoscopic    LAPAROSCOPIC CHOLECYSTECTOMY      MEDIASTINOSCOPY        Family History   Problem Relation Age of Onset    Diabetes Father     Arthritis Father     Alzheimer's disease Mother     Stroke Mother     Osteoporosis Maternal Grandmother     Heart attack Maternal Grandmother         unknown    Heart disease Maternal Grandmother         unknown    Heart attack Maternal Grandfather         unknown    Heart disease Maternal Grandfather         unknown    Breast cancer Paternal Aunt     Prostate cancer Paternal Uncle     Heart disease Paternal Grandfather     Heart disease Paternal Grandmother     Cancer Paternal Aunt     Ovarian cancer Neg Hx     Colon cancer Neg Hx      Uterine cancer Neg Hx       Social History     Socioeconomic History    Marital status: Single   Tobacco Use    Smoking status: Every Day     Current packs/day: 1.00     Average packs/day: 1 pack/day for 17.3 years (17.3 ttl pk-yrs)     Types: Cigarettes     Start date: 2008     Passive exposure: Never    Smokeless tobacco: Never    Tobacco comments:     He is aware of consequences of smoking.    Vaping Use    Vaping status: Former    Substances: Nicotine    Devices: Pre-filled or refillable cartridge    Passive vaping exposure: Yes   Substance and Sexual Activity    Alcohol use: No    Drug use: No    Sexual activity: Yes     Partners: Female     Birth control/protection: None         Office Visit on 03/25/2025   Component Date Value Ref Range Status    Glucose 03/25/2025 103 (H)  65 - 99 mg/dL Final    BUN 03/25/2025 11  6 - 20 mg/dL Final    Creatinine 03/25/2025 0.99  0.57 - 1.00 mg/dL Final    Sodium 03/25/2025 137  136 - 145 mmol/L Final    Potassium 03/25/2025 3.8  3.5 - 5.2 mmol/L Final    Chloride 03/25/2025 101  98 - 107 mmol/L Final    CO2 03/25/2025 25.4  22.0 - 29.0 mmol/L Final    Calcium 03/25/2025 9.8  8.6 - 10.5 mg/dL Final    Total Protein 03/25/2025 7.2  6.0 - 8.5 g/dL Final    Albumin 03/25/2025 4.0  3.5 - 5.2 g/dL Final    ALT (SGPT) 03/25/2025 51 (H)  1 - 33 U/L Final    AST (SGOT) 03/25/2025 29  1 - 32 U/L Final    Alkaline Phosphatase 03/25/2025 92  39 - 117 U/L Final    Total Bilirubin 03/25/2025 0.2  0.0 - 1.2 mg/dL Final    Globulin 03/25/2025 3.2  gm/dL Final    A/G Ratio 03/25/2025 1.3  g/dL Final    BUN/Creatinine Ratio 03/25/2025 11.1  7.0 - 25.0 Final    Anion Gap 03/25/2025 10.6  5.0 - 15.0 mmol/L Final    eGFR 03/25/2025 77.9  >60.0 mL/min/1.73 Final    Total Cholesterol 03/25/2025 223 (H)  0 - 200 mg/dL Final    Triglycerides 03/25/2025 283 (H)  0 - 150 mg/dL Final    HDL Cholesterol 03/25/2025 33 (L)  40 - 60 mg/dL Final    LDL Cholesterol  03/25/2025 139 (H)  0 - 100 mg/dL Final     VLDL Cholesterol 03/25/2025 51 (H)  5 - 40 mg/dL Final    LDL/HDL Ratio 03/25/2025 4.04   Final    Hemoglobin A1C 03/25/2025 5.60  4.80 - 5.60 % Final    Testosterone, Total 03/25/2025 502.9 (H)  10.0 - 55.0 ng/dL Final    **Verified by repeat analysis**    Testosterone, Free 03/25/2025 14.5 (H)  0.0 - 4.2 pg/mL Final    WBC 03/25/2025 8.40  3.40 - 10.80 10*3/mm3 Final    RBC 03/25/2025 5.31 (H)  3.77 - 5.28 10*6/mm3 Final    Hemoglobin 03/25/2025 16.9 (H)  12.0 - 15.9 g/dL Final    Hematocrit 03/25/2025 49.1 (H)  34.0 - 46.6 % Final    MCV 03/25/2025 92.5  79.0 - 97.0 fL Final    MCH 03/25/2025 31.8  26.6 - 33.0 pg Final    MCHC 03/25/2025 34.4  31.5 - 35.7 g/dL Final    RDW 03/25/2025 13.9  12.3 - 15.4 % Final    RDW-SD 03/25/2025 47.2  37.0 - 54.0 fl Final    MPV 03/25/2025 11.6  6.0 - 12.0 fL Final    Platelets 03/25/2025 226  140 - 450 10*3/mm3 Final    Neutrophil % 03/25/2025 56.5  42.7 - 76.0 % Final    Lymphocyte % 03/25/2025 30.8  19.6 - 45.3 % Final    Monocyte % 03/25/2025 6.0  5.0 - 12.0 % Final    Eosinophil % 03/25/2025 5.6  0.3 - 6.2 % Final    Basophil % 03/25/2025 1.0  0.0 - 1.5 % Final    Immature Grans % 03/25/2025 0.1  0.0 - 0.5 % Final    Neutrophils, Absolute 03/25/2025 4.75  1.70 - 7.00 10*3/mm3 Final    Lymphocytes, Absolute 03/25/2025 2.59  0.70 - 3.10 10*3/mm3 Final    Monocytes, Absolute 03/25/2025 0.50  0.10 - 0.90 10*3/mm3 Final    Eosinophils, Absolute 03/25/2025 0.47 (H)  0.00 - 0.40 10*3/mm3 Final    Basophils, Absolute 03/25/2025 0.08  0.00 - 0.20 10*3/mm3 Final    Immature Grans, Absolute 03/25/2025 0.01  0.00 - 0.05 10*3/mm3 Final    nRBC 03/25/2025 0.0  0.0 - 0.2 /100 WBC Final   Office Visit on 03/06/2025   Component Date Value Ref Range Status    SARS Antigen 03/06/2025 Detected (A)  Not Detected, Presumptive Negative Final    Influenza A Antigen GARCIA 03/06/2025 Not Detected  Not Detected Final    Influenza B Antigen GARCIA 03/06/2025 Not Detected  Not Detected Final    Internal  Control 03/06/2025 Passed  Passed Final    Lot Number 03/06/2025 [id number]   Final    Expiration Date 03/06/2025 11/27/2025   Final    Rapid Strep A Screen 03/06/2025 Negative  Negative, VALID, INVALID, Not Performed Final    Internal Control 03/06/2025 Passed  Passed Final    Lot Number 03/06/2025 [id number]   Final    Expiration Date 03/06/2025 01/08/2026   Final         Physical Exam  Vitals and nursing note reviewed.   Constitutional:       Appearance: Normal appearance. He is normal weight.   HENT:      Head: Normocephalic and atraumatic.   Cardiovascular:      Rate and Rhythm: Normal rate and regular rhythm.      Pulses: Normal pulses.      Heart sounds: Normal heart sounds. No murmur heard.     No friction rub. No gallop.   Pulmonary:      Effort: Pulmonary effort is normal. No respiratory distress.      Breath sounds: Normal breath sounds. No stridor. No wheezing, rhonchi or rales.   Chest:      Chest wall: No tenderness.   Abdominal:      General: Abdomen is flat. Bowel sounds are normal. There is no distension.      Palpations: Abdomen is soft. There is no mass.      Tenderness: There is no abdominal tenderness. There is no right CVA tenderness, left CVA tenderness, guarding or rebound.      Hernia: No hernia is present.   Musculoskeletal:      Right wrist: Tenderness present. Decreased range of motion.      Right knee: Decreased range of motion. Tenderness present.   Skin:     General: Skin is warm and dry.      Capillary Refill: Capillary refill takes less than 2 seconds.      Coloration: Skin is not jaundiced or pale.   Neurological:      General: No focal deficit present.      Mental Status: He is alert and oriented to person, place, and time. Mental status is at baseline.      Motor: No weakness.      Coordination: Coordination normal.      Gait: Gait normal.   Psychiatric:         Mood and Affect: Mood normal.         Behavior: Behavior normal.         Thought Content: Thought content normal.          Judgment: Judgment normal.          Physical Exam  Extremities: Swelling noted on right and left side of the kneecap.  Musculoskeletal: Pain and tenderness noted in the right wrist.       Result Review  Data Reviewed:{ Labs  Result Review  Imaging  Med Tab  Media :23}   I have reviewed this patient's chart.  I have reviewed previous labs, previous imaging, previous medications, and previous encounters with notes that were available in this patient's chart.    Results                Assessment and Plan {CC Problem List  Visit Diagnosis  ROS  Review (Popup)  [facility]  BestPractice  Medications  SmartSets  SnapShot Encounters  Media :23}   Diagnoses and all orders for this visit:    1. Wrist injury, right, initial encounter (Primary)  -     XR Wrist 3+ View Right; Future    2. Acute pain of right knee  -     XR Knee 1 or 2 View Right; Future  -     XR Knee 1 or 2 View Right    3. Moderate asthma, unspecified whether complicated, unspecified whether persistent  -     Discontinue: Fluticasone-Umeclidin-Vilant (TRELEGY) 100-62.5-25 MCG/ACT inhaler; Inhale 1 puff Daily.  Dispense: 60 each; Refill: 1        Assessment & Plan  1. Right wrist injury.  - Symptoms suggest a potential sprain in the right wrist.  - An x-ray of the right wrist will be ordered locally to confirm the diagnosis.  - Advised to avoid lifting anything heavier than a few pounds for a duration of 3 to 4 weeks.  - Gentle stretching and usage of the wrist are recommended, but with caution.    2. Right knee pain.  - Symptoms include pain and swelling on the right and left side of the kneecap.  - An x-ray of the right knee will be ordered to rule out any dislocation.  - Insurance typically requires a waiting period before approving an MRI.  - The x-ray will help ensure there is no dislocation in the knee.    3. Asthma.  - Using Ventolin inhaler daily, especially at night.  - Trelegy prescribed to be taken as one puff daily  to reduce the need for Ventolin.  - Prescription will be sent to [facility].  - Trelegy aims to prevent bronchial spasms and reduce inflammation.    4. Hypertension.  - Ventolin identified as the primary cause of hypertension due to its effect on heart rate.  - Oxybutynin is a less likely contributor but still possible.  - Ventolin speeds up heart rate and lasts up to 4 hours, potentially causing increased blood pressure.  - Oxybutynin typically relaxes muscles and is more likely to lower blood pressure.       -  -ER red flags discussed with patient including risk versus benefit and education provided.  -Follow-up with me in 2-4  weeks if not improving.    I spent 30 minutes caring for Costa on this date of service. This time includes time spent by me in the following activities:preparing for the visit, reviewing tests, obtaining and/or reviewing a separately obtained history, performing a medically appropriate examination and/or evaluation , counseling and educating the patient/family/caregiver, ordering medications, tests, or procedures, referring and communicating with other health care professionals , documenting information in the medical record, independently interpreting results and communicating that information with the patient/family/caregiver, and care coordination.    Follow Up {Instructions Charge Capture  Follow-up Communications :23}     Patient was given instructions and counseling regarding his condition or for health maintenance advice. Please see specific information pulled into the AVS (placed there by myself) if appropriate.    Return in about 4 weeks (around 4/30/2025), or if symptoms worsen or fail to improve, for Recheck.    Class 3 Severe Obesity (BMI >=40). Obesity-related health conditions include the following: dyslipidemias. Obesity is unchanged. BMI is is above average; BMI management plan is completed. We discussed portion control and increasing exercise.         Devon Win,  RAHUL, LAMARP-BC      Patient or patient representative verbalized consent for the use of Ambient Listening during the visit with  RAHUL Ayoub for chart documentation. 4/22/2025  11:09 EDT

## 2025-04-03 ENCOUNTER — PATIENT MESSAGE (OUTPATIENT)
Dept: FAMILY MEDICINE CLINIC | Facility: CLINIC | Age: 35
End: 2025-04-03
Payer: MEDICAID

## 2025-04-03 ENCOUNTER — PRIOR AUTHORIZATION (OUTPATIENT)
Dept: FAMILY MEDICINE CLINIC | Facility: CLINIC | Age: 35
End: 2025-04-03
Payer: MEDICAID

## 2025-04-03 DIAGNOSIS — J45.909 MODERATE ASTHMA, UNSPECIFIED WHETHER COMPLICATED, UNSPECIFIED WHETHER PERSISTENT: Primary | ICD-10-CM

## 2025-04-09 ENCOUNTER — TELEPHONE (OUTPATIENT)
Dept: FAMILY MEDICINE CLINIC | Facility: CLINIC | Age: 35
End: 2025-04-09
Payer: MEDICAID

## 2025-04-09 NOTE — TELEPHONE ENCOUNTER
Hub staff attempted to follow warm transfer process and was unsuccessful     Caller: Costa Watson    Relationship to patient: Self    Best call back number: [phone]    Patient is needing: PATIENT IS REQUESTING A SAME DAY APPOINTMENT FOR SEVERE KNEE PAIN, UNABLE TO BEND KNEE

## 2025-04-13 RX ORDER — BUDESONIDE AND FORMOTEROL FUMARATE DIHYDRATE 160; 4.5 UG/1; UG/1
2 AEROSOL RESPIRATORY (INHALATION)
Qty: 10.2 G | Refills: 3 | Status: SHIPPED | OUTPATIENT
Start: 2025-04-13

## 2025-04-22 ENCOUNTER — OFFICE VISIT (OUTPATIENT)
Dept: FAMILY MEDICINE CLINIC | Facility: CLINIC | Age: 35
End: 2025-04-22
Payer: MEDICAID

## 2025-04-22 VITALS
TEMPERATURE: 98.2 F | HEIGHT: 68 IN | HEART RATE: 87 BPM | SYSTOLIC BLOOD PRESSURE: 133 MMHG | WEIGHT: 287.2 LBS | DIASTOLIC BLOOD PRESSURE: 90 MMHG | BODY MASS INDEX: 43.53 KG/M2 | RESPIRATION RATE: 18 BRPM | OXYGEN SATURATION: 96 %

## 2025-04-22 DIAGNOSIS — I10 PRIMARY HYPERTENSION: Primary | ICD-10-CM

## 2025-04-22 DIAGNOSIS — R03.0 ELEVATED BLOOD PRESSURE READING WITHOUT DIAGNOSIS OF HYPERTENSION: ICD-10-CM

## 2025-04-22 DIAGNOSIS — J30.1 SEASONAL ALLERGIC RHINITIS DUE TO POLLEN: ICD-10-CM

## 2025-04-22 DIAGNOSIS — F64.0 GENDER DYSPHORIA IN ADULT: ICD-10-CM

## 2025-04-22 DIAGNOSIS — J45.50 SEVERE PERSISTENT ASTHMA, UNSPECIFIED WHETHER COMPLICATED: ICD-10-CM

## 2025-04-22 RX ORDER — FLUTICASONE FUROATE, UMECLIDINIUM BROMIDE AND VILANTEROL TRIFENATATE 100; 62.5; 25 UG/1; UG/1; UG/1
1 POWDER RESPIRATORY (INHALATION)
Qty: 60 EACH | Refills: 1 | Status: SHIPPED | OUTPATIENT
Start: 2025-04-22

## 2025-04-22 RX ORDER — HYDROCHLOROTHIAZIDE 25 MG/1
25 TABLET ORAL DAILY
Qty: 30 TABLET | Refills: 2 | Status: SHIPPED | OUTPATIENT
Start: 2025-04-22

## 2025-04-22 RX ORDER — AMLODIPINE BESYLATE 10 MG/1
10 TABLET ORAL DAILY
Qty: 90 TABLET | Refills: 1 | Status: SHIPPED | OUTPATIENT
Start: 2025-04-22

## 2025-04-22 RX ORDER — TESTOSTERONE CYPIONATE 200 MG/ML
200 INJECTION, SOLUTION INTRAMUSCULAR ONCE
Status: COMPLETED | OUTPATIENT
Start: 2025-04-22 | End: 2025-04-22

## 2025-04-22 RX ADMIN — TESTOSTERONE CYPIONATE 200 MG: 200 INJECTION, SOLUTION INTRAMUSCULAR at 11:19

## 2025-04-22 NOTE — PROGRESS NOTES
Chief Complaint  Hypertension    Subjective    History of Present Illness {CC  Problem List  Visit  Diagnosis   Encounters  Notes  Medications  Labs  Result Review Imaging  Media :23}     Costa Watson presents to [facility] for Hypertension.      History of Present Illness  Patient is a 34 y.o. transgender male who presents to the clinic today for 4-week follow-up for hypertension, with concerns of worsening asthma management and allergies..  Patient denies any chest pain, shortness of breath, or any fevers.  Patient denies any known exposure to COVID, flu, or any other contagious illnesses.       History of Present Illness  In regards to hypertension, persistent elevated blood pressure readings are reported at home, with diastolic values consistently exceeding 90. Blood pressure has not been monitored in recent days by patient. Headaches are experienced, although their frequency has decreased. Agreement is given to increase the amlodipine dose to 10 mg. He is currently on amlodipine and hydrochlorothiazide, but is uncertain about the availability of refills for the latter.  Patient denies any further concerns or issues with his hypertension at this time.    In regards to asthma, dissatisfaction is expressed with the efficacy of Symbicort in managing asthma symptoms, particularly chest tightness during nighttime.  Patient reports that Trelegy was a much better option and while on Symbicort he is required to use rescue inhaler multiple times to offset its lack of efficacy.  Insurance coverage for Trelegy has been declined until Symbicort is trialed. A positive response to Trelegy is recalled when it was previously prescribed as a sample.  Patient would like to try for insurance approval for Trelegy again.    In regards to chronic seasonal and environmental allergies, the allergy medication, Claritin, appears to be ineffective, providing only minimal relief at this time.  Significant nasal congestion continues to be experienced. Zyrtec and Allegra have been tried previously without success. Allergy testing has been undergone in the past, and allergy injections have been received. He has been advised to carry an EpiPen and a mask at all times by his allergy provider previously.  Patient would like a referral to an allergy specialist in our area to try to better address his environmental and seasonal allergies.    Patient also is requesting A referral to an OB-GYN specialist located closer to his residence is requested due to transportation difficulties.  A transgender friendly provider is preferred.       Review of Systems   Constitutional: Negative.  Negative for activity change, chills, fatigue and fever.   HENT: Negative.  Negative for congestion, dental problem, ear pain, hearing loss, rhinorrhea, sinus pain, sore throat, tinnitus and trouble swallowing.    Eyes: Negative.  Negative for pain and visual disturbance.   Respiratory: Negative.  Negative for cough, chest tightness, shortness of breath and wheezing.    Cardiovascular: Negative.  Negative for chest pain, palpitations and leg swelling.   Gastrointestinal: Negative.  Negative for abdominal pain, diarrhea, nausea and vomiting.   Endocrine: Negative.  Negative for polydipsia, polyphagia and polyuria.   Genitourinary: Negative.  Negative for difficulty urinating, dysuria, frequency and urgency.   Musculoskeletal: Negative.  Negative for arthralgias, back pain and myalgias.   Skin: Negative.  Negative for color change, pallor, rash and wound.   Allergic/Immunologic: Positive for environmental allergies.   Neurological: Negative.  Negative for dizziness, speech difficulty, weakness, light-headedness, numbness and headaches.   Hematological: Negative.    Psychiatric/Behavioral: Negative.  Negative for confusion, decreased concentration, self-injury and suicidal ideas. The patient is not nervous/anxious.    All other systems  "reviewed and are negative.       Objective     Vital Signs:   /90 (BP Location: Left arm, Patient Position: Sitting, Cuff Size: Adult)   Pulse 87   Temp 98.2 °F (36.8 °C) (Temporal)   Resp 18   Ht 172.7 cm (67.99\")   Wt 130 kg (287 lb 3.2 oz)   SpO2 96%   BMI 43.68 kg/m²   Current Outpatient Medications on File Prior to Visit   Medication Sig Dispense Refill    Blood Pressure Monitoring (RA Blood Pressure Cuff Monitor) device Use 1 each 2 (Two) Times a Day. 1 each 0    Cariprazine HCl (VRAYLAR) 3 MG capsule capsule Take 4.5 mg by mouth Daily.      fluticasone (FLONASE) 50 MCG/ACT nasal spray Administer 2 sprays into the nostril(s) as directed by provider Daily. 18.2 g 3    glucose blood test strip 1 each by Other route 2 (Two) Times a Day As Needed (blood sugar monitoring). 90 each 1    glucose monitor monitoring kit Use 1 each Daily. 1 each 1    ibuprofen (ADVIL,MOTRIN) 600 MG tablet Take 1 tablet by mouth Every 8 (Eight) Hours As Needed for Moderate Pain. 90 tablet 0    Jornay PM 20 MG capsule sustained-release 24 hr Take 1 capsule by mouth Every Night.      loratadine (Claritin) 10 MG tablet Take 1 tablet by mouth Daily As Needed for Allergies. 30 tablet 2    mirtazapine (REMERON) 15 MG tablet Take 1 tablet by mouth Every Night.      Needle, Disp, (B-D BLUNT FILL NEEDLE) 18G X 1-1/2\" misc Use 1 each 1 (One) Time Per Week. 50 each 1    Needle, Disp, 20G X 1\" misc Use 1 each 1 (One) Time Per Week. 50 each 1    ondansetron ODT (ZOFRAN-ODT) 8 MG disintegrating tablet Place 1 tablet on the tongue Every 8 (Eight) Hours As Needed for Nausea or Vomiting. 15 tablet 0    sertraline (ZOLOFT) 100 MG tablet Take 1.5 tablets by mouth Daily.      Syringe 21G X 1\" 3 ML misc Use 1 each 1 (One) Time Per Week. 4 each 2    Syringe, Disposable, (MONOJECT 3CC SYRINGE) 3 ML misc Use 1 each 1 (One) Time Per Week. 4 each 2    Testosterone Cypionate (Depo-Testosterone) 200 MG/ML injection Inject 1 mL into the appropriate " muscle as directed by prescriber Every 14 (Fourteen) Days. 2 mL 1    Ventolin  (90 Base) MCG/ACT inhaler Inhale 2 puffs Every 4 (Four) Hours As Needed for Wheezing. 18 g 3    oxybutynin XL (DITROPAN XL) 15 MG 24 hr tablet Take 1 tablet by mouth Daily. (Patient not taking: Reported on 4/22/2025)       No current facility-administered medications on file prior to visit.        Past Medical History:   Diagnosis Date    Acid reflux     ADHD (attention deficit hyperactivity disorder)     Allergic     Pollen/mold/cats    Anxiety     Arthritis     Asthma     C. difficile colitis     Cyst, sinus nasal     Depression     Diverticulosis     Fibromyalgia     Hilar mass     histoplasmosis    Histoplasmosis     Hx of gastroesophageal reflux (GERD)     Hyperlipidemia     Low back pain     NSTEMI (non-ST elevated myocardial infarction) 09/09/2018    related to histoplasmosis    Obesity     Palpitations     PCOS (polycystic ovarian syndrome)     Polycystic ovary syndrome     Schizoaffective disorder     Varicella       Past Surgical History:   Procedure Laterality Date    CARDIAC CATHETERIZATION      CHOLECYSTECTOMY      laparoscopic    LAPAROSCOPIC CHOLECYSTECTOMY      MEDIASTINOSCOPY        Family History   Problem Relation Age of Onset    Diabetes Father     Arthritis Father     Alzheimer's disease Mother     Stroke Mother     Osteoporosis Maternal Grandmother     Heart attack Maternal Grandmother         unknown    Heart disease Maternal Grandmother         unknown    Heart attack Maternal Grandfather         unknown    Heart disease Maternal Grandfather         unknown    Breast cancer Paternal Aunt     Prostate cancer Paternal Uncle     Heart disease Paternal Grandfather     Heart disease Paternal Grandmother     Cancer Paternal Aunt     Ovarian cancer Neg Hx     Colon cancer Neg Hx     Uterine cancer Neg Hx       Social History     Socioeconomic History    Marital status: Single   Tobacco Use    Smoking status: Every  Day     Current packs/day: 1.00     Average packs/day: 1 pack/day for 17.3 years (17.3 ttl pk-yrs)     Types: Cigarettes     Start date: 2008     Passive exposure: Never    Smokeless tobacco: Never    Tobacco comments:     He is aware of consequences of smoking.    Vaping Use    Vaping status: Former    Substances: Nicotine    Devices: Pre-filled or refillable cartridge    Passive vaping exposure: Yes   Substance and Sexual Activity    Alcohol use: No    Drug use: No    Sexual activity: Yes     Partners: Female     Birth control/protection: None         Office Visit on 03/25/2025   Component Date Value Ref Range Status    Glucose 03/25/2025 103 (H)  65 - 99 mg/dL Final    BUN 03/25/2025 11  6 - 20 mg/dL Final    Creatinine 03/25/2025 0.99  0.57 - 1.00 mg/dL Final    Sodium 03/25/2025 137  136 - 145 mmol/L Final    Potassium 03/25/2025 3.8  3.5 - 5.2 mmol/L Final    Chloride 03/25/2025 101  98 - 107 mmol/L Final    CO2 03/25/2025 25.4  22.0 - 29.0 mmol/L Final    Calcium 03/25/2025 9.8  8.6 - 10.5 mg/dL Final    Total Protein 03/25/2025 7.2  6.0 - 8.5 g/dL Final    Albumin 03/25/2025 4.0  3.5 - 5.2 g/dL Final    ALT (SGPT) 03/25/2025 51 (H)  1 - 33 U/L Final    AST (SGOT) 03/25/2025 29  1 - 32 U/L Final    Alkaline Phosphatase 03/25/2025 92  39 - 117 U/L Final    Total Bilirubin 03/25/2025 0.2  0.0 - 1.2 mg/dL Final    Globulin 03/25/2025 3.2  gm/dL Final    A/G Ratio 03/25/2025 1.3  g/dL Final    BUN/Creatinine Ratio 03/25/2025 11.1  7.0 - 25.0 Final    Anion Gap 03/25/2025 10.6  5.0 - 15.0 mmol/L Final    eGFR 03/25/2025 77.9  >60.0 mL/min/1.73 Final    Total Cholesterol 03/25/2025 223 (H)  0 - 200 mg/dL Final    Triglycerides 03/25/2025 283 (H)  0 - 150 mg/dL Final    HDL Cholesterol 03/25/2025 33 (L)  40 - 60 mg/dL Final    LDL Cholesterol  03/25/2025 139 (H)  0 - 100 mg/dL Final    VLDL Cholesterol 03/25/2025 51 (H)  5 - 40 mg/dL Final    LDL/HDL Ratio 03/25/2025 4.04   Final    Hemoglobin A1C 03/25/2025 5.60   4.80 - 5.60 % Final    Testosterone, Total 03/25/2025 502.9 (H)  10.0 - 55.0 ng/dL Final    **Verified by repeat analysis**    Testosterone, Free 03/25/2025 14.5 (H)  0.0 - 4.2 pg/mL Final    WBC 03/25/2025 8.40  3.40 - 10.80 10*3/mm3 Final    RBC 03/25/2025 5.31 (H)  3.77 - 5.28 10*6/mm3 Final    Hemoglobin 03/25/2025 16.9 (H)  12.0 - 15.9 g/dL Final    Hematocrit 03/25/2025 49.1 (H)  34.0 - 46.6 % Final    MCV 03/25/2025 92.5  79.0 - 97.0 fL Final    MCH 03/25/2025 31.8  26.6 - 33.0 pg Final    MCHC 03/25/2025 34.4  31.5 - 35.7 g/dL Final    RDW 03/25/2025 13.9  12.3 - 15.4 % Final    RDW-SD 03/25/2025 47.2  37.0 - 54.0 fl Final    MPV 03/25/2025 11.6  6.0 - 12.0 fL Final    Platelets 03/25/2025 226  140 - 450 10*3/mm3 Final    Neutrophil % 03/25/2025 56.5  42.7 - 76.0 % Final    Lymphocyte % 03/25/2025 30.8  19.6 - 45.3 % Final    Monocyte % 03/25/2025 6.0  5.0 - 12.0 % Final    Eosinophil % 03/25/2025 5.6  0.3 - 6.2 % Final    Basophil % 03/25/2025 1.0  0.0 - 1.5 % Final    Immature Grans % 03/25/2025 0.1  0.0 - 0.5 % Final    Neutrophils, Absolute 03/25/2025 4.75  1.70 - 7.00 10*3/mm3 Final    Lymphocytes, Absolute 03/25/2025 2.59  0.70 - 3.10 10*3/mm3 Final    Monocytes, Absolute 03/25/2025 0.50  0.10 - 0.90 10*3/mm3 Final    Eosinophils, Absolute 03/25/2025 0.47 (H)  0.00 - 0.40 10*3/mm3 Final    Basophils, Absolute 03/25/2025 0.08  0.00 - 0.20 10*3/mm3 Final    Immature Grans, Absolute 03/25/2025 0.01  0.00 - 0.05 10*3/mm3 Final    nRBC 03/25/2025 0.0  0.0 - 0.2 /100 WBC Final   Office Visit on 03/06/2025   Component Date Value Ref Range Status    SARS Antigen 03/06/2025 Detected (A)  Not Detected, Presumptive Negative Final    Influenza A Antigen GARCIA 03/06/2025 Not Detected  Not Detected Final    Influenza B Antigen GARCIA 03/06/2025 Not Detected  Not Detected Final    Internal Control 03/06/2025 Passed  Passed Final    Lot Number 03/06/2025 [id number]   Final    Expiration Date 03/06/2025 11/27/2025   Final     Rapid Strep A Screen 03/06/2025 Negative  Negative, VALID, INVALID, Not Performed Final    Internal Control 03/06/2025 Passed  Passed Final    Lot Number 03/06/2025 [id number]   Final    Expiration Date 03/06/2025 01/08/2026   Final         Physical Exam  Vitals and nursing note reviewed.   Constitutional:       Appearance: Normal appearance. He is normal weight.   HENT:      Head: Normocephalic and atraumatic.   Cardiovascular:      Rate and Rhythm: Normal rate and regular rhythm.      Pulses: Normal pulses.      Heart sounds: Normal heart sounds. No murmur heard.     No friction rub. No gallop.   Pulmonary:      Effort: Pulmonary effort is normal. No respiratory distress.      Breath sounds: Normal breath sounds. No stridor. No wheezing, rhonchi or rales.   Chest:      Chest wall: No tenderness.   Abdominal:      General: Abdomen is flat. Bowel sounds are normal. There is no distension.      Palpations: Abdomen is soft. There is no mass.      Tenderness: There is no abdominal tenderness. There is no right CVA tenderness, left CVA tenderness, guarding or rebound.      Hernia: No hernia is present.   Skin:     General: Skin is warm and dry.      Capillary Refill: Capillary refill takes less than 2 seconds.      Coloration: Skin is not jaundiced or pale.   Neurological:      General: No focal deficit present.      Mental Status: He is alert and oriented to person, place, and time. Mental status is at baseline.      Motor: No weakness.      Coordination: Coordination normal.      Gait: Gait normal.   Psychiatric:         Mood and Affect: Mood normal.         Behavior: Behavior normal.         Thought Content: Thought content normal.         Judgment: Judgment normal.          Physical Exam         Result Review  Data Reviewed:{ Labs  Result Review  Imaging  Med Tab  Media :23}   I have reviewed this patient's chart.  I have reviewed previous labs, previous imaging, previous medications, and previous encounters with  notes that were available in this patient's chart.    Results                Assessment and Plan {CC Problem List  Visit Diagnosis  ROS  Review (Popup)  [facility]  BestPractice  Medications  SmartSets  SnapShot Encounters  Media :23}   Diagnoses and all orders for this visit:    1. Primary hypertension (Primary)  -     amLODIPine (NORVASC) 10 MG tablet; Take 1 tablet by mouth Daily.  Dispense: 90 tablet; Refill: 1    2. Elevated blood pressure reading without diagnosis of hypertension  -     hydroCHLOROthiazide 25 MG tablet; Take 1 tablet by mouth Daily.  Dispense: 30 tablet; Refill: 2  -     Ambulatory Referral to Allergy    3. Severe persistent asthma, unspecified whether complicated  -     Fluticasone-Umeclidin-Vilant (Trelegy Ellipta) 100-62.5-25 MCG/ACT inhaler; Inhale 1 puff Daily.  Dispense: 60 each; Refill: 1  -     Ambulatory Referral to Allergy    4. Seasonal allergic rhinitis due to pollen    5. Gender dysphoria in adult  -     Cancel: Ambulatory Referral to Gynecology  -     Ambulatory Referral to Gynecology  -     Testosterone Cypionate (DEPOTESTOTERONE CYPIONATE) injection 200 mg        Assessment & Plan  1. Hypertension.  - Blood pressure remains elevated despite current medication.  - Dosage of amlodipine will be increased to 10 mg, which is the maximum dose for this medication.  - A 90-day supply will be sent to his pharmacy ([facility]). He will continue taking hydrochlorothiazide, and a refill will be provided.  - Headaches have decreased in frequency.    2. Asthma.  - Reports that Symbicort is not effective in managing symptoms, particularly chest tightness at night.  - Trelegy was previously effective; Symbicort will be discontinued, and Trelegy will be reintroduced.  - Prior authorization for Trelegy will be resubmitted. Samples of Trelegy will be provided during this visit.  - Asthma is classified as severe persistent.    3. Seasonal allergies.  - Current allergy  medication is not effective; previously tried Zyrtec and Allegra without success.  - Referral to an allergy specialist for further evaluation and management will be placed.  - Specialist will be informed of asthma and chronic seasonal allergies.  - Advised to carry an EpiPen and a mask as recommended.    4. GYN referral.  - Referral to an OB-GYN specialist close to home will be placed to accommodate transportation needs.  - Preference for referral location noted as close to home due to transportation issues.  - Referral will include information about chronic seasonal allergies and asthma.       -  -ER red flags discussed with patient including risk versus benefit and education provided.  -Follow-up with me in 3 months or sooner if needed.  Repeat blood pressure check in 2 to 4 weeks with the nurse.    I spent 40 minutes caring for Costa on this date of service. This time includes time spent by me in the following activities:preparing for the visit, reviewing tests, obtaining and/or reviewing a separately obtained history, performing a medically appropriate examination and/or evaluation , counseling and educating the patient/family/caregiver, ordering medications, tests, or procedures, referring and communicating with other health care professionals , documenting information in the medical record, independently interpreting results and communicating that information with the patient/family/caregiver, and care coordination.    Follow Up {Instructions Charge Capture  Follow-up Communications :23}     Patient was given instructions and counseling regarding his condition or for health maintenance advice. Please see specific information pulled into the AVS (placed there by myself) if appropriate.    Return in about 3 months (around 7/22/2025) for routine follow up.    Class 3 Severe Obesity (BMI >=40). Obesity-related health conditions include the following: hypertension. Obesity is unchanged. BMI is is above average;  BMI management plan is completed. We discussed portion control and increasing exercise.         RAHUL Ayoub, [facility]-BC      Patient or patient representative verbalized consent for the use of Ambient Listening during the visit with  RAHUL Ayoub for chart documentation. 5/2/2025  07:50 EDT

## 2025-04-25 ENCOUNTER — PRIOR AUTHORIZATION (OUTPATIENT)
Dept: FAMILY MEDICINE CLINIC | Facility: CLINIC | Age: 35
End: 2025-04-25
Payer: MEDICAID

## 2025-05-06 ENCOUNTER — PATIENT MESSAGE (OUTPATIENT)
Dept: FAMILY MEDICINE CLINIC | Facility: CLINIC | Age: 35
End: 2025-05-06
Payer: MEDICAID

## 2025-05-06 DIAGNOSIS — Z78.9 FEMALE-TO-MALE TRANSGENDER PERSON: ICD-10-CM

## 2025-05-06 DIAGNOSIS — N39.46 MIXED STRESS AND URGE URINARY INCONTINENCE: Primary | ICD-10-CM

## 2025-05-14 DIAGNOSIS — R03.0 ELEVATED BLOOD PRESSURE READING WITHOUT DIAGNOSIS OF HYPERTENSION: ICD-10-CM

## 2025-05-15 RX ORDER — HYDROCHLOROTHIAZIDE 25 MG/1
25 TABLET ORAL DAILY
Qty: 90 TABLET | Refills: 0 | Status: SHIPPED | OUTPATIENT
Start: 2025-05-15

## 2025-05-20 ENCOUNTER — OFFICE VISIT (OUTPATIENT)
Dept: FAMILY MEDICINE CLINIC | Facility: CLINIC | Age: 35
End: 2025-05-20
Payer: MEDICAID

## 2025-05-20 VITALS
RESPIRATION RATE: 18 BRPM | WEIGHT: 282.8 LBS | TEMPERATURE: 97.9 F | DIASTOLIC BLOOD PRESSURE: 80 MMHG | HEART RATE: 95 BPM | OXYGEN SATURATION: 95 % | SYSTOLIC BLOOD PRESSURE: 125 MMHG | BODY MASS INDEX: 42.86 KG/M2 | HEIGHT: 68 IN

## 2025-05-20 DIAGNOSIS — R53.82 CHRONIC FATIGUE: ICD-10-CM

## 2025-05-20 DIAGNOSIS — Z09 HOSPITAL DISCHARGE FOLLOW-UP: Primary | ICD-10-CM

## 2025-05-20 DIAGNOSIS — R94.31 ST ELEVATION: ICD-10-CM

## 2025-05-20 DIAGNOSIS — R94.31 ABNORMAL EKG: ICD-10-CM

## 2025-05-20 DIAGNOSIS — E34.9 HORMONE IMBALANCE: ICD-10-CM

## 2025-05-20 DIAGNOSIS — F64.0 GENDER DYSPHORIA IN ADULT: ICD-10-CM

## 2025-05-20 DIAGNOSIS — G89.29 CHRONIC JOINT PAIN: ICD-10-CM

## 2025-05-20 DIAGNOSIS — I25.2 HISTORY OF HEART ATTACK: ICD-10-CM

## 2025-05-20 DIAGNOSIS — R50.9 FEVER, UNSPECIFIED FEVER CAUSE: ICD-10-CM

## 2025-05-20 DIAGNOSIS — M25.50 CHRONIC JOINT PAIN: ICD-10-CM

## 2025-05-20 LAB
EXPIRATION DATE: NORMAL
EXPIRATION DATE: NORMAL
FLUAV AG UPPER RESP QL IA.RAPID: NOT DETECTED
FLUBV AG UPPER RESP QL IA.RAPID: NOT DETECTED
INTERNAL CONTROL: NORMAL
INTERNAL CONTROL: NORMAL
Lab: NORMAL
Lab: NORMAL
S PYO RRNA THROAT QL PROBE: NEGATIVE
SARS-COV-2 AG UPPER RESP QL IA.RAPID: NOT DETECTED

## 2025-05-20 PROCEDURE — 86063 ANTISTREPTOLYSIN O SCREEN: CPT | Performed by: REGISTERED NURSE

## 2025-05-20 PROCEDURE — 86431 RHEUMATOID FACTOR QUANT: CPT | Performed by: REGISTERED NURSE

## 2025-05-20 PROCEDURE — 86140 C-REACTIVE PROTEIN: CPT | Performed by: REGISTERED NURSE

## 2025-05-20 PROCEDURE — 84550 ASSAY OF BLOOD/URIC ACID: CPT | Performed by: REGISTERED NURSE

## 2025-05-20 PROCEDURE — 86038 ANTINUCLEAR ANTIBODIES: CPT | Performed by: REGISTERED NURSE

## 2025-05-20 RX ORDER — CARIPRAZINE 4.5 MG/1
4.5 CAPSULE, GELATIN COATED ORAL EVERY EVENING
COMMUNITY
Start: 2025-05-15 | End: 2025-06-11 | Stop reason: ALTCHOICE

## 2025-05-20 RX ORDER — MECLIZINE HYDROCHLORIDE 25 MG/1
25 TABLET ORAL 3 TIMES DAILY PRN
COMMUNITY
End: 2025-06-11 | Stop reason: ALTCHOICE

## 2025-05-20 RX ORDER — SERTRALINE HYDROCHLORIDE 150 MG/1
1 CAPSULE ORAL EVERY EVENING
COMMUNITY
Start: 2025-05-15

## 2025-05-20 RX ORDER — CIPROFLOXACIN 500 MG/1
500 TABLET, FILM COATED ORAL 2 TIMES DAILY
COMMUNITY
End: 2025-06-11 | Stop reason: ALTCHOICE

## 2025-05-20 RX ORDER — TESTOSTERONE CYPIONATE 200 MG/ML
200 INJECTION, SOLUTION INTRAMUSCULAR ONCE
Status: COMPLETED | OUTPATIENT
Start: 2025-05-20 | End: 2025-05-20

## 2025-05-20 RX ORDER — ONDANSETRON 4 MG/1
TABLET, ORALLY DISINTEGRATING ORAL
COMMUNITY

## 2025-05-20 RX ORDER — OXYBUTYNIN CHLORIDE 5 MG/1
1 TABLET, EXTENDED RELEASE ORAL DAILY
COMMUNITY
Start: 2025-05-01

## 2025-05-20 RX ORDER — TESTOSTERONE CYPIONATE 200 MG/ML
200 INJECTION, SOLUTION INTRAMUSCULAR
Qty: 2 ML | Refills: 1 | Status: SHIPPED | OUTPATIENT
Start: 2025-05-20

## 2025-05-20 RX ORDER — METHYLPHENIDATE HYDROCHLORIDE 60 MG/1
1 CAPSULE ORAL DAILY
COMMUNITY
Start: 2025-05-05 | End: 2025-06-11 | Stop reason: ALTCHOICE

## 2025-05-20 RX ORDER — ZOLPIDEM TARTRATE 5 MG/1
5 TABLET ORAL EVERY EVENING
COMMUNITY
Start: 2025-05-05 | End: 2025-06-11 | Stop reason: ALTCHOICE

## 2025-05-20 RX ADMIN — TESTOSTERONE CYPIONATE 200 MG: 200 INJECTION, SOLUTION INTRAMUSCULAR at 14:14

## 2025-05-20 NOTE — PROGRESS NOTES
Venipuncture Blood Specimen Collection  Venipuncture performed in rt arm by Andrew Lozoya with good hemostasis. Patient tolerated the procedure well without complications.   05/20/25   Andrew Lozoya

## 2025-05-20 NOTE — PROGRESS NOTES
Chief Complaint  Pain and ER f/u (Was seen at [facility].  Had dizziness, neck pain, headache, fever.  Had UTI, ear infection, and virus)    Subjective    History of Present Illness {CC  Problem List  Visit  Diagnosis   Encounters  Notes  Medications  Labs  Result Review Imaging  Media :23}     Costa Watson presents to [facility] for Pain and ER f/u (Was seen at [facility].  Had dizziness, neck pain, headache, fever.  Had UTI, ear infection, and virus).      History of Present Illness  Patient is a 34 y.o. transgender male who presents to the clinic today for 4-day hospital follow-up with concerns of chronic joint pain greater than 6 months.  Patient denies any chest pain, shortness of breath, or any fevers.  Patient denies any known exposure to COVID, flu, or any other contagious illnesses.       History of Present Illness  In regards to hospital follow-up, he sought medical attention at the [facility] on 05/16/2025 due to persistent neck and hand pain that had been present for 2 days prior.  Patient reports some symptoms that were potentially concerning for respiratory infection.  He has not undergone any home testing for COVID-19 or influenza due to financial constraints. He reports experiencing intermittent fevers and nasal congestion, which resolved following his hospital visit. He continues to experience allergy symptoms, which he believes are not being adequately managed by his current medication. He has not attempted to switch medications due to financial constraints.  Patient further reports that he had an abnormal EKG at the hospital. He is not experiencing any chest pain at present but reports frequent tingling in both hands. He has a history of myocardial infarction at the age of 27.  With this in mind I would highly recommend patient repeat EKG today and follow-up with cardiology.    He expresses a desire to be evaluated for lupus, as he  "frequently experiences various health issues. He also reports significant fatigue and severe neck pain, which he is uncertain if it is related to his other symptoms.    He is currently on Cipro antibiotic, which has significantly improved his urinary symptoms.    He is requesting a refill of his testosterone injection.         Review of Systems   Constitutional: Negative.  Negative for activity change, chills, fatigue and fever.   HENT: Negative.  Negative for congestion, dental problem, ear pain, hearing loss, rhinorrhea, sinus pain, sore throat, tinnitus and trouble swallowing.    Eyes: Negative.  Negative for pain and visual disturbance.   Respiratory: Negative.  Negative for cough, chest tightness, shortness of breath and wheezing.    Cardiovascular: Negative.  Negative for chest pain, palpitations and leg swelling.   Gastrointestinal: Negative.  Negative for abdominal pain, diarrhea, nausea and vomiting.   Endocrine: Negative.  Negative for polydipsia, polyphagia and polyuria.   Genitourinary: Negative.  Negative for difficulty urinating, dysuria, frequency and urgency.   Musculoskeletal:  Positive for arthralgias and myalgias. Negative for back pain.   Skin: Negative.  Negative for color change, pallor, rash and wound.   Allergic/Immunologic: Negative.  Negative for environmental allergies.   Neurological: Negative.  Negative for dizziness, speech difficulty, weakness, light-headedness, numbness and headaches.   Hematological: Negative.    Psychiatric/Behavioral: Negative.  Negative for confusion, decreased concentration, self-injury and suicidal ideas. The patient is not nervous/anxious.    All other systems reviewed and are negative.       Objective     Vital Signs:   /80 (BP Location: Left arm, Patient Position: Sitting, Cuff Size: Adult)   Pulse 95   Temp 97.9 °F (36.6 °C) (Temporal)   Resp 18   Ht 172.7 cm (67.99\")   Wt 128 kg (282 lb 12.8 oz)   SpO2 95%   BMI 43.01 kg/m²   Current Outpatient " "Medications on File Prior to Visit   Medication Sig Dispense Refill    amLODIPine (NORVASC) 10 MG tablet Take 1 tablet by mouth Daily. 90 tablet 1    Blood Pressure Monitoring (RA Blood Pressure Cuff Monitor) device Use 1 each 2 (Two) Times a Day. 1 each 0    fluticasone (FLONASE) 50 MCG/ACT nasal spray Administer 2 sprays into the nostril(s) as directed by provider Daily. 18.2 g 3    glucose blood test strip 1 each by Other route 2 (Two) Times a Day As Needed (blood sugar monitoring). 90 each 1    glucose monitor monitoring kit Use 1 each Daily. 1 each 1    hydroCHLOROthiazide 25 MG tablet Take 1 tablet by mouth once daily 90 tablet 0    ibuprofen (ADVIL,MOTRIN) 600 MG tablet Take 1 tablet by mouth Every 8 (Eight) Hours As Needed for Moderate Pain. 90 tablet 0    mirtazapine (REMERON) 15 MG tablet Take 1 tablet by mouth Every Night.      Needle, Disp, (B-D BLUNT FILL NEEDLE) 18G X 1-1/2\" misc Use 1 each 1 (One) Time Per Week. 50 each 1    Needle, Disp, 20G X 1\" misc Use 1 each 1 (One) Time Per Week. 50 each 1    oxybutynin XL (DITROPAN-XL) 5 MG 24 hr tablet Take 1 tablet by mouth Daily.      Sertraline HCl 150 MG capsule Take 1 capsule by mouth Every Evening.      Syringe 21G X 1\" 3 ML misc Use 1 each 1 (One) Time Per Week. 4 each 2    Syringe, Disposable, (MONOJECT 3CC SYRINGE) 3 ML misc Use 1 each 1 (One) Time Per Week. 4 each 2    Ventolin  (90 Base) MCG/ACT inhaler Inhale 2 puffs Every 4 (Four) Hours As Needed for Wheezing. 18 g 3    ondansetron ODT (ZOFRAN-ODT) 4 MG disintegrating tablet dissolve 1 tablet in mouth every 8 hours as needed for nausea       No current facility-administered medications on file prior to visit.        Past Medical History:   Diagnosis Date    Acid reflux     ADHD (attention deficit hyperactivity disorder)     Allergic     Pollen/mold/cats    Anxiety     Arthritis     Asthma     C. difficile colitis     Cyst, sinus nasal     Depression     Diverticulosis     Fibromyalgia     " Hilar mass     histoplasmosis    Histoplasmosis     Hx of gastroesophageal reflux (GERD)     Hyperlipidemia     Low back pain     NSTEMI (non-ST elevated myocardial infarction) 09/09/2018    related to histoplasmosis    Obesity     Palpitations     PCOS (polycystic ovarian syndrome)     Polycystic ovary syndrome     Schizoaffective disorder     Varicella       Past Surgical History:   Procedure Laterality Date    CARDIAC CATHETERIZATION      CHOLECYSTECTOMY      laparoscopic    LAPAROSCOPIC CHOLECYSTECTOMY      MEDIASTINOSCOPY        Family History   Problem Relation Age of Onset    Alzheimer's disease Mother     Stroke Mother     Diabetes Father     Arthritis Father     Atrial fibrillation Father     Breast cancer Paternal Aunt     Cancer Paternal Aunt     Prostate cancer Paternal Uncle     Osteoporosis Maternal Grandmother     Heart attack Maternal Grandmother         unknown    Heart disease Maternal Grandmother         unknown    Heart attack Maternal Grandfather         unknown    Heart disease Maternal Grandfather         unknown    Heart disease Paternal Grandmother     Heart disease Paternal Grandfather     Ovarian cancer Neg Hx     Colon cancer Neg Hx     Uterine cancer Neg Hx       Social History     Socioeconomic History    Marital status: Single   Tobacco Use    Smoking status: Every Day     Current packs/day: 1.00     Average packs/day: 1 pack/day for 17.5 years (17.5 ttl pk-yrs)     Types: Cigarettes     Start date: 2008     Passive exposure: Never    Smokeless tobacco: Never    Tobacco comments:     He is aware of consequences of smoking.    Vaping Use    Vaping status: Former    Substances: Nicotine    Devices: Pre-filled or refillable cartridge    Passive vaping exposure: Yes   Substance and Sexual Activity    Alcohol use: No    Drug use: Yes     Types: Marijuana     Comment: Does not use anymore    Sexual activity: Yes     Partners: Female     Birth control/protection: None         Office Visit on  05/20/2025   Component Date Value Ref Range Status    SARS Antigen 05/20/2025 Not Detected  Not Detected, Presumptive Negative Final    Influenza A Antigen GARCIA 05/20/2025 Not Detected  Not Detected Final    Influenza B Antigen GARCIA 05/20/2025 Not Detected  Not Detected Final    Internal Control 05/20/2025 Passed  Passed Final    Lot Number 05/20/2025 [id number]   Final    Expiration Date 05/20/2025 03/15/2026   Final    POC Strep A, Molecular 05/20/2025 Negative  Negative Final    Internal Control 05/20/2025 Passed  Passed Final    Lot Number 05/20/2025 [id number]   Final    Expiration Date 05/20/2025 04/24/2026   Final    Uric Acid 05/20/2025 8.1 (H)  2.4 - 5.7 mg/dL Final    ASO 05/20/2025 Negative  Negative Final    Rheumatoid Factor Quantitative 05/20/2025 <10.0  0.0 - 14.0 IU/mL Final    C-Reactive Protein 05/20/2025 0.47  0.00 - 0.50 mg/dL Final    Antinuclear Antibodies (TRAM) 05/20/2025 Negative  Negative Final   Office Visit on 03/25/2025   Component Date Value Ref Range Status    Glucose 03/25/2025 103 (H)  65 - 99 mg/dL Final    BUN 03/25/2025 11  6 - 20 mg/dL Final    Creatinine 03/25/2025 0.99  0.57 - 1.00 mg/dL Final    Sodium 03/25/2025 137  136 - 145 mmol/L Final    Potassium 03/25/2025 3.8  3.5 - 5.2 mmol/L Final    Chloride 03/25/2025 101  98 - 107 mmol/L Final    CO2 03/25/2025 25.4  22.0 - 29.0 mmol/L Final    Calcium 03/25/2025 9.8  8.6 - 10.5 mg/dL Final    Total Protein 03/25/2025 7.2  6.0 - 8.5 g/dL Final    Albumin 03/25/2025 4.0  3.5 - 5.2 g/dL Final    ALT (SGPT) 03/25/2025 51 (H)  1 - 33 U/L Final    AST (SGOT) 03/25/2025 29  1 - 32 U/L Final    Alkaline Phosphatase 03/25/2025 92  39 - 117 U/L Final    Total Bilirubin 03/25/2025 0.2  0.0 - 1.2 mg/dL Final    Globulin 03/25/2025 3.2  gm/dL Final    A/G Ratio 03/25/2025 1.3  g/dL Final    BUN/Creatinine Ratio 03/25/2025 11.1  7.0 - 25.0 Final    Anion Gap 03/25/2025 10.6  5.0 - 15.0 mmol/L Final    eGFR 03/25/2025 77.9  >60.0 mL/min/1.73 Final     Total Cholesterol 03/25/2025 223 (H)  0 - 200 mg/dL Final    Triglycerides 03/25/2025 283 (H)  0 - 150 mg/dL Final    HDL Cholesterol 03/25/2025 33 (L)  40 - 60 mg/dL Final    LDL Cholesterol  03/25/2025 139 (H)  0 - 100 mg/dL Final    VLDL Cholesterol 03/25/2025 51 (H)  5 - 40 mg/dL Final    LDL/HDL Ratio 03/25/2025 4.04   Final    Hemoglobin A1C 03/25/2025 5.60  4.80 - 5.60 % Final    Testosterone, Total 03/25/2025 502.9 (H)  10.0 - 55.0 ng/dL Final    **Verified by repeat analysis**    Testosterone, Free 03/25/2025 14.5 (H)  0.0 - 4.2 pg/mL Final    WBC 03/25/2025 8.40  3.40 - 10.80 10*3/mm3 Final    RBC 03/25/2025 5.31 (H)  3.77 - 5.28 10*6/mm3 Final    Hemoglobin 03/25/2025 16.9 (H)  12.0 - 15.9 g/dL Final    Hematocrit 03/25/2025 49.1 (H)  34.0 - 46.6 % Final    MCV 03/25/2025 92.5  79.0 - 97.0 fL Final    MCH 03/25/2025 31.8  26.6 - 33.0 pg Final    MCHC 03/25/2025 34.4  31.5 - 35.7 g/dL Final    RDW 03/25/2025 13.9  12.3 - 15.4 % Final    RDW-SD 03/25/2025 47.2  37.0 - 54.0 fl Final    MPV 03/25/2025 11.6  6.0 - 12.0 fL Final    Platelets 03/25/2025 226  140 - 450 10*3/mm3 Final    Neutrophil % 03/25/2025 56.5  42.7 - 76.0 % Final    Lymphocyte % 03/25/2025 30.8  19.6 - 45.3 % Final    Monocyte % 03/25/2025 6.0  5.0 - 12.0 % Final    Eosinophil % 03/25/2025 5.6  0.3 - 6.2 % Final    Basophil % 03/25/2025 1.0  0.0 - 1.5 % Final    Immature Grans % 03/25/2025 0.1  0.0 - 0.5 % Final    Neutrophils, Absolute 03/25/2025 4.75  1.70 - 7.00 10*3/mm3 Final    Lymphocytes, Absolute 03/25/2025 2.59  0.70 - 3.10 10*3/mm3 Final    Monocytes, Absolute 03/25/2025 0.50  0.10 - 0.90 10*3/mm3 Final    Eosinophils, Absolute 03/25/2025 0.47 (H)  0.00 - 0.40 10*3/mm3 Final    Basophils, Absolute 03/25/2025 0.08  0.00 - 0.20 10*3/mm3 Final    Immature Grans, Absolute 03/25/2025 0.01  0.00 - 0.05 10*3/mm3 Final    nRBC 03/25/2025 0.0  0.0 - 0.2 /100 WBC Final   Office Visit on 03/06/2025   Component Date Value Ref Range Status     SARS Antigen 03/06/2025 Detected (A)  Not Detected, Presumptive Negative Final    Influenza A Antigen GARCIA 03/06/2025 Not Detected  Not Detected Final    Influenza B Antigen GARCIA 03/06/2025 Not Detected  Not Detected Final    Internal Control 03/06/2025 Passed  Passed Final    Lot Number 03/06/2025 [id number]   Final    Expiration Date 03/06/2025 11/27/2025   Final    Rapid Strep A Screen 03/06/2025 Negative  Negative, VALID, INVALID, Not Performed Final    Internal Control 03/06/2025 Passed  Passed Final    Lot Number 03/06/2025 [id number]   Final    Expiration Date 03/06/2025 01/08/2026   Final         Physical Exam  Vitals and nursing note reviewed.   Constitutional:       Appearance: Normal appearance. He is normal weight.   HENT:      Head: Normocephalic and atraumatic.   Cardiovascular:      Rate and Rhythm: Normal rate and regular rhythm.      Pulses: Normal pulses.      Heart sounds: Normal heart sounds. No murmur heard.     No friction rub. No gallop.   Pulmonary:      Effort: Pulmonary effort is normal. No respiratory distress.      Breath sounds: Normal breath sounds. No stridor. No wheezing, rhonchi or rales.   Chest:      Chest wall: No tenderness.   Abdominal:      General: Abdomen is flat. Bowel sounds are normal. There is no distension.      Palpations: Abdomen is soft. There is no mass.      Tenderness: There is no abdominal tenderness. There is no right CVA tenderness, left CVA tenderness, guarding or rebound.      Hernia: No hernia is present.   Skin:     General: Skin is warm and dry.      Capillary Refill: Capillary refill takes less than 2 seconds.      Coloration: Skin is not jaundiced or pale.   Neurological:      General: No focal deficit present.      Mental Status: He is alert and oriented to person, place, and time. Mental status is at baseline.      Motor: No weakness.      Coordination: Coordination normal.      Gait: Gait normal.   Psychiatric:         Mood and Affect: Mood normal.          Behavior: Behavior normal.         Thought Content: Thought content normal.         Judgment: Judgment normal.          Physical Exam  Cardiovascular: Sinus tachycardia with heart rate of 116 bpm, ST elevations noted.       Result Review  Data Reviewed:{ Labs  Result Review  Imaging  Med Tab  Media :23}   I have reviewed this patient's chart.  I have reviewed previous labs, previous imaging, previous medications, and previous encounters with notes that were available in this patient's chart.    Results  Diagnostic Testing   - EKG: Sinus tachycardia with a heart rate of 116 and some ST elevations.              Assessment and Plan {CC Problem List  Visit Diagnosis  ROS  Review (Popup)  [facility]  BestPractice  Medications  SmartSets  SnapShot Encounters  Media :23}   Diagnoses and all orders for this visit:    1. Hospital discharge follow-up (Primary)  -     ECG 12 Lead    2. History of heart attack  -     Ambulatory Referral to Cardiology    3. Chronic fatigue    4. Chronic joint pain  -     Uric acid  -     Antistreptolysin O screen  -     Rheumatoid Factor  -     C-reactive protein  -     TRAM    5. Fever, unspecified fever cause  -     POCT SARS-CoV-2 + Flu Antigen GARCIA  -     POCT Strep A, molecular    6. Hormone imbalance  -     Testosterone Cypionate (Depo-Testosterone) 200 MG/ML injection; Inject 1 mL into the appropriate muscle as directed by prescriber Every 14 (Fourteen) Days.  Dispense: 2 mL; Refill: 1  -     Testosterone Cypionate (DEPOTESTOTERONE CYPIONATE) injection 200 mg    7. Gender dysphoria in adult  -     Testosterone Cypionate (Depo-Testosterone) 200 MG/ML injection; Inject 1 mL into the appropriate muscle as directed by prescriber Every 14 (Fourteen) Days.  Dispense: 2 mL; Refill: 1  -     Testosterone Cypionate (DEPOTESTOTERONE CYPIONATE) injection 200 mg    8. Abnormal EKG  -     ECG 12 Lead  -     Ambulatory Referral to Cardiology    9. ST elevation  -      Ambulatory Referral to Cardiology        Assessment & Plan  1. Abnormal EKG.  - Heart rate frequently elevated, potentially due to stress or anxiety.  - Presence of ST elevation on EKG suggests potential cardiac issues, either current or historical.  - Repeat EKG will be conducted today for further evaluation.  - Further discussion on next steps if repeat EKG shows abnormalities.    2. Chronic pain.  - Reports chronic pain, particularly in the neck, and concerns about possible lupus.  - Rheumatoid panel will be ordered to investigate any autoimmune markers.  - Referral to rheumatology will be considered if any markers are found.  - Chronic fatigue and intermittent fevers noted.    3. Urinary symptoms.  - Urinary symptoms have improved since starting antibiotics.  - Currently on Cipro antibiotic.  - Return for a nurse visit and quick urine test if symptoms recur after completing the antibiotic course.  - No worsening urinary symptoms or issues reported.    4. Health maintenance.  - COVID-19 test, influenza swab, and strep swab will be conducted today.  - Testosterone injection will be reordered.  - Assistance needed to find a new surgeon due to insurance changes.       -  -ER red flags discussed with patient including risk versus benefit and education provided.  -Follow-up with me in 4 weeks or sooner if needed due to abnormal EKG.    I spent 40 minutes caring for Costa on this date of service. This time includes time spent by me in the following activities:preparing for the visit, reviewing tests, obtaining and/or reviewing a separately obtained history, performing a medically appropriate examination and/or evaluation , counseling and educating the patient/family/caregiver, ordering medications, tests, or procedures, referring and communicating with other health care professionals , documenting information in the medical record, independently interpreting results and communicating that information with the  patient/family/caregiver, and care coordination.    Follow Up {Instructions Charge Capture  Follow-up Communications :23}     Patient was given instructions and counseling regarding his condition or for health maintenance advice. Please see specific information pulled into the AVS (placed there by myself) if appropriate.    Return in about 4 weeks (around 6/17/2025) for Recheck.    Class 3 Severe Obesity (BMI >=40). Obesity-related health conditions include the following: dyslipidemias. Obesity is unchanged. BMI is is above average; BMI management plan is completed. We discussed portion control and increasing exercise.         RAHUL Ayoub, [facility]      Patient or patient representative verbalized consent for the use of Ambient Listening during the visit with  RAHUL Ayoub for chart documentation. 6/17/2025  13:01 EDT

## 2025-05-21 LAB
ANA SER QL: NEGATIVE
ASO AB SERPL-ACNC: NEGATIVE [IU]/ML
CHROMATIN AB SERPL-ACNC: <10 IU/ML (ref 0–14)
CRP SERPL-MCNC: 0.47 MG/DL (ref 0–0.5)
URATE SERPL-MCNC: 8.1 MG/DL (ref 2.4–5.7)

## 2025-06-11 ENCOUNTER — CONSULT (OUTPATIENT)
Dept: CARDIOLOGY | Facility: CLINIC | Age: 35
End: 2025-06-11
Payer: MEDICAID

## 2025-06-11 VITALS
HEIGHT: 68 IN | HEART RATE: 87 BPM | DIASTOLIC BLOOD PRESSURE: 88 MMHG | SYSTOLIC BLOOD PRESSURE: 124 MMHG | OXYGEN SATURATION: 97 % | BODY MASS INDEX: 42.68 KG/M2 | WEIGHT: 281.6 LBS

## 2025-06-11 DIAGNOSIS — R00.2 PALPITATIONS: ICD-10-CM

## 2025-06-11 DIAGNOSIS — E34.9 HORMONE IMBALANCE: ICD-10-CM

## 2025-06-11 DIAGNOSIS — R07.89 CHEST PAIN, ATYPICAL: Primary | ICD-10-CM

## 2025-06-11 DIAGNOSIS — R53.82 CHRONIC FATIGUE: ICD-10-CM

## 2025-06-11 DIAGNOSIS — F64.0 GENDER DYSPHORIA IN ADULT: ICD-10-CM

## 2025-06-11 RX ORDER — MULTIPLE VITAMINS W/ MINERALS TAB 9MG-400MCG
1 TAB ORAL DAILY
COMMUNITY

## 2025-06-11 RX ORDER — ATORVASTATIN CALCIUM 10 MG/1
10 TABLET, FILM COATED ORAL DAILY
Qty: 30 TABLET | Refills: 2 | Status: SHIPPED | OUTPATIENT
Start: 2025-06-11

## 2025-06-11 RX ORDER — CARIPRAZINE 6 MG/1
6 CAPSULE, GELATIN COATED ORAL DAILY
COMMUNITY

## 2025-06-11 NOTE — PROGRESS NOTES
Cardiology Office Visit      Encounter Date:  06/11/2025    Patient ID:   Costa Watson is a 34 y.o. adult.    Reason For Followup:  Chest pain  Palpitations  Fatigue    Brief Clinical History:  Dear Devon Ospina APRN    I had the pleasure of seeing Costa Watson today. As you are well aware, this is a 34 y.o. adult past medical history significant for hypertension history of hyperlipidemia history of metabolic syndrome obesity with a prior history of chest pain was told he had a myocardial infarction likely pericarditis based on patient description here for evaluation treatment options for symptoms and also abnormal EKG    Interval History:  Planing of intermittent chest pain  Shortness of breath  Dyspnea on exertion  Fatigue  Decreased functional capacity secondary to above symptoms  No syncope    Assessment & Plan    Impressions:  Hypertension  Hyperlipidemia  Metabolic syndrome  Obesity/Body mass index is 42.94 kg/m².   Abnormal liver enzymes  Abnormal EKG  Prior diagnosis of possible pericarditis  Cardiac catheterization in 2018 with normal coronaries      Recommendations:  Prior workup labs medications reviewed and discussed with patient  Patient is currently on amlodipine and hydrochlorothiazide for hypertension  Start patient on Lipitor for hyperlipidemia  Need for aggressive risk factor modification exercise and weight loss reviewed and discussed with patient  Continue current medical therapy  Continue close monitoring and follow-up  Echocardiogram to reassess the LV systolic function and also rule out significant pericardial pathology contributing to patient's symptoms with a prior history of pericarditis  Schedule patient for a plain treadmill stress test for further restratification for symptoms of chest pain  Prior workup labs medications reviewed and discussed with patient  Follow-up in office in 3 months        Vitals:  Vitals:    06/11/25 1259   BP: 124/88   BP Location: Left arm  "  Patient Position: Sitting   Cuff Size: Large Adult   Pulse: 87   SpO2: 97%   Weight: 128 kg (281 lb 9.6 oz)   Height: 172.5 cm (67.9\")       Physical Exam:    General: Alert, cooperative, no distress, appears stated age  Head:  Normocephalic, atraumatic, mucous membranes moist  Eyes:  Conjunctiva/corneas clear, EOM's intact     Neck:  Supple,  no adenopathy;      Lungs: Clear to auscultation bilaterally, no wheezes rhonchi rales are noted  Chest wall: No tenderness  Heart::  Regular rate and rhythm, S1 and S2 normal, no murmur, rub or gallop  Abdomen: Soft, non-tender, nondistended bowel sounds active  Extremities: No cyanosis, clubbing, or edema  Pulses: 2+ and symmetric all extremities  Skin:  No rashes or lesions  Neuro/psych: A&O x3. CN II through XII are grossly intact with appropriate affect              Lab Results   Component Value Date    GLUCOSE 103 (H) 03/25/2025    BUN 11 03/25/2025    CREATININE 0.99 03/25/2025    EGFR 77.9 03/25/2025    BCR 11.1 03/25/2025    K 3.8 03/25/2025    CO2 25.4 03/25/2025    CALCIUM 9.8 03/25/2025    ALBUMIN 4.0 03/25/2025    BILITOT 0.2 03/25/2025    AST 29 03/25/2025    ALT 51 (H) 03/25/2025     Results for orders placed in visit on 09/19/18    SCANNED - ECHOCARDIOGRAM     No results found for this or any previous visit.     Lab Results   Component Value Date    CHOL 223 (H) 03/25/2025    CHLPL 251 (H) 01/18/2024    TRIG 283 (H) 03/25/2025    HDL 33 (L) 03/25/2025     (H) 03/25/2025       Results for orders placed in visit on 09/19/18    SCANNED - CARDIOLOGY           Objective:          Allergies:  Allergies   Allergen Reactions    Caplyta [Lumateperone] Swelling    Latex Hives       Medication Review:     Current Outpatient Medications:     amLODIPine (NORVASC) 10 MG tablet, Take 1 tablet by mouth Daily., Disp: 90 tablet, Rfl: 1    Blood Pressure Monitoring (RA Blood Pressure Cuff Monitor) device, Use 1 each 2 (Two) Times a Day., Disp: 1 each, Rfl: 0    " "Cariprazine HCl (Vraylar) 6 MG capsule, Take 1 capsule by mouth Daily., Disp: , Rfl:     fluticasone (FLONASE) 50 MCG/ACT nasal spray, Administer 2 sprays into the nostril(s) as directed by provider Daily., Disp: 18.2 g, Rfl: 3    glucose blood test strip, 1 each by Other route 2 (Two) Times a Day As Needed (blood sugar monitoring)., Disp: 90 each, Rfl: 1    glucose monitor monitoring kit, Use 1 each Daily., Disp: 1 each, Rfl: 1    hydroCHLOROthiazide 25 MG tablet, Take 1 tablet by mouth once daily, Disp: 90 tablet, Rfl: 0    ibuprofen (ADVIL,MOTRIN) 600 MG tablet, Take 1 tablet by mouth Every 8 (Eight) Hours As Needed for Moderate Pain., Disp: 90 tablet, Rfl: 0    loratadine (Claritin) 10 MG tablet, Take 1 tablet by mouth Daily As Needed for Allergies., Disp: 30 tablet, Rfl: 2    mirtazapine (REMERON) 15 MG tablet, Take 1 tablet by mouth Every Night., Disp: , Rfl:     multivitamin with minerals tablet tablet, Take 1 tablet by mouth Daily., Disp: , Rfl:     Needle, Disp, (B-D BLUNT FILL NEEDLE) 18G X 1-1/2\" misc, Use 1 each 1 (One) Time Per Week., Disp: 50 each, Rfl: 1    Needle, Disp, 20G X 1\" misc, Use 1 each 1 (One) Time Per Week., Disp: 50 each, Rfl: 1    ondansetron ODT (ZOFRAN-ODT) 4 MG disintegrating tablet, dissolve 1 tablet in mouth every 8 hours as needed for nausea, Disp: , Rfl:     oxybutynin XL (DITROPAN-XL) 5 MG 24 hr tablet, Take 1 tablet by mouth Daily., Disp: , Rfl:     Sertraline HCl 150 MG capsule, Take 1 capsule by mouth Every Evening., Disp: , Rfl:     Syringe 21G X 1\" 3 ML misc, Use 1 each 1 (One) Time Per Week., Disp: 4 each, Rfl: 2    Syringe, Disposable, (MONOJECT 3CC SYRINGE) 3 ML misc, Use 1 each 1 (One) Time Per Week., Disp: 4 each, Rfl: 2    Testosterone Cypionate (Depo-Testosterone) 200 MG/ML injection, Inject 1 mL into the appropriate muscle as directed by prescriber Every 14 (Fourteen) Days., Disp: 2 mL, Rfl: 1    Ventolin  (90 Base) MCG/ACT inhaler, Inhale 2 puffs Every 4 " (Four) Hours As Needed for Wheezing., Disp: 18 g, Rfl: 3    atorvastatin (LIPITOR) 10 MG tablet, Take 1 tablet by mouth Daily., Disp: 30 tablet, Rfl: 2    Family History:  Family History   Problem Relation Age of Onset    Alzheimer's disease Mother     Stroke Mother     Diabetes Father     Arthritis Father     Atrial fibrillation Father     Breast cancer Paternal Aunt     Cancer Paternal Aunt     Prostate cancer Paternal Uncle     Osteoporosis Maternal Grandmother     Heart attack Maternal Grandmother         unknown    Heart disease Maternal Grandmother         unknown    Heart attack Maternal Grandfather         unknown    Heart disease Maternal Grandfather         unknown    Heart disease Paternal Grandmother     Heart disease Paternal Grandfather     Ovarian cancer Neg Hx     Colon cancer Neg Hx     Uterine cancer Neg Hx        Past Medical History:  Past Medical History:   Diagnosis Date    Acid reflux     ADHD (attention deficit hyperactivity disorder)     Allergic     Pollen/mold/cats    Anxiety     Arthritis     Asthma     C. difficile colitis     Cyst, sinus nasal     Depression     Diverticulosis     Fibromyalgia     Hilar mass     histoplasmosis    Histoplasmosis     Hx of gastroesophageal reflux (GERD)     Hyperlipidemia     Low back pain     NSTEMI (non-ST elevated myocardial infarction) 09/09/2018    related to histoplasmosis    Obesity     Palpitations     PCOS (polycystic ovarian syndrome)     Polycystic ovary syndrome     Schizoaffective disorder     Varicella        Past surgical History:  Past Surgical History:   Procedure Laterality Date    CARDIAC CATHETERIZATION      CHOLECYSTECTOMY      laparoscopic    LAPAROSCOPIC CHOLECYSTECTOMY      MEDIASTINOSCOPY         Social History:  Social History     Socioeconomic History    Marital status: Single   Tobacco Use    Smoking status: Every Day     Current packs/day: 1.00     Average packs/day: 1 pack/day for 17.4 years (17.4 ttl pk-yrs)     Types:  Cigarettes     Start date: 2008     Passive exposure: Never    Smokeless tobacco: Never    Tobacco comments:     He is aware of consequences of smoking.    Vaping Use    Vaping status: Former    Substances: Nicotine    Devices: Pre-filled or refillable cartridge    Passive vaping exposure: Yes   Substance and Sexual Activity    Alcohol use: No    Drug use: Yes     Types: Marijuana     Comment: Does not use anymore    Sexual activity: Yes     Partners: Female     Birth control/protection: None       Review of Systems:  The following systems were reviewed as they relate to the cardiovascular system: Constitutional, Eyes, ENT, Cardiovascular, Respiratory, Gastrointestinal, Integumentary, Neurological, Psychiatric, Hematologic, Endocrine, Musculoskeletal, and Genitourinary. The pertinent cardiovascular findings are reported above with all other cardiovascular points within those systems being negative.    Diagnostic Study Review:     Current Electrocardiogram:  Procedures          NOTE: The following portions of the patient's history were reviewed and updated this visit as appropriate: allergies, current medications, past family history, past medical history, past social history, past surgical history and problem list.

## 2025-06-16 DIAGNOSIS — J30.89 ENVIRONMENTAL AND SEASONAL ALLERGIES: ICD-10-CM

## 2025-06-16 RX ORDER — LORATADINE 10 MG/1
TABLET ORAL
Qty: 90 TABLET | Refills: 0 | Status: SHIPPED | OUTPATIENT
Start: 2025-06-16

## 2025-07-16 ENCOUNTER — OFFICE VISIT (OUTPATIENT)
Dept: FAMILY MEDICINE CLINIC | Facility: CLINIC | Age: 35
End: 2025-07-16
Payer: MEDICAID

## 2025-07-16 VITALS
RESPIRATION RATE: 19 BRPM | BODY MASS INDEX: 42.59 KG/M2 | WEIGHT: 281 LBS | HEIGHT: 68 IN | OXYGEN SATURATION: 98 % | TEMPERATURE: 98.2 F | HEART RATE: 101 BPM

## 2025-07-16 DIAGNOSIS — E34.9 HORMONE IMBALANCE: ICD-10-CM

## 2025-07-16 DIAGNOSIS — R79.89 LOW SERUM TESTOSTERONE: ICD-10-CM

## 2025-07-16 DIAGNOSIS — Z78.9 FEMALE-TO-MALE TRANSGENDER PERSON: ICD-10-CM

## 2025-07-16 DIAGNOSIS — K76.0 FATTY LIVER: ICD-10-CM

## 2025-07-16 DIAGNOSIS — R22.32 AXILLARY MASS, LEFT: ICD-10-CM

## 2025-07-16 DIAGNOSIS — F64.0 GENDER DYSPHORIA IN ADULT: ICD-10-CM

## 2025-07-16 DIAGNOSIS — J02.9 SORE THROAT: Primary | ICD-10-CM

## 2025-07-16 RX ORDER — HYDROXYZINE PAMOATE 100 MG
1 CAPSULE ORAL 3 TIMES DAILY
COMMUNITY
Start: 2025-06-23

## 2025-07-16 RX ORDER — AMOXICILLIN 500 MG/1
CAPSULE ORAL
COMMUNITY
Start: 2025-06-30 | End: 2025-07-23

## 2025-07-16 RX ORDER — TESTOSTERONE ENANTHATE 50 MG/.5ML
50 INJECTION SUBCUTANEOUS
Qty: 1 ML | Refills: 2 | Status: SHIPPED | OUTPATIENT
Start: 2025-07-16

## 2025-07-16 RX ORDER — FLUTICASONE FUROATE, UMECLIDINIUM BROMIDE AND VILANTEROL TRIFENATATE 100; 62.5; 25 UG/1; UG/1; UG/1
1 POWDER RESPIRATORY (INHALATION) DAILY
COMMUNITY
Start: 2025-06-20 | End: 2025-08-15 | Stop reason: SDUPTHER

## 2025-07-17 ENCOUNTER — PRIOR AUTHORIZATION (OUTPATIENT)
Dept: FAMILY MEDICINE CLINIC | Facility: CLINIC | Age: 35
End: 2025-07-17
Payer: MEDICAID

## 2025-07-22 ENCOUNTER — CLINICAL SUPPORT (OUTPATIENT)
Dept: FAMILY MEDICINE CLINIC | Facility: CLINIC | Age: 35
End: 2025-07-22
Payer: MEDICAID

## 2025-07-22 DIAGNOSIS — Z78.9 FEMALE-TO-MALE TRANSGENDER PERSON: ICD-10-CM

## 2025-07-22 DIAGNOSIS — F64.0 GENDER DYSPHORIA IN ADULT: Primary | ICD-10-CM

## 2025-07-22 DIAGNOSIS — R79.89 LOW SERUM TESTOSTERONE: ICD-10-CM

## 2025-07-22 DIAGNOSIS — E34.9 HORMONE IMBALANCE: ICD-10-CM

## 2025-07-22 PROCEDURE — 96372 THER/PROPH/DIAG INJ SC/IM: CPT | Performed by: REGISTERED NURSE

## 2025-07-22 RX ORDER — TESTOSTERONE CYPIONATE 200 MG/ML
200 INJECTION, SOLUTION INTRAMUSCULAR
Status: SHIPPED | OUTPATIENT
Start: 2025-07-22 | End: 2025-10-14

## 2025-07-22 RX ADMIN — TESTOSTERONE CYPIONATE 200 MG: 200 INJECTION, SOLUTION INTRAMUSCULAR at 13:19

## 2025-07-23 ENCOUNTER — TELEMEDICINE (OUTPATIENT)
Dept: FAMILY MEDICINE CLINIC | Facility: TELEHEALTH | Age: 35
End: 2025-07-23
Payer: MEDICAID

## 2025-07-23 DIAGNOSIS — J02.9 ACUTE PHARYNGITIS, UNSPECIFIED ETIOLOGY: Primary | ICD-10-CM

## 2025-07-23 DIAGNOSIS — B37.9 ANTIBIOTIC-INDUCED YEAST INFECTION: ICD-10-CM

## 2025-07-23 DIAGNOSIS — T36.95XA ANTIBIOTIC-INDUCED YEAST INFECTION: ICD-10-CM

## 2025-07-23 RX ORDER — SOLIFENACIN SUCCINATE 5 MG/1
1 TABLET, FILM COATED ORAL DAILY
COMMUNITY
Start: 2025-07-18

## 2025-07-23 RX ORDER — FLUCONAZOLE 150 MG/1
150 TABLET ORAL
Qty: 2 TABLET | Refills: 0 | Status: SHIPPED | OUTPATIENT
Start: 2025-07-23 | End: 2025-07-27

## 2025-07-23 RX ORDER — LAMOTRIGINE 25 MG/1
TABLET ORAL
COMMUNITY
Start: 2025-07-21

## 2025-07-23 RX ORDER — AMOXICILLIN 875 MG/1
875 TABLET, COATED ORAL 2 TIMES DAILY
Qty: 20 TABLET | Refills: 0 | Status: SHIPPED | OUTPATIENT
Start: 2025-07-23 | End: 2025-08-02

## 2025-07-23 NOTE — PATIENT INSTRUCTIONS
Drink plenty of water  Over the counter pain relievers okay  Gargle with warm salty water for sore throat pain (1/2 tsp of salt in a cup of water.   If symptoms do not improve in 3-5 days follow up with your primary care provider or urgent care  If symptoms worsen follow up with urgent care or the emergency room         Pharyngitis    Pharyngitis is a sore throat (pharynx). This is when there is redness, pain, and swelling in your throat. Most of the time, this condition gets better on its own. In some cases, you may need medicine.  What are the causes?  An infection from a virus.  An infection from bacteria.  Allergies.  What increases the risk?  Being 5-24 years old.  Being in crowded environments. These include:  Daycares.  Schools.  Dormitories.  Living in a place with cold temperatures outside.  Having a weakened disease-fighting (immune) system.  What are the signs or symptoms?  Symptoms may vary depending on the cause. Common symptoms include:  Sore throat.  Tiredness (fatigue).  Low-grade fever.  Stuffy nose.  Cough.  Headache.  Other symptoms may include:  Glands in the neck (lymph nodes) that are swollen.  Skin rashes.  Film on the throat or tonsils. This can be caused by an infection from bacteria.  Vomiting.  Red, itchy eyes.  Loss of appetite.  Joint pain and muscle aches.  Tonsils that are temporarily bigger than usual (enlarged).  How is this treated?  Many times, treatment is not needed. This condition usually gets better in 3-4 days without treatment.  If the infection is caused by a bacteria, you may be need to take antibiotics.  Follow these instructions at home:  Medicines  Take over-the-counter and prescription medicines only as told by your doctor.  If you were prescribed an antibiotic medicine, take it as told by your doctor. Do not stop taking the antibiotic even if you start to feel better.  Use throat lozenges or sprays to soothe your throat as told by your doctor.  Children can get  pharyngitis. Do not give your child aspirin.  Managing pain  To help with pain, try:  Sipping warm liquids, such as:  Broth.  Herbal tea.  Warm water.  Eating or drinking cold or frozen liquids, such as frozen ice pops.  Rinsing your mouth (gargle) with a salt water mixture 3-4 times a day or as needed.  To make salt water, dissolve ½-1 tsp (3-6 g) of salt in 1 cup (237 mL) of warm water.  Do not swallow this mixture.  Sucking on hard candy or throat lozenges.  Putting a cool-mist humidifier in your bedroom at night to moisten the air.  Sitting in the bathroom with the door closed for 5-10 minutes while you run hot water in the shower.     General instructions    Do not smoke or use any products that contain nicotine or tobacco. If you need help quitting, ask your doctor.  Rest as told by your doctor.  Drink enough fluid to keep your pee (urine) pale yellow.  How is this prevented?  Wash your hands often for at least 20 seconds with soap and water. If soap and water are not available, use hand .  Do not touch your eyes, nose, or mouth with unwashed hands. Wash hands after touching these areas.  Do not share cups or eating utensils.  Avoid close contact with people who are sick.  Contact a doctor if:  You have large, tender lumps in your neck.  You have a rash.  You cough up green, yellow-brown, or bloody spit.  Get help right away if:  You have a stiff neck.  You drool or cannot swallow liquids.  You cannot drink or take medicines without vomiting.  You have very bad pain that does not go away with medicine.  You have problems breathing, and it is not from a stuffy nose.  You have new pain and swelling in your knees, ankles, wrists, or elbows.  These symptoms may be an emergency. Get help right away. Call your local emergency services (911 in the [place]  Do not wait to see if the symptoms will go away.  Do not drive yourself to the hospital.  Summary  Pharyngitis is a sore throat (pharynx). This is when  there is redness, pain, and swelling in your throat.  Most of the time, pharyngitis gets better on its own. Sometimes, you may need medicine.  If you were prescribed an antibiotic medicine, take it as told by your doctor. Do not stop taking the antibiotic even if you start to feel better.  This information is not intended to replace advice given to you by your health care provider. Make sure you discuss any questions you have with your health care provider.  Document Revised: 03/16/2022 Document Reviewed: 03/16/2022  Elsevier Patient Education © 2024 [employer]

## 2025-07-23 NOTE — PROGRESS NOTES
CHIEF COMPLAINT  Chief Complaint   Patient presents with    Sore Throat         HPI  Costa Watson is a 34 y.o. adult  presents with complaint of sore throat with white spots on the back of his throat.     Review of Systems   Constitutional:  Positive for fatigue and fever (low grade). Negative for chills and diaphoresis.   HENT:  Positive for sore throat. Negative for congestion, postnasal drip, rhinorrhea, sinus pressure, sinus pain and sneezing.    Respiratory:  Negative for cough.    Neurological:  Positive for headaches.   Hematological:  Positive for adenopathy.       Past Medical History:   Diagnosis Date    Acid reflux     ADHD (attention deficit hyperactivity disorder)     Allergic     Pollen/mold/cats    Anxiety     Arthritis     Asthma     C. difficile colitis     Cyst, sinus nasal     Depression     Diverticulosis     Fibromyalgia     Hilar mass     histoplasmosis    Histoplasmosis     Hx of gastroesophageal reflux (GERD)     Hyperlipidemia     Low back pain     NSTEMI (non-ST elevated myocardial infarction) 09/09/2018    related to histoplasmosis    Obesity     Palpitations     PCOS (polycystic ovarian syndrome)     Polycystic ovary syndrome     Schizoaffective disorder     Varicella        Family History   Problem Relation Age of Onset    Alzheimer's disease Mother     Stroke Mother     Diabetes Father     Arthritis Father     Atrial fibrillation Father     Breast cancer Paternal Aunt     Cancer Paternal Aunt     Prostate cancer Paternal Uncle     Osteoporosis Maternal Grandmother     Heart attack Maternal Grandmother         unknown    Heart disease Maternal Grandmother         unknown    Heart attack Maternal Grandfather         unknown    Heart disease Maternal Grandfather         unknown    Heart disease Paternal Grandmother     Heart disease Paternal Grandfather     Ovarian cancer Neg Hx     Colon cancer Neg Hx     Uterine cancer Neg Hx        Social History     Socioeconomic History    Marital  status: Single   Tobacco Use    Smoking status: Every Day     Current packs/day: 1.00     Average packs/day: 1 pack/day for 17.6 years (17.6 ttl pk-yrs)     Types: Cigarettes     Start date: 2008     Passive exposure: Never    Smokeless tobacco: Never    Tobacco comments:     He is aware of consequences of smoking.    Vaping Use    Vaping status: Former    Substances: Nicotine    Devices: Pre-filled or refillable cartridge    Passive vaping exposure: Yes   Substance and Sexual Activity    Alcohol use: No    Drug use: Yes     Types: Marijuana     Comment: Does not use anymore    Sexual activity: Yes     Partners: Female     Birth control/protection: None         Breastfeeding No     PHYSICAL EXAM  Physical Exam   Constitutional: He is oriented to person, place, and time. He appears well-developed and well-nourished. He does not have a sickly appearance. He does not appear ill. No distress.   HENT:   Head: Normocephalic and atraumatic.   Mouth/Throat: Mouth/Lips are normal.Uvula is midline and oropharynx is clear and moist. Mucous membranes are erythematous. Oral lesions (white patch on his tongue for which he states can be partially removed) present. white patches.blistered.  Eyes: EOM are normal.   Neck: Neck normal appearance.  Pulmonary/Chest: Effort normal.  No respiratory distress.  Neurological: He is alert and oriented to person, place, and time.   Skin: Skin is dry.   Psychiatric: He has a normal mood and affect.           Diagnoses and all orders for this visit:    1. Acute pharyngitis, unspecified etiology (Primary)    2. Antibiotic-induced yeast infection    Other orders  -     amoxicillin (AMOXIL) 875 MG tablet; Take 1 tablet by mouth 2 (Two) Times a Day for 10 days.  Dispense: 20 tablet; Refill: 0  -     fluconazole (Diflucan) 150 MG tablet; Take 1 tablet by mouth Every 3 (Three) Days for 2 doses.  Dispense: 2 tablet; Refill: 0        Mode of visit: Video   Myself and Costa Watson participated in this  visit. The patient is located in [address] [facility] [place] [address]. I am located in [place], [place]. Mychart and Twilio were utilized.   You have chosen to receive care through a telehealth visit.     Does the patient consent to use a video/audio connection for your medical care today? Yes       Note Disclaimer: At [facility], we believe that sharing information builds trust and better   relationships. You are receiving this note because you recently visited [facility]. It is possible you   will see health information before a provider has talked with you about it. This kind of information can   be easy to misunderstand. To help you fully understand what it means for your health, we urge you to   discuss this note with your provider.    Leeann Rivera, RAHUL  07/23/2025  18:52 EDT

## 2025-08-07 ENCOUNTER — TELEPHONE (OUTPATIENT)
Dept: FAMILY MEDICINE CLINIC | Facility: CLINIC | Age: 35
End: 2025-08-07
Payer: MEDICAID

## 2025-08-13 ENCOUNTER — OFFICE VISIT (OUTPATIENT)
Dept: FAMILY MEDICINE CLINIC | Facility: CLINIC | Age: 35
End: 2025-08-13
Payer: MEDICAID

## 2025-08-13 VITALS
HEART RATE: 88 BPM | SYSTOLIC BLOOD PRESSURE: 134 MMHG | BODY MASS INDEX: 42.62 KG/M2 | TEMPERATURE: 97.3 F | WEIGHT: 281.2 LBS | OXYGEN SATURATION: 98 % | HEIGHT: 68 IN | DIASTOLIC BLOOD PRESSURE: 88 MMHG | RESPIRATION RATE: 18 BRPM

## 2025-08-13 DIAGNOSIS — R10.9 STOMACH PAIN: ICD-10-CM

## 2025-08-13 DIAGNOSIS — R52 PAIN AGGRAVATED BY LIFTING: ICD-10-CM

## 2025-08-13 DIAGNOSIS — H66.003 NON-RECURRENT ACUTE SUPPURATIVE OTITIS MEDIA OF BOTH EARS WITHOUT SPONTANEOUS RUPTURE OF TYMPANIC MEMBRANES: Primary | ICD-10-CM

## 2025-08-15 DIAGNOSIS — J45.909 UNSPECIFIED ASTHMA, UNCOMPLICATED: ICD-10-CM

## 2025-08-15 RX ORDER — FLUTICASONE FUROATE, UMECLIDINIUM BROMIDE AND VILANTEROL TRIFENATATE 100; 62.5; 25 UG/1; UG/1; UG/1
1 POWDER RESPIRATORY (INHALATION) DAILY
Qty: 60 EACH | Refills: 0 | OUTPATIENT
Start: 2025-08-15

## 2025-08-15 RX ORDER — FLUTICASONE FUROATE, UMECLIDINIUM BROMIDE AND VILANTEROL TRIFENATATE 100; 62.5; 25 UG/1; UG/1; UG/1
1 POWDER RESPIRATORY (INHALATION) DAILY
Qty: 60 EACH | Refills: 1 | Status: SHIPPED | OUTPATIENT
Start: 2025-08-15

## 2025-08-18 ENCOUNTER — TELEPHONE (OUTPATIENT)
Dept: CARDIOLOGY | Facility: CLINIC | Age: 35
End: 2025-08-18
Payer: MEDICAID

## 2025-08-18 RX ORDER — SUVOREXANT 10 MG/1
1 TABLET, FILM COATED ORAL
COMMUNITY
Start: 2025-08-08

## 2025-08-19 ENCOUNTER — OFFICE VISIT (OUTPATIENT)
Dept: FAMILY MEDICINE CLINIC | Facility: CLINIC | Age: 35
End: 2025-08-19
Payer: MEDICAID

## 2025-08-19 VITALS
DIASTOLIC BLOOD PRESSURE: 78 MMHG | HEIGHT: 68 IN | OXYGEN SATURATION: 98 % | TEMPERATURE: 98.2 F | WEIGHT: 282.4 LBS | SYSTOLIC BLOOD PRESSURE: 118 MMHG | RESPIRATION RATE: 18 BRPM | BODY MASS INDEX: 42.8 KG/M2 | HEART RATE: 91 BPM

## 2025-08-19 DIAGNOSIS — H60.503 ACUTE OTITIS EXTERNA OF BOTH EARS, UNSPECIFIED TYPE: ICD-10-CM

## 2025-08-19 DIAGNOSIS — K46.9 ABDOMINAL HERNIA WITHOUT OBSTRUCTION AND WITHOUT GANGRENE, RECURRENCE NOT SPECIFIED, UNSPECIFIED HERNIA TYPE: Primary | ICD-10-CM

## 2025-08-19 DIAGNOSIS — L02.419 ARMPIT ABSCESS: ICD-10-CM

## 2025-08-19 DIAGNOSIS — R21 RASH AND OTHER NONSPECIFIC SKIN ERUPTION: ICD-10-CM

## 2025-08-19 DIAGNOSIS — R10.9 STOMACH PAIN: ICD-10-CM

## 2025-08-19 PROCEDURE — 99214 OFFICE O/P EST MOD 30 MIN: CPT | Performed by: REGISTERED NURSE

## 2025-08-19 PROCEDURE — T1015 CLINIC SERVICE: HCPCS | Performed by: REGISTERED NURSE

## 2025-08-19 PROCEDURE — 1160F RVW MEDS BY RX/DR IN RCRD: CPT | Performed by: REGISTERED NURSE

## 2025-08-19 PROCEDURE — 1125F AMNT PAIN NOTED PAIN PRSNT: CPT | Performed by: REGISTERED NURSE

## 2025-08-19 PROCEDURE — 1159F MED LIST DOCD IN RCRD: CPT | Performed by: REGISTERED NURSE

## 2025-08-19 RX ORDER — CIPROFLOXACIN AND DEXAMETHASONE 3; 1 MG/ML; MG/ML
4 SUSPENSION/ DROPS AURICULAR (OTIC) 2 TIMES DAILY
Qty: 7.5 ML | Refills: 0 | Status: SHIPPED | OUTPATIENT
Start: 2025-08-19

## 2025-08-19 RX ORDER — NYSTATIN AND TRIAMCINOLONE ACETONIDE 100000; 1 [USP'U]/G; MG/G
1 OINTMENT TOPICAL 2 TIMES DAILY
Qty: 60 G | Refills: 1 | Status: SHIPPED | OUTPATIENT
Start: 2025-08-19

## 2025-08-27 DIAGNOSIS — R10.9 STOMACH PAIN: ICD-10-CM

## 2025-08-29 ENCOUNTER — RESULTS FOLLOW-UP (OUTPATIENT)
Dept: FAMILY MEDICINE CLINIC | Facility: CLINIC | Age: 35
End: 2025-08-29
Payer: MEDICAID

## 2025-08-29 DIAGNOSIS — K43.9 SUPRAUMBILICAL HERNIA: ICD-10-CM

## 2025-08-29 DIAGNOSIS — K76.0 FATTY LIVER: ICD-10-CM

## 2025-08-29 DIAGNOSIS — D71 GRANULOMATOUS DISEASE: ICD-10-CM

## 2025-08-29 DIAGNOSIS — K57.90 DIVERTICULOSIS: Primary | ICD-10-CM
